# Patient Record
Sex: MALE | Race: WHITE | Employment: FULL TIME | URBAN - METROPOLITAN AREA
[De-identification: names, ages, dates, MRNs, and addresses within clinical notes are randomized per-mention and may not be internally consistent; named-entity substitution may affect disease eponyms.]

---

## 2018-11-26 PROBLEM — D69.6 THROMBOCYTOPENIA (HCC): Status: ACTIVE | Noted: 2018-11-26

## 2018-11-27 PROBLEM — R73.02 IGT (IMPAIRED GLUCOSE TOLERANCE): Status: ACTIVE | Noted: 2018-11-27

## 2018-11-27 PROBLEM — E66.3 OVERWEIGHT (BMI 25.0-29.9): Status: ACTIVE | Noted: 2018-11-27

## 2018-11-29 PROBLEM — J43.9 PULMONARY EMPHYSEMA (HCC): Status: ACTIVE | Noted: 2018-11-29

## 2018-12-02 PROBLEM — D69.6 THROMBOCYTOPENIA (HCC): Status: RESOLVED | Noted: 2018-11-26 | Resolved: 2018-12-02

## 2018-12-02 PROBLEM — Z23 NEED FOR SHINGLES VACCINE: Status: ACTIVE | Noted: 2018-12-02

## 2018-12-03 PROBLEM — J44.1 CHRONIC OBSTRUCTIVE PULMONARY DISEASE WITH ACUTE EXACERBATION (HCC): Status: ACTIVE | Noted: 2018-12-03

## 2019-04-25 PROBLEM — M72.0 DUPUYTREN'S CONTRACTURE OF RIGHT HAND: Status: ACTIVE | Noted: 2019-04-25

## 2019-04-29 PROBLEM — R43.2: Status: ACTIVE | Noted: 2019-04-29

## 2019-06-04 ENCOUNTER — HOSPITAL ENCOUNTER (OUTPATIENT)
Dept: PHYSICAL THERAPY | Age: 59
End: 2019-06-04

## 2019-06-06 RX ORDER — SODIUM CHLORIDE 0.9 % (FLUSH) 0.9 %
5-40 SYRINGE (ML) INJECTION EVERY 8 HOURS
Status: CANCELLED | OUTPATIENT
Start: 2019-06-06

## 2019-06-06 RX ORDER — SODIUM CHLORIDE 0.9 % (FLUSH) 0.9 %
5-40 SYRINGE (ML) INJECTION AS NEEDED
Status: CANCELLED | OUTPATIENT
Start: 2019-06-06

## 2019-06-12 ENCOUNTER — ANESTHESIA EVENT (OUTPATIENT)
Dept: SURGERY | Age: 59
End: 2019-06-12
Payer: COMMERCIAL

## 2019-06-13 ENCOUNTER — HOSPITAL ENCOUNTER (OUTPATIENT)
Age: 59
Setting detail: OUTPATIENT SURGERY
Discharge: HOME | End: 2019-06-13
Attending: ORTHOPAEDIC SURGERY | Admitting: ORTHOPAEDIC SURGERY
Payer: COMMERCIAL

## 2019-06-13 ENCOUNTER — ANESTHESIA (OUTPATIENT)
Dept: SURGERY | Age: 59
End: 2019-06-13
Payer: COMMERCIAL

## 2019-06-13 VITALS
HEART RATE: 65 BPM | WEIGHT: 191 LBS | OXYGEN SATURATION: 94 % | SYSTOLIC BLOOD PRESSURE: 117 MMHG | BODY MASS INDEX: 28.21 KG/M2 | DIASTOLIC BLOOD PRESSURE: 80 MMHG | TEMPERATURE: 98 F | RESPIRATION RATE: 15 BRPM

## 2019-06-13 DIAGNOSIS — M72.0 DUPUYTREN'S CONTRACTURE OF RIGHT HAND: Primary | ICD-10-CM

## 2019-06-13 PROCEDURE — 74011250636 HC RX REV CODE- 250/636: Performed by: ORTHOPAEDIC SURGERY

## 2019-06-13 PROCEDURE — 74011250637 HC RX REV CODE- 250/637: Performed by: ANESTHESIOLOGY

## 2019-06-13 PROCEDURE — 76010000138 HC OR TIME 0.5 TO 1 HR: Performed by: ORTHOPAEDIC SURGERY

## 2019-06-13 PROCEDURE — 74011250636 HC RX REV CODE- 250/636

## 2019-06-13 PROCEDURE — 76210000063 HC OR PH I REC FIRST 0.5 HR: Performed by: ORTHOPAEDIC SURGERY

## 2019-06-13 PROCEDURE — 74011250636 HC RX REV CODE- 250/636: Performed by: ANESTHESIOLOGY

## 2019-06-13 PROCEDURE — 76210000020 HC REC RM PH II FIRST 0.5 HR: Performed by: ORTHOPAEDIC SURGERY

## 2019-06-13 PROCEDURE — 77030002888 HC SUT CHRMC J&J -A: Performed by: ORTHOPAEDIC SURGERY

## 2019-06-13 PROCEDURE — 77030018836 HC SOL IRR NACL ICUM -A: Performed by: ORTHOPAEDIC SURGERY

## 2019-06-13 PROCEDURE — 76060000032 HC ANESTHESIA 0.5 TO 1 HR: Performed by: ORTHOPAEDIC SURGERY

## 2019-06-13 PROCEDURE — 77030008367 HC SPLNT ORTHGLS BSNM -A: Performed by: ORTHOPAEDIC SURGERY

## 2019-06-13 PROCEDURE — 77030039266 HC ADH SKN EXOFIN S2SG -A: Performed by: ORTHOPAEDIC SURGERY

## 2019-06-13 PROCEDURE — 74011000250 HC RX REV CODE- 250: Performed by: ORTHOPAEDIC SURGERY

## 2019-06-13 PROCEDURE — 74011000250 HC RX REV CODE- 250

## 2019-06-13 RX ORDER — SODIUM CHLORIDE 0.9 % (FLUSH) 0.9 %
5-40 SYRINGE (ML) INJECTION AS NEEDED
Status: DISCONTINUED | OUTPATIENT
Start: 2019-06-13 | End: 2019-06-13 | Stop reason: HOSPADM

## 2019-06-13 RX ORDER — HYDROMORPHONE HYDROCHLORIDE 2 MG/ML
0.5 INJECTION, SOLUTION INTRAMUSCULAR; INTRAVENOUS; SUBCUTANEOUS
Status: DISCONTINUED | OUTPATIENT
Start: 2019-06-13 | End: 2019-06-13 | Stop reason: HOSPADM

## 2019-06-13 RX ORDER — CEFAZOLIN SODIUM/WATER 2 G/20 ML
2 SYRINGE (ML) INTRAVENOUS ONCE
Status: COMPLETED | OUTPATIENT
Start: 2019-06-13 | End: 2019-06-13

## 2019-06-13 RX ORDER — MIDAZOLAM HYDROCHLORIDE 1 MG/ML
2 INJECTION, SOLUTION INTRAMUSCULAR; INTRAVENOUS
Status: DISCONTINUED | OUTPATIENT
Start: 2019-06-13 | End: 2019-06-13 | Stop reason: HOSPADM

## 2019-06-13 RX ORDER — LIDOCAINE HYDROCHLORIDE 5 MG/ML
INJECTION, SOLUTION INFILTRATION; INTRAVENOUS
Status: COMPLETED | OUTPATIENT
Start: 2019-06-13 | End: 2019-06-13

## 2019-06-13 RX ORDER — ONDANSETRON 2 MG/ML
4 INJECTION INTRAMUSCULAR; INTRAVENOUS
Status: DISCONTINUED | OUTPATIENT
Start: 2019-06-13 | End: 2019-06-13 | Stop reason: HOSPADM

## 2019-06-13 RX ORDER — ACETAMINOPHEN 500 MG
1000 TABLET ORAL ONCE
Status: COMPLETED | OUTPATIENT
Start: 2019-06-13 | End: 2019-06-13

## 2019-06-13 RX ORDER — SODIUM CHLORIDE, SODIUM LACTATE, POTASSIUM CHLORIDE, CALCIUM CHLORIDE 600; 310; 30; 20 MG/100ML; MG/100ML; MG/100ML; MG/100ML
1000 INJECTION, SOLUTION INTRAVENOUS CONTINUOUS
Status: DISCONTINUED | OUTPATIENT
Start: 2019-06-13 | End: 2019-06-13 | Stop reason: HOSPADM

## 2019-06-13 RX ORDER — HYDROCODONE BITARTRATE AND ACETAMINOPHEN 5; 325 MG/1; MG/1
1 TABLET ORAL
Qty: 15 TAB | Refills: 0 | Status: SHIPPED | OUTPATIENT
Start: 2019-06-13 | End: 2019-06-16

## 2019-06-13 RX ORDER — LIDOCAINE HYDROCHLORIDE 10 MG/ML
0.1 INJECTION INFILTRATION; PERINEURAL AS NEEDED
Status: DISCONTINUED | OUTPATIENT
Start: 2019-06-13 | End: 2019-06-13 | Stop reason: HOSPADM

## 2019-06-13 RX ORDER — LIDOCAINE HYDROCHLORIDE 10 MG/ML
INJECTION INFILTRATION; PERINEURAL AS NEEDED
Status: DISCONTINUED | OUTPATIENT
Start: 2019-06-13 | End: 2019-06-13 | Stop reason: HOSPADM

## 2019-06-13 RX ORDER — PROPOFOL 10 MG/ML
INJECTION, EMULSION INTRAVENOUS AS NEEDED
Status: DISCONTINUED | OUTPATIENT
Start: 2019-06-13 | End: 2019-06-13 | Stop reason: HOSPADM

## 2019-06-13 RX ORDER — ALBUTEROL SULFATE 0.83 MG/ML
2.5 SOLUTION RESPIRATORY (INHALATION) AS NEEDED
Status: DISCONTINUED | OUTPATIENT
Start: 2019-06-13 | End: 2019-06-13 | Stop reason: HOSPADM

## 2019-06-13 RX ORDER — BUPIVACAINE HYDROCHLORIDE 5 MG/ML
INJECTION, SOLUTION EPIDURAL; INTRACAUDAL AS NEEDED
Status: DISCONTINUED | OUTPATIENT
Start: 2019-06-13 | End: 2019-06-13 | Stop reason: HOSPADM

## 2019-06-13 RX ORDER — SODIUM CHLORIDE 0.9 % (FLUSH) 0.9 %
5-40 SYRINGE (ML) INJECTION EVERY 8 HOURS
Status: DISCONTINUED | OUTPATIENT
Start: 2019-06-13 | End: 2019-06-13 | Stop reason: HOSPADM

## 2019-06-13 RX ORDER — PROPOFOL 10 MG/ML
INJECTION, EMULSION INTRAVENOUS
Status: DISCONTINUED | OUTPATIENT
Start: 2019-06-13 | End: 2019-06-13 | Stop reason: HOSPADM

## 2019-06-13 RX ADMIN — ACETAMINOPHEN 1000 MG: 500 TABLET, FILM COATED ORAL at 09:41

## 2019-06-13 RX ADMIN — LIDOCAINE HYDROCHLORIDE 40 ML: 5 INJECTION, SOLUTION INFILTRATION; INTRAVENOUS at 10:15

## 2019-06-13 RX ADMIN — Medication 2 G: at 10:11

## 2019-06-13 RX ADMIN — PROPOFOL 30 MG: 10 INJECTION, EMULSION INTRAVENOUS at 10:17

## 2019-06-13 RX ADMIN — SODIUM CHLORIDE, SODIUM LACTATE, POTASSIUM CHLORIDE, AND CALCIUM CHLORIDE 1000 ML: 600; 310; 30; 20 INJECTION, SOLUTION INTRAVENOUS at 09:30

## 2019-06-13 RX ADMIN — PROPOFOL 100 MCG/KG/MIN: 10 INJECTION, EMULSION INTRAVENOUS at 10:13

## 2019-06-13 NOTE — ANESTHESIA PROCEDURE NOTES
Peripheral Block    Start time: 6/13/2019 10:15 AM  End time: 6/13/2019 10:16 AM  Performed by: Jaciel Goode CRNA  Authorized by: Chris Akers MD       Pre-procedure:    Indications: at surgeon's request and procedure for pain    Preanesthetic Checklist: patient identified, risks and benefits discussed, site marked, timeout performed, anesthesia consent given and patient being monitored    Timeout Time: 10:14          Block Type:   Block Type:  Carmen block  Patient Position:  Supine  Block Limb IV Checked: Yes    Esmarch exsanguination: Yes    Tourniquet Type:  Single  Tourniquet Location:  Above elbow  Tourniquet Inflated:  6/13/2019 10:15 AM  Inflation (mmHg):  250  Infused Agent:  Lidocaine 0.5%  Volume Infused (mL):  40    Assessment:    Injection Assessment:   Patient tolerance:  Patient tolerated the procedure well with no immediate complications

## 2019-06-13 NOTE — H&P
Outpatient Surgery History and Physical:  Leena Regan was seen and examined. CHIEF COMPLAINT:   Finger contractor of right ring finger     PE:   There were no vitals taken for this visit. Heart:   Regular rhythm      Lungs:  Are clear      Past Medical History:    Patient Active Problem List    Diagnosis    Distorted taste    Dupuytren's contracture of right hand    Need for shingles vaccine    Pulmonary emphysema ([address]    IGT (impaired glucose tolerance)    Overweight (BMI 25.0-29. 9)    History of pneumonia    Tobacco abuse    Hemochromatosis       Surgical History:   Past Surgical History:   Procedure Laterality Date    HX HERNIA REPAIR  1967    inguinal       Social History: Patient  reports that he has been smoking. He has a 60.00 pack-year smoking history. He has never used smokeless tobacco. He reports that he drank about 8.4 oz of alcohol per week. He reports that he does not use drugs. Family History:   Family History   Problem Relation Age of Onset   24 [facility] Cancer Mother     No Known Problems Father        Allergies: Reviewed per EMR  Allergies   Allergen Reactions    Shellfish Derived Other (comments)     Dizzy        Medications:    No current facility-administered medications on file prior to encounter. Current Outpatient Medications on File Prior to Encounter   Medication Sig    albuterol (PROVENTIL VENTOLIN) 2.5 mg /3 mL (0.083 %) nebulizer solution 3 mL by Nebulization route every four (4) hours as needed for Wheezing.  albuterol (PROVENTIL HFA, VENTOLIN HFA, PROAIR HFA) 90 mcg/actuation inhaler Take 2 Puffs by inhalation every four (4) hours as needed for Wheezing or Shortness of Breath. The surgery is planned for the MINI OPEN RELEASE OF THE RIGHT RING FINGER        History and physical has been reviewed. The patient has been examined.  There have been no significant clinical changes since the completion of the originally dated History and Physical.      The patient is here today for outpatient surgery. I have examined the patient, no changes are noted in the patient's medical status. Necessity for the procedure/care is still present and the history and physical above is current. See the office notes for the full long term history of the problem. Please see the recent office notes for the musculoskeletal examination.     Signed By: Sterling Henry MD     June 13, 2019 9:10 AM

## 2019-06-13 NOTE — PERIOP NOTES
PACU DISCHARGE NOTE  Vital signs stable, pain well controlled, alert and oriented times three or at baseline, no anesthetic complications. IV removed with catheter tip intact. Written and verbal discharge instructions given, including pain control, dressing care and follow up appointment. Spouse, Gillian Martin, verbalized understanding and signed discharge instructions electronically. All questions answered prior to discharge. Dr Michele Martínez okay to discharge at this time. Pt and all belongings taken via wheelchair and safely put in vehicle.

## 2019-06-13 NOTE — DISCHARGE INSTRUCTIONS
Keep dressing clean, dry and intact until post op day number 3-4. Then may shower, pat dry and keep covered until follow up. Do not scrub incision or submerge under water. Move fingers, elevate, and ice to prevent swelling. No heavy lifting. TYPICAL SIDE EFFECTS OF PAIN MEDICATION:  *    Constipation: Drink lots of fluids. Over the counter stool softener if needed. *    Nausea: Take pain medication with food. Call your doctor with persistent nausea. ACTIVITY  · As tolerated and as directed by your doctor. · Bathe or shower as directed by your doctor. DIET  · Day of surgery: Clear liquids until no nausea or vomiting; small portion, light diet Robson foods (ex: baked chicken, plain rice, grits, scrambled eggs, toast). Nothing greasy, fried or spicy today. · Advance to regular diet on second day, unless your doctor orders otherwise. · If nausea and vomiting continues, call your doctor. PAIN  · Take pain medication as directed by your doctor. · DO NOT take aspirin or blood thinners unless directed by your doctor. CALL YOUR DOCTOR IF    s Call your doctor if pain is NOT relieved by medication.   s Excessive bleeding that does not stop after holding pressure over the area  · Temperature of 101 degrees F or above  · Excessive redness, swelling or bruising, and/ or green or yellow, smelly discharge from incision    AFTER ANESTHESIA   · For the first 24 hours: DO NOT Drive, Drink alcoholic beverages, or Make important decisions. · Be aware of dizziness following anesthesia and while taking pain medication.        DISCHARGE SUMMARY from Nurse    PATIENT INSTRUCTIONS:    After general anesthesia or intravenous sedation, for 24 hours or while taking prescription Narcotics:  · Limit your activities  · Do not drive and operate hazardous machinery  · Do not make important personal or business decisions  · Do  not drink alcoholic beverages  · If you have not urinated within 8 hours after discharge, please contact your surgeon on call. *  Please give a list of your current medications to your Primary Care Provider. *  Please update this list whenever your medications are discontinued, doses are      changed, or new medications (including over-the-counter products) are added. *  Please carry medication information at all times in case of emergency situations. Preventing Infection at Home  We care about preventing infection and avoiding the spread of germs - not only when you are in the hospital but also when you return home. When you return home from the hospital, its important to take the following steps to help prevent infection and avoid spreading germs that could infect you and others. Ask everyone in your home to follow these guidelines, too. Clean Your Hands  · Clean your hands whenever your hands are visibly dirty, before you eat, before or after touching your mouth, nose or eyes, and before preparing food. Clean them after contact with body fluids, using the restroom, touching animals or changing diapers. · When washing hands, wet them with warm water and work up a lather. Rub hands for at least 15 seconds, then rinse them and pat them dry with a clean towel or paper towel. · When using hand sanitizers, it should take about 15 seconds to rub your hands dry. If not, you probably didnt apply enough . Cover Your Sneeze or Cough  Germs are released into the air whenever you sneeze or cough. To prevent the spread of infection:  · Turn away from other people before coughing or sneezing. · Cover your mouth or nose with a tissue when you cough or sneeze. Put the tissue in the trash. · If you dont have a tissue, cough or sneeze into your upper sleeve, not your hands. · Always clean your hands after coughing or sneezing. Care for Wounds  Your skin is your bodys first line of defense against germs, but an open wound leaves an easy way for germs to enter your body. To prevent infection:  · Clean your hands before and after changing wound dressings, and wear gloves to change dressings if recommended by your doctor. · Take special care with IV lines or other devices inserted into the body. If you must touch them, clean your hands first.  · Follow any specific instructions from your doctor to care for your wounds. Contact your doctor if you experience any signs of infection, such as fever or increased redness at the surgical or wound site. Keep a Clean Home  · Clean or wipe commonly touched hard surfaces like door handles, sinks, tabletops, phones and TV remotes. · Use products labeled disinfectant to kill harmful bacteria and viruses. · Use a clean cloth or paper towel to clean and dry surfaces. Wiping surfaces with a dirty dishcloth, sponge or towel will only spread germs. · Never share toothbrushes, gómez, drinking glasses, utensils, razor blades, face cloths or bath towels to avoid spreading germs. · Be sure that the linens that you sleep on are clean. · Keep pets away from wounds and wash your hands after touching pets, their toys or bedding. We care about you and your health. Remember, preventing infections is a team effort between you, your family, friends and health care providers. These are general instructions for a healthy lifestyle:    No smoking/ No tobacco products/ Avoid exposure to second hand smoke    Surgeon General's Warning:  Quitting smoking now greatly reduces serious risk to your health.     Obesity, smoking, and sedentary lifestyle greatly increases your risk for illness    A healthy diet, regular physical exercise & weight monitoring are important for maintaining a healthy lifestyle    You may be retaining fluid if you have a history of heart failure or if you experience any of the following symptoms:  Weight gain of 3 pounds or more overnight or 5 pounds in a week, increased swelling in our hands or feet or shortness of breath while lying flat in bed. Please call your doctor as soon as you notice any of these symptoms; do not wait until your next office visit. Recognize signs and symptoms of STROKE:    F-face looks uneven    A-arms unable to move or move unevenly    S-speech slurred or non-existent    T-time-call 911 as soon as signs and symptoms begin-DO NOT go       Back to bed or wait to see if you get better-TIME IS BRAIN.

## 2019-06-13 NOTE — ANESTHESIA PREPROCEDURE EVALUATION
Relevant Problems   No relevant active problems       Anesthetic History   No history of anesthetic complications            Review of Systems / Medical History  Patient summary reviewed and pertinent labs reviewed    Pulmonary    COPD: moderate      Shortness of breath (with exertion) and smoker         Neuro/Psych              Cardiovascular                Pertinent negatives: No angina  Exercise tolerance: <4 METS     GI/Hepatic/Renal  Within defined limits              Endo/Other        Obesity     Other Findings   Comments: hemochromatosis           Physical Exam    Airway  Mallampati: II  TM Distance: 4 - 6 cm  Neck ROM: normal range of motion   Mouth opening: Normal     Cardiovascular    Rhythm: regular  Rate: normal      Pertinent negatives: No murmur   Dental    Dentition: Poor dentition     Pulmonary    Rhonchi:bilateral             Abdominal         Other Findings            Anesthetic Plan    ASA: 3  Anesthesia type: total IV anesthesia          Induction: Intravenous  Anesthetic plan and risks discussed with: Patient

## 2019-06-13 NOTE — ANESTHESIA POSTPROCEDURE EVALUATION
Procedure(s): MINI OPEN RELEASE OF THE RIGHT RING FINGER.    total IV anesthesia    Anesthesia Post Evaluation      Multimodal analgesia: multimodal analgesia used between 6 hours prior to anesthesia start to PACU discharge  Patient location during evaluation: bedside  Patient participation: complete - patient participated  Level of consciousness: awake and responsive to light touch  Pain management: adequate  Airway patency: patent  Anesthetic complications: no  Cardiovascular status: acceptable, hemodynamically stable, blood pressure returned to baseline and stable  Respiratory status: acceptable, unassisted, spontaneous ventilation and nonlabored ventilation  Hydration status: acceptable  Post anesthesia nausea and vomiting:  controlled      Vitals Value Taken Time   /65 6/13/2019 11:04 AM   Temp 36.7 °C (98.1 °F) 6/13/2019 10:55 AM   Pulse 63 6/13/2019 11:04 AM   Resp 18 6/13/2019 10:55 AM   SpO2 92 % 6/13/2019 11:04 AM   Vitals shown include unvalidated device data.

## 2019-06-13 NOTE — BRIEF OP NOTE
BRIEF OPERATIVE NOTE    Date of Procedure: 6/13/2019   Preoperative Diagnosis: Dupuytren's contracture [M72.0]  Postoperative Diagnosis: Dupuytren's contracture [M72.0]    Procedure(s):   MINI OPEN RELEASE OF THE RIGHT RING FINGER  Surgeon(s) and Role:     * Marek Hernandez MD - Primary         Surgical Assistant: JOE    Surgical Staff:  Circ-1: Steve Ku RN  Scrub Tech-1: Tung Samuels  Event Time In Time Out   Incision Start 1022    Incision Close 1045      Anesthesia: Carmen   Estimated Blood Loss: MINIMAL  Specimens: * No specimens in log *   Findings: SEE DICTATION   Complications: NONE  Implants: * No implants in log *

## 2019-06-19 NOTE — OP NOTES
Operative Report       DOS:  6/13/19  Preoperative diagnosis:  Dupuytren's contracture [M72.0]    Postoperative diagnosis: Dupuytren's contracture [M72.0]    Surgeon(s) and Role:     * Aditya Jacob MD - Primary     Anesthesia: Carmen Local with MAC. Procedures: Procedure(s): MINI OPEN RELEASE OF THE RIGHT RING FINGER DUPUYTREN'S CONTRACTURE.    EBL/IV FLUIDS: Per Anesthesia. COMPLICATIONS: None. DISPOSITION: Stable to recovery room. INDICATIONS FOR PROCEDURE: The patient is a pleasant 50-year-old male with history of right ring dupuytren's contracture that has failed nonoperative measures. After both operative and nonoperative treatment options were discussed, the decision was made to go ahead with a right ring mini open release. Risks and benefits of the procedure were discussed including, but not limited to bleeding, infection, injury to adjacent structures consisting of tendon, artery, and nerve, need for additional procedures, wound dehiscence, scar formation, incomplete resolution of symptoms, recurrence of symptoms, and transient neuropraxia. Informed consent was obtained. PROCEDURE IN DETAIL: The patient was seen and marked in the preoperative suite. The patient was taken back to the OR, placed on the table in supine position with right upper extremities on hand tables. Right upper extremities was prepped and draped in standard sterile fashion. A formal timeout was performed confirming patient identification, preoperative antibiotics, and planned operative procedure. Patient underwent a Paulina block prior to the procedure. A longitudinal incision was made over the pretendinous cord in the palm affecting the right ring finger. We circumferentially dissected around the cord. Protected the neurovascular bundles under direct vision excise a segment of the cord and were able to get the MP joint into full extension. The patient was not tolerating. Block well.   He had a PIP contracture that we discussed Xiaflex injection so we elected to proceed with closure. We irrigated This with normal saline and closed with absorbable sutures and Dermabond glue. We injected some lidocaine in the procedure. Soft sterile dressing was placed tourniquet was placed down fingers pinked up nicely and the patient was taken to the recovery room having tolerated procedure well. POSTOPERATIVE CARE: Early motion. No heavy lifting. Followup in 2 weeks for suture removal.    Plan well begin him fitted for a splint within the next week to 10 days.     Closure: Primary    Complications: None     Signed By: Britney Lord MD

## 2019-09-23 PROBLEM — Z23 NEED FOR SHINGLES VACCINE: Status: RESOLVED | Noted: 2018-12-02 | Resolved: 2019-09-23

## 2020-02-05 PROBLEM — E66.9 OBESITY (BMI 30.0-34.9): Status: ACTIVE | Noted: 2018-11-27

## 2020-02-11 PROBLEM — Z13.1 SCREENING FOR DIABETES MELLITUS: Status: ACTIVE | Noted: 2020-02-11

## 2020-02-11 PROBLEM — Z12.5 SCREENING PSA (PROSTATE SPECIFIC ANTIGEN): Status: ACTIVE | Noted: 2020-02-11

## 2020-02-11 PROBLEM — J98.01 COUGH DUE TO BRONCHOSPASM: Status: ACTIVE | Noted: 2020-02-11

## 2020-02-11 PROBLEM — J42 CHRONIC BRONCHITIS (HCC): Status: ACTIVE | Noted: 2020-02-11

## 2020-02-11 PROBLEM — E78.5 DYSLIPIDEMIA: Status: ACTIVE | Noted: 2020-02-11

## 2020-02-11 PROBLEM — Z82.49 FAMILY HISTORY OF PREMATURE CAD: Status: ACTIVE | Noted: 2020-02-11

## 2020-03-12 PROBLEM — Z13.1 SCREENING FOR DIABETES MELLITUS: Status: RESOLVED | Noted: 2020-02-11 | Resolved: 2020-03-12

## 2020-12-29 PROBLEM — Z28.21 REFUSED INFLUENZA VACCINE: Status: ACTIVE | Noted: 2020-12-29

## 2020-12-29 PROBLEM — Z53.20 COLONOSCOPY REFUSED: Status: ACTIVE | Noted: 2020-12-29

## 2022-02-13 PROBLEM — Z12.5 SCREENING PSA (PROSTATE SPECIFIC ANTIGEN): Chronic | Status: ACTIVE | Noted: 2020-02-11

## 2022-02-13 PROBLEM — F17.210 SMOKING GREATER THAN 30 PACK YEARS: Status: ACTIVE | Noted: 2022-02-13

## 2022-02-13 PROBLEM — Z00.00 WELL ADULT EXAM: Status: ACTIVE | Noted: 2022-02-13

## 2022-02-13 PROBLEM — Z00.00 WELL ADULT EXAM: Chronic | Status: ACTIVE | Noted: 2022-02-13

## 2022-02-13 PROBLEM — Z91.89 CANDIDATE FOR STATIN THERAPY DUE TO RISK OF FUTURE CARDIOVASCULAR EVENT: Status: ACTIVE | Noted: 2022-02-13

## 2022-02-13 PROBLEM — J98.01 COUGH DUE TO BRONCHOSPASM: Status: RESOLVED | Noted: 2020-02-11 | Resolved: 2022-02-13

## 2022-02-13 PROBLEM — Z91.89 10 YEAR RISK OF MI OR STROKE 7.5% OR GREATER: Status: ACTIVE | Noted: 2022-02-13

## 2022-02-13 PROBLEM — Z13.1 SCREENING FOR DIABETES MELLITUS: Chronic | Status: ACTIVE | Noted: 2020-02-11

## 2022-02-13 PROBLEM — Z92.29 COVID-19 VACCINE SERIES COMPLETED: Status: ACTIVE | Noted: 2022-02-13

## 2022-02-13 PROBLEM — R03.0 ELEVATED BLOOD PRESSURE READING: Status: ACTIVE | Noted: 2022-02-13

## 2022-03-09 ENCOUNTER — HOSPITAL ENCOUNTER (OUTPATIENT)
Dept: CT IMAGING | Age: 62
Discharge: HOME | End: 2022-03-09
Attending: FAMILY MEDICINE
Payer: SELF-PAY

## 2022-03-09 DIAGNOSIS — Z72.0 TOBACCO ABUSE: ICD-10-CM

## 2022-03-09 DIAGNOSIS — Z91.89 CANDIDATE FOR STATIN THERAPY DUE TO RISK OF FUTURE CARDIOVASCULAR EVENT: ICD-10-CM

## 2022-03-09 DIAGNOSIS — E78.5 DYSLIPIDEMIA: ICD-10-CM

## 2022-03-09 DIAGNOSIS — Z91.89 10 YEAR RISK OF MI OR STROKE 7.5% OR GREATER: ICD-10-CM

## 2022-03-09 DIAGNOSIS — R03.0 ELEVATED BLOOD PRESSURE READING: ICD-10-CM

## 2022-03-09 DIAGNOSIS — F17.210 SMOKING GREATER THAN 30 PACK YEARS: ICD-10-CM

## 2022-03-09 DIAGNOSIS — R73.02 IGT (IMPAIRED GLUCOSE TOLERANCE): ICD-10-CM

## 2022-03-09 DIAGNOSIS — E66.3 OVERWEIGHT (BMI 25.0-29.9): ICD-10-CM

## 2022-03-09 DIAGNOSIS — Z82.49 FAMILY HISTORY OF PREMATURE CAD: ICD-10-CM

## 2022-03-09 PROCEDURE — 75571 CT HRT W/O DYE W/CA TEST: CPT

## 2022-03-18 PROBLEM — Z12.5 SCREENING PSA (PROSTATE SPECIFIC ANTIGEN): Status: ACTIVE | Noted: 2020-02-11

## 2022-03-18 PROBLEM — F17.210 SMOKING GREATER THAN 30 PACK YEARS: Status: ACTIVE | Noted: 2022-02-13

## 2022-03-18 PROBLEM — J42 CHRONIC BRONCHITIS (HCC): Status: ACTIVE | Noted: 2020-02-11

## 2022-03-18 PROBLEM — E66.3 OVERWEIGHT (BMI 25.0-29.9): Status: ACTIVE | Noted: 2018-11-27

## 2022-03-18 PROBLEM — Z53.20 COLONOSCOPY REFUSED: Status: ACTIVE | Noted: 2020-12-29

## 2022-03-18 PROBLEM — Z00.00 WELL ADULT EXAM: Status: ACTIVE | Noted: 2022-02-13

## 2022-03-19 PROBLEM — M72.0 DUPUYTREN'S CONTRACTURE OF RIGHT HAND: Status: ACTIVE | Noted: 2019-04-25

## 2022-03-19 PROBLEM — R03.0 ELEVATED BLOOD PRESSURE READING: Status: ACTIVE | Noted: 2022-02-13

## 2022-03-19 PROBLEM — R73.02 IGT (IMPAIRED GLUCOSE TOLERANCE): Status: ACTIVE | Noted: 2018-11-27

## 2022-03-19 PROBLEM — Z92.29 COVID-19 VACCINE SERIES COMPLETED: Status: ACTIVE | Noted: 2022-02-13

## 2022-03-19 PROBLEM — Z91.89 CANDIDATE FOR STATIN THERAPY DUE TO RISK OF FUTURE CARDIOVASCULAR EVENT: Status: ACTIVE | Noted: 2022-02-13

## 2022-03-19 PROBLEM — J43.9 PULMONARY EMPHYSEMA (HCC): Status: ACTIVE | Noted: 2018-11-29

## 2022-03-19 PROBLEM — R43.2: Status: ACTIVE | Noted: 2019-04-29

## 2022-03-19 PROBLEM — Z13.1 SCREENING FOR DIABETES MELLITUS: Status: ACTIVE | Noted: 2020-02-11

## 2022-03-19 PROBLEM — Z91.89 10 YEAR RISK OF MI OR STROKE 7.5% OR GREATER: Status: ACTIVE | Noted: 2022-02-13

## 2022-03-19 PROBLEM — Z28.21 REFUSED INFLUENZA VACCINE: Status: ACTIVE | Noted: 2020-12-29

## 2022-03-20 PROBLEM — E78.5 DYSLIPIDEMIA: Status: ACTIVE | Noted: 2020-02-11

## 2022-03-20 PROBLEM — Z82.49 FAMILY HISTORY OF PREMATURE CAD: Status: ACTIVE | Noted: 2020-02-11

## 2022-04-11 PROBLEM — R94.30 ABNORMAL CARDIOVASCULAR FUNCTION STUDY: Status: ACTIVE | Noted: 2022-04-11

## 2022-06-30 ENCOUNTER — OFFICE VISIT (OUTPATIENT)
Dept: PULMONOLOGY | Age: 62
End: 2022-06-30
Payer: COMMERCIAL

## 2022-06-30 VITALS
SYSTOLIC BLOOD PRESSURE: 120 MMHG | TEMPERATURE: 98 F | HEART RATE: 66 BPM | OXYGEN SATURATION: 96 % | RESPIRATION RATE: 14 BRPM | HEIGHT: 69 IN | WEIGHT: 176 LBS | BODY MASS INDEX: 26.07 KG/M2 | DIASTOLIC BLOOD PRESSURE: 76 MMHG

## 2022-06-30 DIAGNOSIS — J44.9 CHRONIC OBSTRUCTIVE PULMONARY DISEASE, UNSPECIFIED COPD TYPE (HCC): Primary | ICD-10-CM

## 2022-06-30 DIAGNOSIS — F17.210 NICOTINE DEPENDENCE, CIGARETTES, UNCOMPLICATED: ICD-10-CM

## 2022-06-30 LAB
EXPIRATORY TIME: NORMAL
FEF 25-75% %PRED-PRE: NORMAL
FEF 25-75% PRED: NORMAL
FEF 25-75%-PRE: NORMAL
FEV1 %PRED-PRE: 47 %
FEV1 PRED: NORMAL
FEV1/FVC %PRED-PRE: NORMAL
FEV1/FVC PRED: NORMAL
FEV1/FVC: 48 %
FEV1: 1.65 L
FVC %PRED-PRE: 75 %
FVC PRED: NORMAL
FVC: 3.46 L
PEF %PRED-PRE: NORMAL
PEF PRED: NORMAL
PEF-PRE: NORMAL

## 2022-06-30 PROCEDURE — 94010 BREATHING CAPACITY TEST: CPT | Performed by: INTERNAL MEDICINE

## 2022-06-30 PROCEDURE — 99406 BEHAV CHNG SMOKING 3-10 MIN: CPT | Performed by: INTERNAL MEDICINE

## 2022-06-30 PROCEDURE — 99204 OFFICE O/P NEW MOD 45 MIN: CPT | Performed by: INTERNAL MEDICINE

## 2022-06-30 RX ORDER — FLUTICASONE FUROATE, UMECLIDINIUM BROMIDE AND VILANTEROL TRIFENATATE 100; 62.5; 25 UG/1; UG/1; UG/1
1 POWDER RESPIRATORY (INHALATION) DAILY
Qty: 1 EACH | Refills: 11 | Status: SHIPPED | OUTPATIENT
Start: 2022-06-30

## 2022-06-30 RX ORDER — ASPIRIN 81 MG/1
81 TABLET ORAL DAILY
COMMUNITY

## 2022-06-30 RX ORDER — ATORVASTATIN CALCIUM 20 MG/1
20 TABLET, FILM COATED ORAL DAILY
COMMUNITY
End: 2022-07-27 | Stop reason: SDUPTHER

## 2022-06-30 ASSESSMENT — PULMONARY FUNCTION TESTS
FEV1_PERCENT_PREDICTED_PRE: 47
FVC_PERCENT_PREDICTED_PRE: 75
FVC: 3.46
FEV1: 1.65
FEV1/FVC: 48

## 2022-06-30 ASSESSMENT — ENCOUNTER SYMPTOMS: SHORTNESS OF BREATH: 1

## 2022-06-30 NOTE — PROGRESS NOTES
[facility]: Patient Office Visit Note  [address] [place]. [address], [address] [facility]  [phone]    Patient Name:  Amanda Cid  YOB: 1960            Date of Service:  6/30/2022    Chief Complaint   Patient presents with    COPD       History of Present Illness: This is a 80-year-old white male with a history of COPD recently evaluated by Dr. Aura Sandifer with a CT calcium score of 465. Patient had a stress test that was negative but the patient could not reach his target heart rate. He works at [employer] and notes that whenever he tries to  something heavy he gets short of breath. He also notes that he gets short of breath walking up steps and can go up maybe 1-1/2 flights before he would have to stop and rest.  He has been smoking a pack and half cigarettes per day and has approximately 60-pack-year history. He has noted problems with shortness of breath about 6 months. He denies cough or wheezing. He does have a Ventolin inhaler for as needed use which he rarely uses. He has been using Breo with some benefit. Past Medical History:   Diagnosis Date    Allergic rhinitis     Chronic obstructive pulmonary disease (HCC)     inhaler prn, smoker    Cough due to bronchospasm 2/11/2020    chest discomfort (ribs) with cough    Pneumonia     twice. Last episode was 2016       Patient Active Problem List   Diagnosis    Overweight (BMI 25.0-29. 9)    Colonoscopy refused    Well adult exam    Screening PSA (prostate specific antigen)    Chronic bronchitis (HCC)    Smoking greater than 30 pack years    Hemochromatosis, hereditary ([facility] [address]    Distorted taste    Dupuytren's contracture of right hand    10 year risk of MI or stroke 7.5% or greater    Candidate for statin therapy due to risk of future cardiovascular event    Elevated blood pressure reading    COVID-19 vaccine series completed    Refused influenza vaccine    Tobacco abuse  Pulmonary emphysema (HCC)    IGT (impaired glucose tolerance)    Screening for diabetes mellitus    History of pneumonia    Family history of premature CAD    Dyslipidemia    Abnormal cardiovascular function study       Past Surgical History:   Procedure Laterality Date    HERNIA REPAIR  1967    inguinal       Social History     Socioeconomic History    Marital status:      Spouse name: Not on file    Number of children: Not on file    Years of education: Not on file    Highest education level: Not on file   Occupational History    Not on file   Tobacco Use    Smoking status: Current Every Day Smoker     Packs/day: 1.50     Years: 40.00     Pack years: 60.00    Smokeless tobacco: Never Used    Tobacco comment: Quit smoking: trying   Substance and Sexual Activity    Alcohol use: Not Currently    Drug use: No    Sexual activity: Not on file   Other Topics Concern    Not on file   Social History Narrative    Not on file     Social Determinants of Health     Financial Resource Strain:     Difficulty of Paying Living Expenses: Not on file   Food Insecurity:     Worried About [address] in the Last Year: Not on file    Hector of Food in the Last Year: Not on file   Transportation Needs:     Lack of Transportation (Medical): Not on file    Lack of Transportation (Non-Medical):  Not on file   Physical Activity:     Days of Exercise per Week: Not on file    Minutes of Exercise per Session: Not on file   Stress:     Feeling of Stress : Not on file   Social Connections:     Frequency of Communication with Friends and Family: Not on file    Frequency of Social Gatherings with Friends and Family: Not on file    Attends [employer] Services: Not on file    Active Member of Clubs or Organizations: Not on file    Attends Club or Organization Meetings: Not on file    Marital Status: Not on file   Intimate Partner Violence:     Fear of Current or Ex-Partner: Not on file   [facility] Emotionally Abused: Not on file    Physically Abused: Not on file    Sexually Abused: Not on file   Housing Stability:     Unable to Pay for Housing in the Last Year: Not on file    Number of Places Lived in the Last Year: Not on file    Unstable Housing in the Last Year: Not on file       Family History   Problem Relation Age of Onset    No Known Problems Father     Cancer Mother        Allergies   Allergen Reactions    Shellfish-Derived Products Other (See Comments)     Dizzy            Review of Systems   Respiratory: Positive for shortness of breath. Skin: Positive for rash. OBJECTIVE:  Physical Exam:  Vitals:    06/30/22 0904   BP: 120/76   Pulse: 66   Resp: 14   Temp: 98 °F (36.7 °C)   SpO2: 96%        GENERAL APPEARANCE:   The patient is normal weight and in no respiratory distress. HEENT:   PERRL. Conjunctivae unremarkable. Nasal mucosa is without epistaxis, exudate, or polyps. Gums and dentition are unremarkable. There is no oropharyngeal narrowing. TMs are clear. NECK/LYMPHATIC:   Symmetrical with no elevation of jugular venous pulsation. Trachea midline. No thyroid enlargement. No cervical adenopathy. LUNGS:   Normal respiratory effort with symmetrical lung expansion. Breath sounds decreased with no wheezing. HEART:   There is a regular rate and rhythm. No murmur, rub, or gallop. There is no edema in the lower extremities. ABDOMEN:   Soft and non-tender. No hepatosplenomegaly. Bowel sounds are normal.       SKIN:   There are no rashes, cyanosis, jaundice, or ecchymosis present. EXTREMITIES:   The extremities are unremarkable without clubbing, cyanosis, joint inflammation, degenerative, or ischemic change. MUSCULOSKELETAL:   There is no abnormal tone, muscle atrophy, or abnormal movement present. NEURO:   The patient is alert and oriented to person, place, and time.   Memory appears intact and mood is normal.  No gross sensorimotor deficits are present. DIAGNOSTIC TESTS:   CT of chest:         CXR:    Spirometry: 6/30/2022 severe obstruction      Office Spirometry Results Latest Ref Rng & Units 6/30/2022   FVC L 3.46   FEV1 L 1.65   FEV1 %PRED-PRE % 47   FVC %PRED-PRE % 75   FEV1/FVC % 48        Exercise oximetry:      PCXR: No valid procedures specified. CXR PA and lateral:  No results found for this or any previous visit. PET/CT: No valid procedures specified. CT of chest w contrast:  No valid procedures specified. Screening chest CT: No results found for this or any previous visit. CT of chest w/out contrast:   No results found for this or any previous visit. ASSESSMENT:   Diagnosis Orders   1. Chronic obstructive pulmonary disease, unspecified COPD type ([address]  Spirometry Without Bronchodilator   Gold stage 3 Spirometry Without Bronchodilator   2. Nicotine dependence, cigarettes, uncomplicated         PLAN:  · Total smoking cessation is advised. Patient's tobacco use confirmed as documented in the medical record. Discussed various smoking cessation products including pills, patches, inhaler, gum, weaning self off, \"cold turkey\", and smoking cessation classes. Discussed also with patient disease risk of ongoing smoking including CVA, lung cancer, and heart disease. At this point, patient's commitment to quitting is fair. 7 minutes were spent counseling patient regarding tobacco cessation. · Complete PFTs  · Screening for alpha-1 antitrypsin deficiency  · Low-dose screening CT 1 year from March 2022  · Follow-up in 4 months    Orders Placed This Encounter   Procedures    Spirometry Without Bronchodilator     Standing Status:   Future     Number of Occurrences:   1     Standing Expiration Date:   6/30/2023       No orders of the defined types were placed in this encounter.         Electronically signed by  Vivi Andrews MD

## 2022-06-30 NOTE — PROGRESS NOTES
[facility]: Patient Office Visit Note  [address] [place]. [address] [facility], [address] [facility]  [phone]    Patient Name:  David Dean  YOB: 1960            Date of Service:  6/30/2022    No chief complaint on file. History of Present Illness:  ***    Past Medical History:   Diagnosis Date    Allergic rhinitis     Chronic obstructive pulmonary disease (HCC)     inhaler prn, smoker    Cough due to bronchospasm 2/11/2020    chest discomfort (ribs) with cough    Pneumonia     twice. Last episode was 2016       Patient Active Problem List   Diagnosis    Overweight (BMI 25.0-29. 9)    Colonoscopy refused    Well adult exam    Screening PSA (prostate specific antigen)    Chronic bronchitis (HCC)    Smoking greater than 30 pack years    Hemochromatosis, hereditary ([address]    Distorted taste    Dupuytren's contracture of right hand    10 year risk of MI or stroke 7.5% or greater    Candidate for statin therapy due to risk of future cardiovascular event    Elevated blood pressure reading    COVID-19 vaccine series completed    Refused influenza vaccine    Tobacco abuse    Pulmonary emphysema ([address]    IGT (impaired glucose tolerance)    Screening for diabetes mellitus    History of pneumonia    Family history of premature CAD    Dyslipidemia    Abnormal cardiovascular function study       Past Surgical History:   Procedure Laterality Date    HERNIA REPAIR  1967    inguinal       Social History     Socioeconomic History    Marital status:      Spouse name: Not on file    Number of children: Not on file    Years of education: Not on file    Highest education level: Not on file   Occupational History    Not on file   Tobacco Use    Smoking status: Current Every Day Smoker     Packs/day: 1.00    Smokeless tobacco: Never Used    Tobacco comment: Quit smoking: trying   Substance and Sexual Activity    Alcohol use: Not Currently    Drug use: No    Sexual activity: Not on file   Other Topics Concern    Not on file   Social History Narrative    Not on file     Social Determinants of Health     Financial Resource Strain:     Difficulty of Paying Living Expenses: Not on file   Food Insecurity:     Worried About Running Out of Food in the Last Year: Not on file    Hector of Food in the Last Year: Not on file   Transportation Needs:     Lack of Transportation (Medical): Not on file    Lack of Transportation (Non-Medical): Not on file   Physical Activity:     Days of Exercise per Week: Not on file    Minutes of Exercise per Session: Not on file   Stress:     Feeling of Stress : Not on file   Social Connections:     Frequency of Communication with Friends and Family: Not on file    Frequency of Social Gatherings with Friends and Family: Not on file    Attends [employer] Services: Not on file    Active Member of [address] [place] [address] [employer] or Organizations: Not on file    Attends Club or Organization Meetings: Not on file    Marital Status: Not on file   Intimate Partner Violence:     Fear of Current or Ex-Partner: Not on file    Emotionally Abused: Not on file    Physically Abused: Not on file    Sexually Abused: Not on file   Housing Stability:     Unable to Pay for Housing in the Last Year: Not on file    Number of [place] in the Last Year: Not on file    Unstable Housing in the Last Year: Not on file       Family History   Problem Relation Age of Onset    No Known Problems Father     Cancer Mother        Not on File        REVIEW OF SYSTEMS:    CONSTITUTIONAL:   There is no history of fever, chills, night sweats, weight loss, weight gain,   persistent fatigue, or lethargy/hypersomnolence. EYES:   Denies problems with eye pain, erythema, blurred vision, or visual field loss. ENTM:   Denies history of tinnitus, epistaxis, sore throat, hoarseness, or dysphonia. LYMPH:   Denies swollen glands.      CARDIAC:   No chest pain, pressure, discomfort, palpitations, orthopnea, murmurs, or edema. GI:   No dysphagia, heartburn reflux, nausea/vomiting, diarrhea, abdominal pain, or bleeding. :   Denies history of dysuria, hematuria, polyuria, or decreased urine output. MS:   No history of myalgias, arthralgias, bone pain, or muscle cramps. SKIN:   No history of rashes, jaundice, cyanosis, nodules, or ulcers. ENDO:   Negative for heat or cold intolerance. No history of DM. PSYCH:   Negative for anxiety, depression, insomnia, hallucinations. NEURO:   There is no history of AMS, persistent headache, decreased level of consciousness, seizures, or motor or sensory deficits. OBJECTIVE:  Physical Exam:  There were no vitals filed for this visit. GENERAL APPEARANCE:   The patient is normal weight and in no respiratory distress. HEENT:   PERRL. Conjunctivae unremarkable. Nasal mucosa is without epistaxis, exudate, or polyps. Gums and dentition are unremarkable. There is no oropharyngeal narrowing. TMs are clear. NECK/LYMPHATIC:   Symmetrical with no elevation of jugular venous pulsation. Trachea midline. No thyroid enlargement. No cervical adenopathy. LUNGS:   Normal respiratory effort with symmetrical lung expansion. Breath sounds ***. HEART:   There is a regular rate and rhythm. No murmur, rub, or gallop. There is no edema in the lower extremities. ABDOMEN:   Soft and non-tender. No hepatosplenomegaly. Bowel sounds are normal.       SKIN:   There are no rashes, cyanosis, jaundice, or ecchymosis present. EXTREMITIES:   The extremities are unremarkable without clubbing, cyanosis, joint inflammation, degenerative, or ischemic change. MUSCULOSKELETAL:   There is no abnormal tone, muscle atrophy, or abnormal movement present. NEURO:   The patient is alert and oriented to person, place, and time.   Memory appears intact and mood is normal.  No gross sensorimotor deficits are present. DIAGNOSTIC TESTS:   CT of chest:  ***   CXR:  ***   Spirometry:   No flowsheet data found. Exercise oximetry:  ***    PCXR: No valid procedures specified. CXR PA and lateral:  No results found for this or any previous visit. PET/CT: No valid procedures specified. CT of chest w contrast:  No valid procedures specified. Screening chest CT: No results found for this or any previous visit. CT of chest w/out contrast:   No results found for this or any previous visit. ASSESSMENT:  {No diagnosis found. (Refresh or delete this SmartLink)}    PLAN:  · ***    No orders of the defined types were placed in this encounter. No orders of the defined types were placed in this encounter.         Electronically signed by  Jonelle Umanzor MA

## 2022-07-27 ENCOUNTER — OFFICE VISIT (OUTPATIENT)
Dept: FAMILY MEDICINE CLINIC | Facility: CLINIC | Age: 62
End: 2022-07-27
Payer: COMMERCIAL

## 2022-07-27 VITALS
BODY MASS INDEX: 26.36 KG/M2 | SYSTOLIC BLOOD PRESSURE: 116 MMHG | DIASTOLIC BLOOD PRESSURE: 66 MMHG | HEIGHT: 69 IN | WEIGHT: 178 LBS | OXYGEN SATURATION: 95 % | HEART RATE: 81 BPM

## 2022-07-27 DIAGNOSIS — Z12.5 SCREENING PSA (PROSTATE SPECIFIC ANTIGEN): ICD-10-CM

## 2022-07-27 DIAGNOSIS — Z79.899 HIGH RISK MEDICATION USE: ICD-10-CM

## 2022-07-27 DIAGNOSIS — Z00.00 WELL ADULT EXAM: ICD-10-CM

## 2022-07-27 DIAGNOSIS — H61.22 IMPACTED CERUMEN, LEFT EAR: ICD-10-CM

## 2022-07-27 DIAGNOSIS — E66.3 OVERWEIGHT (BMI 25.0-29.9): ICD-10-CM

## 2022-07-27 DIAGNOSIS — R73.02 IGT (IMPAIRED GLUCOSE TOLERANCE): ICD-10-CM

## 2022-07-27 DIAGNOSIS — Z13.1 SCREENING FOR DIABETES MELLITUS: ICD-10-CM

## 2022-07-27 DIAGNOSIS — J43.9 PULMONARY EMPHYSEMA, UNSPECIFIED EMPHYSEMA TYPE (HCC): ICD-10-CM

## 2022-07-27 DIAGNOSIS — I25.10 CORONARY ARTERY DISEASE DUE TO CALCIFIED CORONARY LESION: ICD-10-CM

## 2022-07-27 DIAGNOSIS — E78.5 DYSLIPIDEMIA: Primary | ICD-10-CM

## 2022-07-27 DIAGNOSIS — Z72.0 TOBACCO ABUSE: ICD-10-CM

## 2022-07-27 DIAGNOSIS — I25.84 CORONARY ARTERY DISEASE DUE TO CALCIFIED CORONARY LESION: ICD-10-CM

## 2022-07-27 PROBLEM — R03.0 ELEVATED BLOOD PRESSURE READING: Status: RESOLVED | Noted: 2022-02-13 | Resolved: 2022-07-27

## 2022-07-27 LAB
ALBUMIN SERPL-MCNC: 4.1 G/DL (ref 3.2–4.6)
ALBUMIN/GLOB SERPL: 1.4 {RATIO} (ref 1.2–3.5)
ALP SERPL-CCNC: 86 U/L (ref 50–136)
ALT SERPL-CCNC: 31 U/L (ref 12–65)
AST SERPL-CCNC: 12 U/L (ref 15–37)
BILIRUB DIRECT SERPL-MCNC: 0.2 MG/DL
BILIRUB SERPL-MCNC: 0.8 MG/DL (ref 0.2–1.1)
CHOLEST SERPL-MCNC: 98 MG/DL
GLOBULIN SER CALC-MCNC: 2.9 G/DL (ref 2.3–3.5)
HDLC SERPL-MCNC: 37 MG/DL (ref 40–60)
HDLC SERPL: 2.6 {RATIO}
LDLC SERPL CALC-MCNC: 32.6 MG/DL
PROT SERPL-MCNC: 7 G/DL (ref 6.3–8.2)
TRIGL SERPL-MCNC: 142 MG/DL (ref 35–150)
VLDLC SERPL CALC-MCNC: 28.4 MG/DL (ref 6–23)

## 2022-07-27 PROCEDURE — 99214 OFFICE O/P EST MOD 30 MIN: CPT | Performed by: FAMILY MEDICINE

## 2022-07-27 RX ORDER — ATORVASTATIN CALCIUM 20 MG/1
20 TABLET, FILM COATED ORAL DAILY
Qty: 90 TABLET | Refills: 3 | Status: SHIPPED | OUTPATIENT
Start: 2022-07-27

## 2022-07-27 ASSESSMENT — ENCOUNTER SYMPTOMS
BACK PAIN: 0
ABDOMINAL PAIN: 0
ALLERGIC/IMMUNOLOGIC NEGATIVE: 1
DIARRHEA: 0
EYES NEGATIVE: 1
SHORTNESS OF BREATH: 0
COUGH: 0
WHEEZING: 0
CONSTIPATION: 0
NAUSEA: 0
CHEST TIGHTNESS: 0
VOMITING: 0

## 2022-07-27 NOTE — PROGRESS NOTES
Estephanie Morgan DO                Diplomate of the [employer] of [facility] [place]         [phone]    Perez Garcia is a 65 y.o. male who was seen on 7/27/2022 for   Chief Complaint   Patient presents with    Cholesterol Problem       Assessment & Plan     Diagnosis Orders   1. Dyslipidemia  Hepatic Function Panel    Lipid Panel    atorvastatin (LIPITOR) 20 MG tablet    Lipid Panel    Hepatic Function Panel    Lipid Panel    Comprehensive Metabolic Panel    Recheck lipids today. LDL goal less than 70      2. Coronary artery disease due to calcified coronary lesion  Hepatic Function Panel    Lipid Panel    Lipid Panel    Hepatic Function Panel    Lipid Panel    Asymptomatic      3. IGT (impaired glucose tolerance)  Hemoglobin A1C    Hemoglobin A1C    Comprehensive Metabolic Panel    Hemoglobin A1C    Recheck hemoglobin A1c today. Last time it was 6.2.      4. Overweight (BMI 25.0-29. 9)      Encouraged weight loss to about 165 pounds. 5. Pulmonary emphysema, unspecified emphysema type ([address]      Improved with Trelegy. Now following with Dr. Melvin Alba      6. Tobacco abuse      Encouraged smoking cessation      7. Impacted cerumen, left ear      Removed with ear loop      8. High risk medication use  Hepatic Function Panel    Hepatic Function Panel    Comprehensive Metabolic Panel      9. Screening PSA (prostate specific antigen)  PSA Screening      10. Well adult exam  Lipid Panel    Comprehensive Metabolic Panel    PSA Screening    Hemoglobin A1C      11. Screening for diabetes mellitus  Comprehensive Metabolic Panel          No problem-specific Assessment & Plan notes found for this encounter. Follow-up and Dispositions    Return for PREV SCHED APPT, LABS TODAY. RECENT LABS/TESTS TO REVIEW and DISCUSS    No results found for this visit on 07/27/22.     Subjective    HPI: Please see my assessment and plan notes (above) for individual problems addressed today. Other important information: This is a 50-year-old male patient who is seen today for follow-up on dyslipidemia and coronary artery disease. The patient did not have labs in advance of today's visit. Instead we will draw labs today or at a future date. Since I last saw the patient, he had a coronary calcium score of 465. As a result, Dr. Queen Lozano sent him to see a cardiologist.  He saw Dr. Anny Melendez and underwent some cardiac testing including a nuclear stress test which was normal although the patient was unable to exercise adequately due to shortness of breath from his COPD. As a result, the patient had a consult with Dr. Jen Huang at [facility] in June. Dr. Bryn Torres has been taking Trelegy now instead of Breo and the patient says he is doing significantly better with his shortness of breath. Patient denies any side effects on atorvastatin 20 mg which she has been taking now since March. No follow-up lab testing has yet been done to recheck lipids and liver function. I will do those today. Reviewed and updated this visit by provider:           Review of Systems   Constitutional:  Negative for fatigue and fever. HENT: Negative. Eyes: Negative. Respiratory:  Negative for cough, chest tightness, shortness of breath and wheezing. Cardiovascular:  Negative for chest pain and leg swelling. Gastrointestinal:  Negative for abdominal pain, constipation, diarrhea, nausea and vomiting. Endocrine: Negative. Genitourinary:  Negative for difficulty urinating, dysuria and frequency. Musculoskeletal:  Negative for arthralgias, back pain and neck pain. Skin:  Negative for rash. Allergic/Immunologic: Negative. Neurological:  Negative for dizziness and light-headedness. Psychiatric/Behavioral:  Negative for dysphoric mood. The patient is not nervous/anxious.     All other systems reviewed and are negative. Objective    /66 (Site: Left Upper Arm, Position: Sitting, Cuff Size: Medium Adult)   Pulse 81   Ht 5' 9\" (1.753 m)   Wt 178 lb (80.7 kg)   SpO2 95%   BMI 26.29 kg/m²     Physical Exam  Vitals reviewed. Constitutional:       General: He is not in acute distress. Appearance: Normal appearance. He is well-developed and well-groomed. HENT:      Head: Normocephalic and atraumatic. Right Ear: Tympanic membrane, ear canal and external ear normal. There is no impacted cerumen. Left Ear: Tympanic membrane, ear canal and external ear normal. There is impacted cerumen (Removed with ear loop). Ears:      Comments: External ear canal(s) obstructed by cerumen. Successfully debrided with c ear loop. Tolerated well. No incidental bleeding of canal.     Mouth/Throat:      Mouth: Mucous membranes are moist.   Eyes:      General: Lids are normal.      Extraocular Movements: Extraocular movements intact. Conjunctiva/sclera: Conjunctivae normal.      Pupils: Pupils are equal, round, and reactive to light. Neck:      Vascular: No carotid bruit. Cardiovascular:      Rate and Rhythm: Normal rate and regular rhythm. Heart sounds: Normal heart sounds. Pulmonary:      Effort: Pulmonary effort is normal.      Breath sounds: Normal breath sounds. Abdominal:      General: Bowel sounds are normal.      Palpations: Abdomen is soft. There is no hepatomegaly, splenomegaly or mass. Tenderness: no abdominal tenderness      Hernia: No hernia is present. There is no hernia in the left inguinal area or right inguinal area. Genitourinary:     Penis: Normal.       Testes: Normal.      Epididymis:      Right: Normal.      Left: Normal.   Musculoskeletal:      Cervical back: Neck supple. No rigidity or tenderness. Lymphadenopathy:      Cervical: No cervical adenopathy. Skin:     General: Skin is warm and dry. Neurological:      General: No focal deficit present.       Mental Status: He is alert and oriented to person, place, and time. Cranial Nerves: Cranial nerves are intact. Sensory: Sensation is intact. Motor: Motor function is intact. Deep Tendon Reflexes: Reflexes are normal and symmetric. Psychiatric:         Attention and Perception: Attention and perception normal.         Mood and Affect: Mood and affect normal.         Speech: Speech normal.         Behavior: Behavior normal. Behavior is cooperative. Thought Content: Thought content normal.         Cognition and Memory: Cognition and memory normal.         Judgment: Judgment normal.       On this date 07/27/2022 I have spent 32 minutes reviewing previous notes, lab/imaging results and face to face with the patient discussing the diagnoses and importance of compliance with the treatment plan, as well as documenting on the day of the visit.         Jo Ann Duran DO  [facility]

## 2022-07-28 LAB
EST. AVERAGE GLUCOSE BLD GHB EST-MCNC: 117 MG/DL
HBA1C MFR BLD: 5.7 % (ref 4.8–5.6)

## 2022-11-04 ENCOUNTER — NURSE ONLY (OUTPATIENT)
Dept: PULMONOLOGY | Age: 62
End: 2022-11-04
Payer: COMMERCIAL

## 2022-11-04 DIAGNOSIS — R73.02 IGT (IMPAIRED GLUCOSE TOLERANCE): ICD-10-CM

## 2022-11-04 DIAGNOSIS — J44.9 CHRONIC OBSTRUCTIVE PULMONARY DISEASE, UNSPECIFIED COPD TYPE (HCC): Primary | ICD-10-CM

## 2022-11-04 DIAGNOSIS — Z00.00 WELL ADULT EXAM: ICD-10-CM

## 2022-11-04 LAB
FEV 1 , POC: 1.82 L
FEV1 % PRED, POC: 51 %
FEV1/FVC, POC: NORMAL
FVC % PRED, POC: 82 %
FVC, POC: NORMAL
TOTAL HEMOGLOBIN (SPHB), POC: 16.3 G/DL

## 2022-11-04 PROCEDURE — 88738 HGB QUANT TRANSCUTANEOUS: CPT | Performed by: INTERNAL MEDICINE

## 2022-11-04 PROCEDURE — 94726 PLETHYSMOGRAPHY LUNG VOLUMES: CPT | Performed by: INTERNAL MEDICINE

## 2022-11-04 PROCEDURE — 94729 DIFFUSING CAPACITY: CPT | Performed by: INTERNAL MEDICINE

## 2022-11-04 PROCEDURE — 94060 EVALUATION OF WHEEZING: CPT | Performed by: INTERNAL MEDICINE

## 2022-11-04 ASSESSMENT — PULMONARY FUNCTION TESTS
FVC_PERCENT_PREDICTED_POC: 82
FEV1_PERCENT_PREDICTED_POC: 51

## 2022-11-11 ENCOUNTER — OFFICE VISIT (OUTPATIENT)
Dept: CARDIOLOGY CLINIC | Age: 62
End: 2022-11-11
Payer: COMMERCIAL

## 2022-11-11 VITALS
HEART RATE: 76 BPM | DIASTOLIC BLOOD PRESSURE: 70 MMHG | HEIGHT: 69 IN | BODY MASS INDEX: 26.93 KG/M2 | SYSTOLIC BLOOD PRESSURE: 118 MMHG | WEIGHT: 181.8 LBS

## 2022-11-11 DIAGNOSIS — I25.84 CORONARY ARTERY DISEASE DUE TO CALCIFIED CORONARY LESION: Primary | ICD-10-CM

## 2022-11-11 DIAGNOSIS — Z72.0 TOBACCO ABUSE: ICD-10-CM

## 2022-11-11 DIAGNOSIS — I25.10 CORONARY ARTERY DISEASE DUE TO CALCIFIED CORONARY LESION: Primary | ICD-10-CM

## 2022-11-11 PROCEDURE — 99214 OFFICE O/P EST MOD 30 MIN: CPT | Performed by: INTERNAL MEDICINE

## 2022-11-11 ASSESSMENT — ENCOUNTER SYMPTOMS
EYE PAIN: 0
EYES NEGATIVE: 1
PHOTOPHOBIA: 0
SHORTNESS OF BREATH: 0
ABDOMINAL PAIN: 0
RESPIRATORY NEGATIVE: 1
BACK PAIN: 0
ALLERGIC/IMMUNOLOGIC NEGATIVE: 1
CHEST TIGHTNESS: 0
GASTROINTESTINAL NEGATIVE: 1

## 2022-11-11 NOTE — PROGRESS NOTES
[address] [address], [address]  PHONE: [phone]      22    NAME:  Vannessa Vera  : 1960  MRN: [id number]         SUBJECTIVE:   Vannessa Vera is a 65 y.o. male seen for follow up of:      Chief Complaint   Patient presents with    Coronary Artery Disease    6 Month Follow-Up        Cardiac Hx (Reviewed and summarized by me):  1) CT calcium score - 465 (80%ile for age)  2) FHx - youger brother  at 48 of an AMI  3) COPD  4) Smoker - 1 ppd to 1.5 ppd  5) NM stress - normal perfusion, severe SOB could not get target HR  6) HLD              22 - HDL 26, , Trig 132   22 - HDL 37, LDL 32.6, Trig 142      HPI:  Doing well. Denies chest pain palpitations orthopnea PND lower extremity edema. Continues to smoke a little bit more than a pack a day. His last lipid panel was listed above and shows excellent control. Blood pressure is controlled and he does not take blood pressure medications. Past Medical History, Past Surgical History, Family history, Social History, and Medications were all reviewed with the patient today and updated as necessary.        Current Outpatient Medications:     atorvastatin (LIPITOR) 20 MG tablet, Take 1 tablet by mouth in the morning., Disp: 90 tablet, Rfl: 3    aspirin EC 81 MG EC tablet, Take 81 mg by mouth daily, Disp: , Rfl:     fluticasone-umeclidin-vilant (TRELEGY ELLIPTA) 100-62.5-25 MCG/INH AEPB, Inhale 1 puff into the lungs daily, Disp: 1 each, Rfl: 11    Multiple Vitamin (MULTIVITAMIN PO), Take 1 tablet by mouth daily, Disp: , Rfl:     albuterol sulfate  (90 Base) MCG/ACT inhaler, Inhale 2 puffs into the lungs every 4 hours as needed, Disp: , Rfl:     albuterol (PROVENTIL) (2.5 MG/3ML) 0.083% nebulizer solution, Inhale 2.5 mg into the lungs every 4 hours as needed, Disp: , Rfl:     ibuprofen (ADVIL;MOTRIN) 200 MG tablet, Take 400 mg by mouth as needed, Disp: , Rfl:   Allergies Allergen Reactions    Shellfish-Derived Products Other (See Comments)     Dizzy      Past Medical History:   Diagnosis Date    Allergic rhinitis     Chronic obstructive pulmonary disease (HCC)     inhaler prn, smoker    Cough due to bronchospasm 2/11/2020    chest discomfort (ribs) with cough    Elevated blood pressure reading 2/13/2022    Frequent BP checks and report 2 weeks     Pneumonia     twice. Last episode was 2016     Past Surgical History:   Procedure Laterality Date    HAND SURGERY Right 02/2022    HERNIA REPAIR  1967    inguinal     Family History   Problem Relation Age of Onset    No Known Problems Father     Cancer Mother      Social History     Tobacco Use    Smoking status: Every Day     Packs/day: 1.50     Years: 40.00     Pack years: 60.00     Types: Cigarettes    Smokeless tobacco: Never    Tobacco comments:     Quit smoking: trying   Substance Use Topics    Alcohol use: Not Currently       ROS:  Review of Systems   Constitutional: Negative. Negative for fever. HENT:  Negative for hearing loss, nosebleeds and tinnitus. Eyes: Negative. Negative for photophobia and pain. Respiratory: Negative. Negative for chest tightness and shortness of breath. Cardiovascular: Negative. Negative for chest pain, palpitations and leg swelling. Gastrointestinal: Negative. Negative for abdominal pain. Endocrine: Negative. Negative for cold intolerance and heat intolerance. Genitourinary: Negative. Negative for dysuria. Musculoskeletal: Negative. Negative for back pain and joint swelling. Skin: Negative. Negative for rash. Allergic/Immunologic: Negative. Negative for immunocompromised state. Neurological: Negative. Negative for dizziness, syncope and light-headedness. Hematological: Negative. Does not bruise/bleed easily. Psychiatric/Behavioral: Negative. Negative for suicidal ideas.           PHYSICAL EXAM:  Physical Exam  Constitutional:       General: He is not in acute distress. Appearance: He is not ill-appearing. HENT:      Head: Normocephalic and atraumatic. Nose: No congestion. Mouth/Throat:      Mouth: Mucous membranes are moist.   Eyes:      Extraocular Movements: Extraocular movements intact. Pupils: Pupils are equal, round, and reactive to light. Cardiovascular:      Rate and Rhythm: Normal rate and regular rhythm. Heart sounds: No murmur heard. No friction rub. No gallop. Pulmonary:      Effort: No respiratory distress. Breath sounds: No wheezing or rhonchi. Musculoskeletal:         General: No swelling. Cervical back: Normal range of motion. Right lower leg: No edema. Left lower leg: No edema. Skin:     General: Skin is warm and dry. Findings: No rash. Neurological:      General: No focal deficit present. Mental Status: He is oriented to person, place, and time. Psychiatric:         Mood and Affect: Mood normal.         Behavior: Behavior normal.         Judgment: Judgment normal.        /70   Pulse 76   Ht 5' 9\" (1.753 m)   Wt 181 lb 12.8 oz (82.5 kg)   BMI 26.85 kg/m²      Wt Readings from Last 10 Encounters:   11/11/22 181 lb 12.8 oz (82.5 kg)   07/27/22 178 lb (80.7 kg)   06/30/22 176 lb (79.8 kg)   05/06/22 174 lb (78.9 kg)   04/22/22 174 lb (78.9 kg)   04/11/22 174 lb (78.9 kg)   03/17/22 182 lb (82.6 kg)   01/31/22 191 lb (86.6 kg)   04/27/21 187 lb 9.6 oz (85.1 kg)           Medical problems and test results were reviewed with the patient today. Lab Results   Component Value Date/Time    BUN 12 01/31/2022 10:12 AM     No results found for: GAGE, CREAPOC, CREA  Lab Results   Component Value Date/Time    K 4.2 01/31/2022 10:12 AM       Lab Results   Component Value Date/Time    CHOL 98 07/27/2022 11:22 AM    HDL 37 07/27/2022 11:22 AM    VLDL 24 01/31/2022 10:12 AM       ASSESSMENT and PLAN    ICD-10-CM    1.  Coronary artery disease due to calcified coronary lesion  I25.10 I25.84       2. Tobacco abuse  Z72.0           IMPRESSION:  1) cad -continue aspirin 81 mg daily and Lipitor 20 mg daily   2) hyperlipidemia -he is now at goal of LDL below 70 on 20 mg atorvastatin daily. 3) tobacco abuse have encouraged him to quit        ALL ORDERS THIS ENCOUNTER  No orders of the defined types were placed in this encounter. Follow up in 6 months. Thank you for allowing me to participate in this patient's care. Please call or contact me if there are any questions or concerns regarding the above.       Sakshi Callejas MD  11/11/22  8:17 AM

## 2022-11-14 ENCOUNTER — OFFICE VISIT (OUTPATIENT)
Dept: PULMONOLOGY | Age: 62
End: 2022-11-14
Payer: COMMERCIAL

## 2022-11-14 VITALS
HEART RATE: 83 BPM | HEIGHT: 69 IN | OXYGEN SATURATION: 85 % | BODY MASS INDEX: 27.4 KG/M2 | SYSTOLIC BLOOD PRESSURE: 114 MMHG | TEMPERATURE: 96.6 F | WEIGHT: 185 LBS | DIASTOLIC BLOOD PRESSURE: 78 MMHG | RESPIRATION RATE: 14 BRPM

## 2022-11-14 DIAGNOSIS — J44.9 CHRONIC OBSTRUCTIVE PULMONARY DISEASE, UNSPECIFIED COPD TYPE (HCC): Primary | ICD-10-CM

## 2022-11-14 DIAGNOSIS — F17.210 NICOTINE DEPENDENCE, CIGARETTES, UNCOMPLICATED: ICD-10-CM

## 2022-11-14 DIAGNOSIS — Z87.891 PERSONAL HISTORY OF TOBACCO USE: ICD-10-CM

## 2022-11-14 PROCEDURE — 99214 OFFICE O/P EST MOD 30 MIN: CPT | Performed by: INTERNAL MEDICINE

## 2022-11-14 PROCEDURE — G0296 VISIT TO DETERM LDCT ELIG: HCPCS | Performed by: INTERNAL MEDICINE

## 2022-11-14 NOTE — PROGRESS NOTES
[facility]: FOLLOW-UP Patient Office Visit Note  Ruthie Son , Yohan Coelho. [address], [address] [facility]  [phone]    Patient Name:  Yanely Zimmer  YOB: 1960            Date of Service:  11/14/2022    Chief Complaint   Patient presents with    Follow-up    COPD       History of Present Illness:  Christa Bruner is a 81-year-old white male with history of COPD last seen June 2022. On his evaluation in June PFTs revealed severe airway obstruction. The patient was placed on Trelegy and has noted improvement on that. Since his last visit complete PFTs were done and his FEV1 went from 1.65 L to 1.82 L. Lung volumes were increased and diffusion was severely decreased. Unfortunately patient has not been able to decrease his smoking. Is still a pack and a half a day. He is accumulated about 60 pack years. Past Medical History:   Diagnosis Date    Allergic rhinitis     Chronic obstructive pulmonary disease (HCC)     inhaler prn, smoker    Cough due to bronchospasm 2/11/2020    chest discomfort (ribs) with cough    Elevated blood pressure reading 2/13/2022    Frequent BP checks and report 2 weeks     Pneumonia     twice. Last episode was 2016       Patient Active Problem List   Diagnosis    Overweight (BMI 25.0-29. 9)    Colonoscopy refused    Well adult exam    Screening PSA (prostate specific antigen)    Chronic bronchitis (HCC)    Smoking greater than 30 pack years    Hemochromatosis, hereditary ([address]    Distorted taste    Dupuytren's contracture of right hand    COVID-19 vaccine series completed    Refused influenza vaccine    Tobacco abuse    Pulmonary emphysema ([address]    IGT (impaired glucose tolerance)    Screening for diabetes mellitus    History of pneumonia    Family history of premature CAD    Dyslipidemia    Abnormal cardiovascular function study    Coronary artery disease due to calcified coronary lesion    High risk medication use         Past Surgical History:   Procedure Laterality Date    HAND SURGERY Right 02/2022    HERNIA REPAIR  1967    inguinal       Social History     Socioeconomic History    Marital status:      Spouse name: Not on file    Number of children: Not on file    Years of education: Not on file    Highest education level: Not on file   Occupational History    Not on file   Tobacco Use    Smoking status: Every Day     Packs/day: 1.50     Years: 40.00     Pack years: 60.00     Types: Cigarettes    Smokeless tobacco: Never    Tobacco comments:     Quit smoking: trying   Substance and Sexual Activity    Alcohol use: Not Currently    Drug use: No    Sexual activity: Not on file   Other Topics Concern    Not on file   Social History Narrative    Not on file     Social Determinants of Health     Financial Resource Strain: Not on file   Food Insecurity: Not on file   Transportation Needs: Not on file   Physical Activity: Not on file   Stress: Not on file   Social Connections: Not on file   Intimate Partner Violence: Not on file   Housing Stability: Not on file       Family History   Problem Relation Age of Onset    No Known Problems Father     Cancer Mother        Allergies   Allergen Reactions    Shellfish-Derived Products Other (See Comments)     Dizzy        ? Review of Systems    OBJECTIVE:  Physical Exam:  Vitals:    11/14/22 1508   BP: 114/78   Pulse: 83   Resp: 14   Temp: (!) 96.6 °F (35.9 °C)   SpO2: (!) 85%        GENERAL APPEARANCE:   The patient is normal weight and in no respiratory distress. HEENT:   PERRL. Conjunctivae unremarkable. Nasal mucosa is without epistaxis, exudate, or polyps. Gums and dentition are unremarkable. There is no oropharyngeal narrowing. TMs are clear. NECK/LYMPHATIC:   Symmetrical with no elevation of jugular venous pulsation. Trachea midline. No thyroid enlargement. No cervical adenopathy.      LUNGS:   Normal respiratory effort with symmetrical lung expansion. Breath sounds clear. HEART:   There is a regular rate and rhythm. No murmur, rub, or gallop. There is no edema in the lower extremities. ABDOMEN:   Soft and non-tender. No hepatosplenomegaly. Bowel sounds are normal.       NEURO:   The patient is alert and oriented to person, place, and time. Memory appears intact and mood is normal.  No gross sensorimotor deficits are present. Current Outpatient Medications   Medication Instructions    albuterol (PROVENTIL) 2.5 mg, Inhalation, EVERY 4 HOURS PRN    albuterol sulfate  (90 Base) MCG/ACT inhaler 2 puffs, Inhalation, EVERY 4 HOURS PRN    aspirin EC 81 mg, Oral, DAILY    atorvastatin (LIPITOR) 20 mg, Oral, DAILY    fluticasone-umeclidin-vilant (TRELEGY ELLIPTA) 100-62.5-25 MCG/INH AEPB 1 puff, Inhalation, DAILY    ibuprofen (ADVIL;MOTRIN) 400 mg, Oral, PRN    Multiple Vitamin (MULTIVITAMIN PO) 1 tablet, Oral, DAILY         DIAGNOSTIC TESTS:    CXR:    No results found for this or any previous visit from the past 365 days. CT WITHOUT CONTRAST:    No results found for this or any previous visit from the past 365 days. CT WITH CONTRAST:    No results found for this or any previous visit from the past 365 days. CT HIGH RES:    No results found for this or any previous visit from the past 365 days. CT PE PROTOCOL:    No results found for this or any previous visit from the past 365 days. LDCT SCREENING:    No results found for this or any previous visit from the past 365 days. PET SCAN:    No results found for this or any previous visit from the past 365 days. Spirometry:      Office Spirometry Results Latest Ref Rng & Units 6/30/2022   FVC L 3.46   FEV1 L 1.65   FEV1 %PRED-PRE % 47   FVC %PRED-PRE % 75   FEV1/FVC % 48       Exercise oximetry:          ASSESSMENT:   Diagnosis Orders   1. Chronic obstructive pulmonary disease, unspecified COPD type (HCC)  Stage II gold      2.  Nicotine dependence, cigarettes, uncomplicated            PLAN:   Trelegy 1 puff a day will be continued  Follow-up in 6 months  Repeat chest CT scan in March  Smoking cessation again emphasized  He was supposed to have an alpha-1 antitrypsin test done last visit but we are unable to find results, when he returns we will need to get this test done    Orders Placed This Encounter   Procedures    CT Lung Screen (Annual)     Age: Patient is 65 y.o. Smoking History: Social History    Tobacco Use      Smoking status: Every Day        Packs/day: 1.50        Years: 40.00        Pack years: 61        Types: Cigarettes      Smokeless tobacco: Never      Tobacco comments: Quit smoking: trying    Alcohol use: Not Currently    Drug use: No   Pack years: 61    Date of last lung cancer screening: No previous lung cancer screening exam     Standing Status:   Future     Standing Expiration Date:   5/14/2024     Order Specific Question:   Is there documentation of shared decision making? Answer:   Yes     Order Specific Question:   Is this a low dose CT or a routine CT? Answer:   Low Dose CT [1]     Order Specific Question:   Is this the first (baseline) CT or an annual exam?     Answer: Annual [2]     Order Specific Question:   Does the patient show any signs or symptoms of lung cancer? Answer:   No     Order Specific Question:   Smoking Status? Answer:   Every Day [1]     Order Specific Question:   Smoking packs per day? Answer:   1.5     Order Specific Question:   Years smoking? Answer:   40    DC VISIT TO DISCUSS LUNG CA SCREEN W LDCT         No orders of the defined types were placed in this encounter. Electronically signed by  Anant Hung MD    Discussed with the patient the current USPSTF guidelines released March 9, 2021 for screening for lung cancer.     For adults aged 48 to [de-identified] years who have a 20 pack-year smoking history and currently smoke or have quit within the past 15 years the grade B recommendation is to:  Screen for lung cancer with low-dose computed tomography (LDCT) every year. Stop screening once a person has not smoked for 15 years or has a health problem that limits life expectancy or the ability to have lung surgery. The patient  reports that he has been smoking cigarettes. He has a 60.00 pack-year smoking history. He has never used smokeless tobacco.. Discussed with patient the risks and benefits of screening, including over-diagnosis, false positive rate, and total radiation exposure. The patient currently exhibits no signs or symptoms suggestive of lung cancer. Discussed with patient the importance of compliance with yearly annual lung cancer screenings and willingness to undergo diagnosis and treatment if screening scan is positive. In addition, the patient was counseled regarding the importance of remaining smoke free and/or total smoking cessation.     Also reviewed the following if the patient has Medicare that as of February 10, 2022, Medicare only covers LDCT screening in patients aged 51-72 with at least a 20 pack-year smoking history who currently smoke or have quit in the last 15 years

## 2022-11-14 NOTE — PATIENT INSTRUCTIONS

## 2022-11-28 ENCOUNTER — OFFICE VISIT (OUTPATIENT)
Dept: FAMILY MEDICINE CLINIC | Facility: CLINIC | Age: 62
End: 2022-11-28
Payer: COMMERCIAL

## 2022-11-28 VITALS
HEART RATE: 67 BPM | SYSTOLIC BLOOD PRESSURE: 110 MMHG | BODY MASS INDEX: 26.36 KG/M2 | HEIGHT: 69 IN | OXYGEN SATURATION: 94 % | DIASTOLIC BLOOD PRESSURE: 66 MMHG | WEIGHT: 178 LBS

## 2022-11-28 DIAGNOSIS — Z28.21 REFUSED INFLUENZA VACCINE: ICD-10-CM

## 2022-11-28 DIAGNOSIS — G44.029 CHRONIC CLUSTER HEADACHE, NOT INTRACTABLE: Primary | ICD-10-CM

## 2022-11-28 DIAGNOSIS — Z72.0 TOBACCO ABUSE: Chronic | ICD-10-CM

## 2022-11-28 DIAGNOSIS — Z12.11 SCREEN FOR COLON CANCER: ICD-10-CM

## 2022-11-28 DIAGNOSIS — E66.3 OVERWEIGHT (BMI 25.0-29.9): Chronic | ICD-10-CM

## 2022-11-28 DIAGNOSIS — Z11.4 SCREENING FOR HIV WITHOUT PRESENCE OF RISK FACTORS: ICD-10-CM

## 2022-11-28 DIAGNOSIS — R73.02 IGT (IMPAIRED GLUCOSE TOLERANCE): Chronic | ICD-10-CM

## 2022-11-28 DIAGNOSIS — Z53.20 COLONOSCOPY REFUSED: ICD-10-CM

## 2022-11-28 PROBLEM — Z79.899 HIGH RISK MEDICATION USE: Chronic | Status: ACTIVE | Noted: 2022-07-27

## 2022-11-28 PROBLEM — E78.5 DYSLIPIDEMIA: Chronic | Status: ACTIVE | Noted: 2020-02-11

## 2022-11-28 PROBLEM — Z13.1 SCREENING FOR DIABETES MELLITUS: Chronic | Status: ACTIVE | Noted: 2020-02-11

## 2022-11-28 PROBLEM — I25.10 CORONARY ARTERY DISEASE DUE TO CALCIFIED CORONARY LESION: Chronic | Status: ACTIVE | Noted: 2022-07-27

## 2022-11-28 PROBLEM — J42 CHRONIC BRONCHITIS (HCC): Chronic | Status: ACTIVE | Noted: 2020-02-11

## 2022-11-28 PROBLEM — J43.9 PULMONARY EMPHYSEMA (HCC): Chronic | Status: ACTIVE | Noted: 2018-11-29

## 2022-11-28 PROBLEM — Z12.5 SCREENING PSA (PROSTATE SPECIFIC ANTIGEN): Chronic | Status: ACTIVE | Noted: 2020-02-11

## 2022-11-28 PROBLEM — Z00.00 WELL ADULT EXAM: Chronic | Status: ACTIVE | Noted: 2022-02-13

## 2022-11-28 PROBLEM — I25.84 CORONARY ARTERY DISEASE DUE TO CALCIFIED CORONARY LESION: Chronic | Status: ACTIVE | Noted: 2022-07-27

## 2022-11-28 LAB
BASOPHILS # BLD: 0 K/UL (ref 0–0.2)
BASOPHILS NFR BLD: 0 % (ref 0–2)
DIFFERENTIAL METHOD BLD: ABNORMAL
EOSINOPHIL # BLD: 0.2 K/UL (ref 0–0.8)
EOSINOPHIL NFR BLD: 2 % (ref 0.5–7.8)
ERYTHROCYTE [DISTWIDTH] IN BLOOD BY AUTOMATED COUNT: 12.3 % (ref 11.9–14.6)
HCT VFR BLD AUTO: 53.3 % (ref 41.1–50.3)
HGB BLD-MCNC: 18.2 G/DL (ref 13.6–17.2)
IMM GRANULOCYTES # BLD AUTO: 0.1 K/UL (ref 0–0.5)
IMM GRANULOCYTES NFR BLD AUTO: 1 % (ref 0–5)
LYMPHOCYTES # BLD: 2.1 K/UL (ref 0.5–4.6)
LYMPHOCYTES NFR BLD: 21 % (ref 13–44)
MCH RBC QN AUTO: 33.4 PG (ref 26.1–32.9)
MCHC RBC AUTO-ENTMCNC: 34.1 G/DL (ref 31.4–35)
MCV RBC AUTO: 97.8 FL (ref 82–102)
MONOCYTES # BLD: 0.7 K/UL (ref 0.1–1.3)
MONOCYTES NFR BLD: 7 % (ref 4–12)
NEUTS SEG # BLD: 7 K/UL (ref 1.7–8.2)
NEUTS SEG NFR BLD: 69 % (ref 43–78)
NRBC # BLD: 0 K/UL (ref 0–0.2)
PLATELET # BLD AUTO: 220 K/UL (ref 150–450)
PMV BLD AUTO: 9 FL (ref 9.4–12.3)
RBC # BLD AUTO: 5.45 M/UL (ref 4.23–5.6)
WBC # BLD AUTO: 10.1 K/UL (ref 4.3–11.1)

## 2022-11-28 PROCEDURE — 99214 OFFICE O/P EST MOD 30 MIN: CPT | Performed by: FAMILY MEDICINE

## 2022-11-28 RX ORDER — SUMATRIPTAN 50 MG/1
50 TABLET, FILM COATED ORAL
Qty: 12 TABLET | Refills: 1 | Status: SHIPPED | OUTPATIENT
Start: 2022-11-28 | End: 2022-11-28

## 2022-11-28 RX ORDER — BUTALBITAL, ACETAMINOPHEN AND CAFFEINE 50; 325; 40 MG/1; MG/1; MG/1
1 TABLET ORAL EVERY 4 HOURS PRN
Qty: 30 TABLET | Refills: 1 | Status: SHIPPED | OUTPATIENT
Start: 2022-11-28

## 2022-11-28 RX ORDER — SUMATRIPTAN 25 MG/1
25 TABLET, FILM COATED ORAL
COMMUNITY
End: 2022-11-28 | Stop reason: SDUPTHER

## 2022-11-28 ASSESSMENT — ENCOUNTER SYMPTOMS
VOMITING: 0
ABDOMINAL PAIN: 0
ALLERGIC/IMMUNOLOGIC NEGATIVE: 1
COUGH: 0
CHEST TIGHTNESS: 0
WHEEZING: 0
BACK PAIN: 0
DIARRHEA: 0
SHORTNESS OF BREATH: 0
CONSTIPATION: 0
NAUSEA: 0
EYES NEGATIVE: 1

## 2022-11-28 ASSESSMENT — PATIENT HEALTH QUESTIONNAIRE - PHQ9
SUM OF ALL RESPONSES TO PHQ9 QUESTIONS 1 & 2: 0
SUM OF ALL RESPONSES TO PHQ QUESTIONS 1-9: 0
2. FEELING DOWN, DEPRESSED OR HOPELESS: 0
SUM OF ALL RESPONSES TO PHQ QUESTIONS 1-9: 0
SUM OF ALL RESPONSES TO PHQ QUESTIONS 1-9: 0
1. LITTLE INTEREST OR PLEASURE IN DOING THINGS: 0
SUM OF ALL RESPONSES TO PHQ QUESTIONS 1-9: 0

## 2022-11-28 NOTE — PROGRESS NOTES
Hugo Riddle DO                Diplomate of the [employer] of [facility] [place]         [phone]    Erica Caban is a 65 y.o. male who was seen on 11/28/2022 for   Chief Complaint   Patient presents with    Headache    Other     Sx x 1-2 weeks getting worse, tried ibuprofen    Pain behind left eye, seen at [address] yesterday 11/27/2022         Assessment & Plan     Diagnosis Orders   1. Chronic cluster headache, not intractable  butalbital-acetaminophen-caffeine (FIORICET, ESGIC) -40 MG per tablet    SUMAtriptan (IMITREX) 50 MG tablet    CBC with Auto Differential    CBC with Auto Differential    Trial with Fioricet and higher dose of Imitrex if needed. Report if no improvement within 2 weeks. May use ibuprofen 600 mg 3 times daily      2. IGT (impaired glucose tolerance)      Reviewed recent A1c. Improved. Weight loss has helped. 3. Overweight (BMI 25.0-29. 9)      Improved. Low carb diet advised/reviewed. Information given. 4. Tobacco abuse      Quitting smoking strongly encouraged with his pulmonary disease. This may also contribute to migraine/headaches. 5. Colonoscopy refused  Cologuard (Fecal DNA Colorectal Cancer Screening)    Agrees to Cologuard testing      6. Screen for colon cancer  Cologuard (Fecal DNA Colorectal Cancer Screening)    Ordered Cologuard      7. Screening for HIV without presence of risk factors  HIV 1/2 Ag/Ab, 4TH Generation,W Rflx Confirm      8. Refused influenza vaccine      Strongly encouraged with his pulmonary disease          Follow-up and Dispositions    Return if symptoms worsen or fail to improve, for LABS TODAY, also needs ANA MARIA of CPE scheduled 2/3/23 please! .         RECENT LABS/TESTS TO REVIEW and DISCUSS    No results found for this visit on 11/28/22.      Latest Reference Range & Units 1/29/20 08:50 1/31/22 10:12 7/27/22 11:22   Chol/HDL Ratio - 2. 6   CHOLESTEROL, TOTAL, 708465 <200 MG/ 154 98   HDL Cholesterol 40 - 60 MG/DL 29 (L) 26 (L) 37 (L)   LDL Calculated <100 MG/ (H) 104 (H) 32.6   Triglycerides 35 - 150 MG/ (H) 132 142   VLDL 5 - 40 mg/dL  24    VLDL Cholesterol Calculated 6.0 - 23.0 MG/DL 36  28.4 (H)      Latest Reference Range & Units 5/8/20 10:24 1/31/22 10:12 7/27/22 11:22   Hemoglobin A1C 4.8 - 5.6 % 5.8 (H) 6.2 (H) 5.7 (H)       Subjective    HPI:     This is a 70-year-old male patient here today with complaint of headaches. The headaches have been bothersome for about 2 weeks. Generally, they center behind the left eye and are stabbing in nature. He describes them like an ice cream headache. They can be intermittent or constant. Sometimes they last only a few minutes but sometimes they have lasted up to 4 hours. Ibuprofen seems to help but he has only taken a very small dose (200 mg). He was seen in an urgent care recently and given a very small dose of Imitrex 25 mg which could be repeated once if necessary. He has only taken one of them so far and says it does seem to help but did not last very long. We discussed trying a slightly higher dose at 50 mg and he is advised that he may take a second 50 mg tablet after 2 hours if necessary. I have not totally certain this is a migraine headache. It may be some sort of cluster headache. I have given a prescription for Fioricet to take first and he is instructed to reserve the Imitrex for a more severe headache. Associated nausea or vomiting or visual disturbances with his headaches. His blood pressure has been well controlled. He denies any changes in his diet or medications, etc. he drinks a small to moderate amount of caffeine. 1 cup of coffee and about 20 ounces of Coke per day. He does continue to smoke about a pack and a half of cigarettes per day which I have advised against.    GAVE FIT TEST AT [facility] [place]. Not returned!   I have sent an order for Mark test today. I reviewed the results of his most recent lab testing from when he was here in July. At that time his hemoglobin A1c was down to 5.8 (6.2). He has continued to lose weight which I believe has been helpful for his blood sugars. Reviewed and updated this visit by provider:           Review of Systems   Constitutional:  Negative for fatigue and fever. HENT: Negative. Eyes: Negative. Respiratory:  Negative for cough, chest tightness, shortness of breath and wheezing. Cardiovascular:  Negative for chest pain and leg swelling. Gastrointestinal:  Negative for abdominal pain, constipation, diarrhea, nausea and vomiting. Endocrine: Negative. Genitourinary:  Negative for difficulty urinating, dysuria and frequency. Musculoskeletal:  Negative for arthralgias, back pain and neck pain. Skin:  Negative for rash. Allergic/Immunologic: Negative. Neurological:  Negative for dizziness and light-headedness. Psychiatric/Behavioral:  Negative for dysphoric mood. The patient is not nervous/anxious. All other systems reviewed and are negative. Objective    /66 (Site: Left Upper Arm, Position: Sitting, Cuff Size: Large Adult)   Pulse 67   Ht 5' 9\" (1.753 m)   Wt 178 lb (80.7 kg)   SpO2 94%   BMI 26.29 kg/m²     Physical Exam  Vitals reviewed. Constitutional:       General: He is not in acute distress. Appearance: Normal appearance. HENT:      Head: Normocephalic and atraumatic. Right Ear: Tympanic membrane, ear canal and external ear normal.      Left Ear: Tympanic membrane, ear canal and external ear normal.      Mouth/Throat:      Mouth: Mucous membranes are moist.   Eyes:      Extraocular Movements: Extraocular movements intact. Conjunctiva/sclera: Conjunctivae normal.      Pupils: Pupils are equal, round, and reactive to light. Neck:      Vascular: No carotid bruit. Cardiovascular:      Rate and Rhythm: Normal rate and regular rhythm. Heart sounds: Normal heart sounds. Pulmonary:      Effort: Pulmonary effort is normal.      Breath sounds: Normal breath sounds. Abdominal:      General: Bowel sounds are normal.      Palpations: Abdomen is soft. Musculoskeletal:      Cervical back: Neck supple. No rigidity or tenderness. Lymphadenopathy:      Cervical: No cervical adenopathy. Skin:     General: Skin is warm and dry. Neurological:      General: No focal deficit present. Mental Status: He is alert and oriented to person, place, and time. Psychiatric:         Mood and Affect: Mood normal.         Behavior: Behavior normal.         Thought Content: Thought content normal.         Judgment: Judgment normal.       On this date 11/28/2022 I have spent 30 minutes reviewing previous notes, lab/imaging results and face to face with the patient discussing the diagnoses and importance of compliance with the treatment plan, as well as documenting on the day of the visit.         Melo Andersen DO  [facility] [place]

## 2022-11-28 NOTE — PATIENT INSTRUCTIONS
Flu season is here. We are already having cases of influenza A in [address] [facility]. We expect flu cases to rise this year as people relax about masking and are venturing out more even though they are sick :(    Get your flu shot here or at your local pharmacy. Now is the ideal time!    ^^^^^    Ask about the [facility] vaccine at your pharmacy    Remember to ask your pharmacist about the relatively new [facility] vaccine for shingles prevention. One in three adults gets shingles in their lifetime. It is a very painful, blistering skin condition which is related to having had chickenpox earlier in life. But the shingles CAN be prevented with this highly effective vaccine! Check with your pharmacist to see if it is covered by your insurance (many insurance companies do). We do not carry the vaccine in our office due to its expense and because of recent supply issues. If your insurance does not cover the vaccine, the cost with a discount coupon like Gracie Grmarco antonio is about $150 per shot. TWO shots are required to achieve lifetime immunity and are given at least 2 months apart. Those who have had the live vaccine in the past (Zostavax) may still get re-immunized as the new vaccine is significantly better, about 90% effective. The older vaccine was only 60% effective. If you have had the shingles in the past, you are actually MORE likely to get them again, so it is recommended for you as well. If you think that you cannot get shingles because you have never had the chickenpox, I will bet you that you have and just do not know it. I have never tested ANYONE for exposure to the chickenpox virus that it did not come out positive! Besides which, the vaccine for shingles is the same as the vaccine for chickenpox, so you will end up protected against both! And if you were to get the chickenpox as an adult, it is generally a much more severe illness.   So that's even a better reason to get this vaccine ;)    Please have your pharmacist send us a notification if you get a Shingrix vaccine so we can document it in your medical record. <<< SPECIAL NOTE>>>    There had been a national shortage on this vaccine until recently, so if you have asked about it previously, ask again. Some pharmacies had temporarily stopped administering the vaccine during the COVID-19 crisis. Either way your pharmacy might add your name to a waiting list to contact you when it becomes available and they are administering it once again.

## 2022-12-07 ENCOUNTER — TELEPHONE (OUTPATIENT)
Dept: FAMILY MEDICINE CLINIC | Facility: CLINIC | Age: 62
End: 2022-12-07

## 2022-12-08 DIAGNOSIS — G44.029 CHRONIC CLUSTER HEADACHE, NOT INTRACTABLE: ICD-10-CM

## 2022-12-08 NOTE — TELEPHONE ENCOUNTER
Patient called, spoke with patient, requesting refill for Sumatriptan to [facility], [place]   Per patient has only one tablet remaining

## 2022-12-09 RX ORDER — SUMATRIPTAN 50 MG/1
50 TABLET, FILM COATED ORAL
Qty: 12 TABLET | Refills: 1 | OUTPATIENT
Start: 2022-12-09 | End: 2022-12-09

## 2022-12-19 ENCOUNTER — OFFICE VISIT (OUTPATIENT)
Dept: FAMILY MEDICINE CLINIC | Facility: CLINIC | Age: 62
End: 2022-12-19
Payer: COMMERCIAL

## 2022-12-19 VITALS
DIASTOLIC BLOOD PRESSURE: 64 MMHG | OXYGEN SATURATION: 94 % | BODY MASS INDEX: 26.51 KG/M2 | SYSTOLIC BLOOD PRESSURE: 130 MMHG | HEART RATE: 75 BPM | WEIGHT: 179 LBS | HEIGHT: 69 IN

## 2022-12-19 DIAGNOSIS — G44.029 CHRONIC CLUSTER HEADACHE, NOT INTRACTABLE: Primary | ICD-10-CM

## 2022-12-19 DIAGNOSIS — R71.8 ELEVATED HEMATOCRIT: ICD-10-CM

## 2022-12-19 DIAGNOSIS — Z72.0 TOBACCO ABUSE: Chronic | ICD-10-CM

## 2022-12-19 DIAGNOSIS — Z28.21 REFUSED INFLUENZA VACCINE: ICD-10-CM

## 2022-12-19 DIAGNOSIS — G44.029 CHRONIC CLUSTER HEADACHE, NOT INTRACTABLE: ICD-10-CM

## 2022-12-19 DIAGNOSIS — D58.2 ELEVATED HEMOGLOBIN (HCC): ICD-10-CM

## 2022-12-19 PROCEDURE — 99213 OFFICE O/P EST LOW 20 MIN: CPT | Performed by: FAMILY MEDICINE

## 2022-12-19 RX ORDER — BUTALBITAL, ACETAMINOPHEN AND CAFFEINE 50; 325; 40 MG/1; MG/1; MG/1
1 TABLET ORAL EVERY 4 HOURS PRN
Qty: 30 TABLET | Refills: 1 | Status: SHIPPED | OUTPATIENT
Start: 2022-12-19

## 2022-12-19 RX ORDER — SUMATRIPTAN 100 MG/1
100 TABLET, FILM COATED ORAL
Qty: 9 TABLET | Refills: 2 | Status: SHIPPED | OUTPATIENT
Start: 2022-12-19 | End: 2022-12-19

## 2022-12-19 ASSESSMENT — ENCOUNTER SYMPTOMS
SHORTNESS OF BREATH: 0
ALLERGIC/IMMUNOLOGIC NEGATIVE: 1
VOMITING: 0
EYES NEGATIVE: 1
ABDOMINAL PAIN: 0
BACK PAIN: 0
CONSTIPATION: 0
CHEST TIGHTNESS: 0
WHEEZING: 0
DIARRHEA: 0
NAUSEA: 0
COUGH: 0

## 2022-12-19 NOTE — PATIENT INSTRUCTIONS
Flu season is here. We are having a heavy amount of influenza A in [place] resulting in a \"trifecta\" of influenza, RSV and COVID 19. Please be prepared by doing the single most important thing to prevent the flu. .. getting a flu shot! Get your flu shot here or at your local pharmacy. Don't wait any longer.

## 2022-12-19 NOTE — PROGRESS NOTES
Rama Alonso DO                Diplomate of the [employer] of [facility] [place]         [phone]    Shanelle Salamanca is a 65 y.o. male who was seen on 12/19/2022 for   Chief Complaint   Patient presents with    Headache         Assessment & Plan     Diagnosis Orders   1. Chronic cluster headache, not intractable  MRI BRAIN WO CONTRAST    SUMAtriptan (IMITREX) 100 MG tablet    butalbital-acetaminophen-caffeine (FIORICET, ESGIC) -40 MG per tablet      2. Refused influenza vaccine        3. Chronic cluster headache, not intractable  MRI BRAIN WO CONTRAST    SUMAtriptan (IMITREX) 100 MG tablet    butalbital-acetaminophen-caffeine (FIORICET, ESGIC) -40 MG per tablet    Trial with Fioricet and higher dose of Imitrex if needed. Report if no improvement within 2 weeks. May use ibuprofen 600 mg 3 times daily      4. Elevated hemoglobin (HCC)        5. Elevated hematocrit        6. Tobacco abuse                RECENT LABS/TESTS TO REVIEW and DISCUSS    No results found for this visit on 12/19/22. Subjective    HPI:     This is a 58-year-old male patient here today for follow-up on chronic daily headaches. After his last routine visit, he began taking Imitrex which I had prescribed as needed for migrainous headache. He was told that he could second pill if necessary for migraine. Prescribed 12 pills with 1 refill and the patient went through 24 pills in just about 2 weeks time! When he called for refills I had my nurse advised him that he was not to be taking this medication on a regular basis. It was meant only for severe migraine headaches. We will have him increase his dose of Imitrex to 100 mg (he may occasionally take a second dose after 2 hours if his headache persists). Refilled butalbital (Fioricet) as this has been helpful.   He should take this medication preferentially and save the Imitrex for severe migraine. NOTE:  Obtained CBC this day indicating high H/H. Advised donating blood (has not been doing regularly). This COULD explain headaches. ALSO QUIT SMOKING! Because of the length of time and the persistent nature of this headache complex we will get MRI of the brain. Reviewed and updated this visit by provider:           Review of Systems   Constitutional:  Negative for fatigue and fever. HENT: Negative. Eyes: Negative. Respiratory:  Negative for cough, chest tightness, shortness of breath and wheezing. Cardiovascular:  Negative for chest pain and leg swelling. Gastrointestinal:  Negative for abdominal pain, constipation, diarrhea, nausea and vomiting. Endocrine: Negative. Genitourinary:  Negative for difficulty urinating, dysuria and frequency. Musculoskeletal:  Negative for arthralgias, back pain and neck pain. Skin:  Negative for rash. Allergic/Immunologic: Negative. Neurological:  Positive for headaches (left post occular). Negative for dizziness and light-headedness. Psychiatric/Behavioral:  Negative for dysphoric mood. The patient is not nervous/anxious. All other systems reviewed and are negative. Objective    /64 (Site: Left Upper Arm, Position: Sitting, Cuff Size: Large Adult)   Pulse 75   Ht 5' 9\" (1.753 m)   Wt 179 lb (81.2 kg)   SpO2 94%   BMI 26.43 kg/m²     Physical Exam  Vitals reviewed. Constitutional:       General: He is not in acute distress. Appearance: Normal appearance. HENT:      Head: Normocephalic and atraumatic. Right Ear: Tympanic membrane, ear canal and external ear normal.      Left Ear: Tympanic membrane, ear canal and external ear normal.      Mouth/Throat:      Mouth: Mucous membranes are moist.   Eyes:      Extraocular Movements: Extraocular movements intact. Conjunctiva/sclera: Conjunctivae normal.      Pupils: Pupils are equal, round, and reactive to light.    Neck:      Vascular: No carotid bruit. Cardiovascular:      Rate and Rhythm: Normal rate and regular rhythm. Heart sounds: Normal heart sounds. Pulmonary:      Effort: Pulmonary effort is normal.      Breath sounds: Normal breath sounds. Abdominal:      General: Bowel sounds are normal.      Palpations: Abdomen is soft. Musculoskeletal:      Cervical back: Neck supple. No rigidity or tenderness. Lymphadenopathy:      Cervical: No cervical adenopathy. Skin:     General: Skin is warm and dry. Neurological:      General: No focal deficit present. Mental Status: He is alert and oriented to person, place, and time. Psychiatric:         Mood and Affect: Mood normal.         Behavior: Behavior normal.         Thought Content: Thought content normal.         Judgment: Judgment normal.       On this date 12/19/2022 I have spent 31 minutes reviewing previous notes, lab/imaging results and face to face with the patient discussing the diagnoses and importance of compliance with the treatment plan, as well as documenting on the day of the visit.         Betty Leal DO  [facility] [place]

## 2022-12-28 PROBLEM — Z00.00 WELL ADULT EXAM: Chronic | Status: RESOLVED | Noted: 2022-02-13 | Resolved: 2022-12-28

## 2022-12-28 PROBLEM — Z13.1 SCREENING FOR DIABETES MELLITUS: Chronic | Status: RESOLVED | Noted: 2020-02-11 | Resolved: 2022-12-28

## 2022-12-28 PROBLEM — Z12.11 SCREEN FOR COLON CANCER: Status: RESOLVED | Noted: 2022-11-28 | Resolved: 2022-12-28

## 2022-12-28 PROBLEM — Z12.5 SCREENING PSA (PROSTATE SPECIFIC ANTIGEN): Chronic | Status: RESOLVED | Noted: 2020-02-11 | Resolved: 2022-12-28

## 2022-12-29 ENCOUNTER — HOSPITAL ENCOUNTER (OUTPATIENT)
Dept: MRI IMAGING | Age: 62
Discharge: HOME | End: 2022-12-29
Payer: COMMERCIAL

## 2022-12-29 DIAGNOSIS — G44.029 CHRONIC CLUSTER HEADACHE, NOT INTRACTABLE: ICD-10-CM

## 2022-12-29 PROCEDURE — 70551 MRI BRAIN STEM W/O DYE: CPT

## 2023-01-30 ENCOUNTER — NURSE ONLY (OUTPATIENT)
Dept: FAMILY MEDICINE CLINIC | Facility: CLINIC | Age: 63
End: 2023-01-30

## 2023-01-30 DIAGNOSIS — E78.5 DYSLIPIDEMIA: ICD-10-CM

## 2023-01-30 DIAGNOSIS — Z12.5 SCREENING PSA (PROSTATE SPECIFIC ANTIGEN): ICD-10-CM

## 2023-01-30 DIAGNOSIS — Z79.899 HIGH RISK MEDICATION USE: ICD-10-CM

## 2023-01-30 DIAGNOSIS — Z11.4 SCREENING FOR HIV WITHOUT PRESENCE OF RISK FACTORS: ICD-10-CM

## 2023-01-30 DIAGNOSIS — I25.84 CORONARY ARTERY DISEASE DUE TO CALCIFIED CORONARY LESION: ICD-10-CM

## 2023-01-30 DIAGNOSIS — Z00.00 WELL ADULT EXAM: ICD-10-CM

## 2023-01-30 DIAGNOSIS — Z13.1 SCREENING FOR DIABETES MELLITUS: ICD-10-CM

## 2023-01-30 DIAGNOSIS — R73.02 IGT (IMPAIRED GLUCOSE TOLERANCE): ICD-10-CM

## 2023-01-30 DIAGNOSIS — I25.10 CORONARY ARTERY DISEASE DUE TO CALCIFIED CORONARY LESION: ICD-10-CM

## 2023-01-30 LAB
ALBUMIN SERPL-MCNC: 4.5 G/DL (ref 3.2–4.6)
ALBUMIN/GLOB SERPL: 1.6 (ref 0.4–1.6)
ALP SERPL-CCNC: 78 U/L (ref 50–136)
ALT SERPL-CCNC: 34 U/L (ref 12–65)
ANION GAP SERPL CALC-SCNC: 5 MMOL/L (ref 2–11)
AST SERPL-CCNC: 12 U/L (ref 15–37)
BILIRUB SERPL-MCNC: 1.2 MG/DL (ref 0.2–1.1)
BUN SERPL-MCNC: 16 MG/DL (ref 8–23)
CALCIUM SERPL-MCNC: 10 MG/DL (ref 8.3–10.4)
CHLORIDE SERPL-SCNC: 108 MMOL/L (ref 101–110)
CHOLEST SERPL-MCNC: 104 MG/DL
CO2 SERPL-SCNC: 30 MMOL/L (ref 21–32)
CREAT SERPL-MCNC: 1.1 MG/DL (ref 0.8–1.5)
GLOBULIN SER CALC-MCNC: 2.9 G/DL (ref 2.8–4.5)
GLUCOSE SERPL-MCNC: 99 MG/DL (ref 65–100)
HDLC SERPL-MCNC: 37 MG/DL (ref 40–60)
HDLC SERPL: 2.8
LDLC SERPL CALC-MCNC: 44 MG/DL
POTASSIUM SERPL-SCNC: 4.7 MMOL/L (ref 3.5–5.1)
PROT SERPL-MCNC: 7.4 G/DL (ref 6.3–8.2)
SODIUM SERPL-SCNC: 143 MMOL/L (ref 133–143)
TRIGL SERPL-MCNC: 115 MG/DL (ref 35–150)
VLDLC SERPL CALC-MCNC: 23 MG/DL (ref 6–23)

## 2023-01-31 LAB
HIV 1+2 AB+HIV1 P24 AG SERPL QL IA: NONREACTIVE
HIV 1/2 RESULT COMMENT: NORMAL

## 2023-02-01 LAB
Lab: NORMAL
Lab: NORMAL
REFERENCE LAB: NORMAL

## 2023-02-03 ENCOUNTER — OFFICE VISIT (OUTPATIENT)
Dept: FAMILY MEDICINE CLINIC | Facility: CLINIC | Age: 63
End: 2023-02-03
Payer: COMMERCIAL

## 2023-02-03 VITALS
OXYGEN SATURATION: 94 % | HEART RATE: 81 BPM | BODY MASS INDEX: 27.4 KG/M2 | HEIGHT: 69 IN | SYSTOLIC BLOOD PRESSURE: 116 MMHG | DIASTOLIC BLOOD PRESSURE: 62 MMHG | WEIGHT: 185 LBS

## 2023-02-03 DIAGNOSIS — Z00.00 WELL ADULT EXAM: Primary | ICD-10-CM

## 2023-02-03 DIAGNOSIS — J42 CHRONIC BRONCHITIS, UNSPECIFIED CHRONIC BRONCHITIS TYPE (HCC): Chronic | ICD-10-CM

## 2023-02-03 DIAGNOSIS — E66.3 OVERWEIGHT (BMI 25.0-29.9): Chronic | ICD-10-CM

## 2023-02-03 DIAGNOSIS — J43.9 PULMONARY EMPHYSEMA, UNSPECIFIED EMPHYSEMA TYPE (HCC): ICD-10-CM

## 2023-02-03 DIAGNOSIS — R71.8 ELEVATED HEMATOCRIT: ICD-10-CM

## 2023-02-03 DIAGNOSIS — Z12.5 SCREENING FOR PROSTATE CANCER: ICD-10-CM

## 2023-02-03 DIAGNOSIS — E78.5 DYSLIPIDEMIA: Chronic | ICD-10-CM

## 2023-02-03 DIAGNOSIS — I25.10 CORONARY ARTERY DISEASE DUE TO CALCIFIED CORONARY LESION: Chronic | ICD-10-CM

## 2023-02-03 DIAGNOSIS — Z72.0 TOBACCO ABUSE: Chronic | ICD-10-CM

## 2023-02-03 DIAGNOSIS — Z79.899 HIGH RISK MEDICATION USE: Chronic | ICD-10-CM

## 2023-02-03 DIAGNOSIS — I25.84 CORONARY ARTERY DISEASE DUE TO CALCIFIED CORONARY LESION: Chronic | ICD-10-CM

## 2023-02-03 DIAGNOSIS — E83.110 HEREDITARY HEMOCHROMATOSIS (HCC): ICD-10-CM

## 2023-02-03 DIAGNOSIS — D58.2 ELEVATED HEMOGLOBIN (HCC): ICD-10-CM

## 2023-02-03 DIAGNOSIS — Z13.1 SCREENING FOR DIABETES MELLITUS: ICD-10-CM

## 2023-02-03 LAB
BASOPHILS # BLD: 0 K/UL (ref 0–0.2)
BASOPHILS NFR BLD: 0 % (ref 0–2)
DIFFERENTIAL METHOD BLD: ABNORMAL
EOSINOPHIL # BLD: 0.1 K/UL (ref 0–0.8)
EOSINOPHIL NFR BLD: 2 % (ref 0.5–7.8)
ERYTHROCYTE [DISTWIDTH] IN BLOOD BY AUTOMATED COUNT: 12.3 % (ref 11.9–14.6)
HCT VFR BLD AUTO: 51 % (ref 41.1–50.3)
HGB BLD-MCNC: 17.4 G/DL (ref 13.6–17.2)
IMM GRANULOCYTES # BLD AUTO: 0 K/UL (ref 0–0.5)
IMM GRANULOCYTES NFR BLD AUTO: 0 % (ref 0–5)
LYMPHOCYTES # BLD: 1.7 K/UL (ref 0.5–4.6)
LYMPHOCYTES NFR BLD: 21 % (ref 13–44)
MCH RBC QN AUTO: 33.8 PG (ref 26.1–32.9)
MCHC RBC AUTO-ENTMCNC: 34.1 G/DL (ref 31.4–35)
MCV RBC AUTO: 99 FL (ref 82–102)
MONOCYTES # BLD: 0.7 K/UL (ref 0.1–1.3)
MONOCYTES NFR BLD: 8 % (ref 4–12)
NEUTS SEG # BLD: 5.6 K/UL (ref 1.7–8.2)
NEUTS SEG NFR BLD: 69 % (ref 43–78)
NRBC # BLD: 0 K/UL (ref 0–0.2)
PLATELET # BLD AUTO: 223 K/UL (ref 150–450)
PMV BLD AUTO: 9.4 FL (ref 9.4–12.3)
RBC # BLD AUTO: 5.15 M/UL (ref 4.23–5.6)
WBC # BLD AUTO: 8.2 K/UL (ref 4.3–11.1)

## 2023-02-03 PROCEDURE — 99396 PREV VISIT EST AGE 40-64: CPT | Performed by: FAMILY MEDICINE

## 2023-02-03 RX ORDER — ALBUTEROL SULFATE 90 UG/1
2 AEROSOL, METERED RESPIRATORY (INHALATION) EVERY 4 HOURS PRN
Qty: 18 G | Refills: 5 | Status: SHIPPED | OUTPATIENT
Start: 2023-02-03

## 2023-02-03 SDOH — ECONOMIC STABILITY: INCOME INSECURITY: HOW HARD IS IT FOR YOU TO PAY FOR THE VERY BASICS LIKE FOOD, HOUSING, MEDICAL CARE, AND HEATING?: NOT VERY HARD

## 2023-02-03 SDOH — ECONOMIC STABILITY: FOOD INSECURITY: WITHIN THE PAST 12 MONTHS, THE FOOD YOU BOUGHT JUST DIDN'T LAST AND YOU DIDN'T HAVE MONEY TO GET MORE.: NEVER TRUE

## 2023-02-03 SDOH — ECONOMIC STABILITY: HOUSING INSECURITY
IN THE LAST 12 MONTHS, WAS THERE A TIME WHEN YOU DID NOT HAVE A STEADY PLACE TO SLEEP OR SLEPT IN A SHELTER (INCLUDING NOW)?: NO

## 2023-02-03 SDOH — ECONOMIC STABILITY: FOOD INSECURITY: WITHIN THE PAST 12 MONTHS, YOU WORRIED THAT YOUR FOOD WOULD RUN OUT BEFORE YOU GOT MONEY TO BUY MORE.: NEVER TRUE

## 2023-02-03 ASSESSMENT — PATIENT HEALTH QUESTIONNAIRE - PHQ9
SUM OF ALL RESPONSES TO PHQ9 QUESTIONS 1 & 2: 0
2. FEELING DOWN, DEPRESSED OR HOPELESS: 0
SUM OF ALL RESPONSES TO PHQ QUESTIONS 1-9: 0
SUM OF ALL RESPONSES TO PHQ QUESTIONS 1-9: 0
1. LITTLE INTEREST OR PLEASURE IN DOING THINGS: 0
SUM OF ALL RESPONSES TO PHQ QUESTIONS 1-9: 0
SUM OF ALL RESPONSES TO PHQ QUESTIONS 1-9: 0

## 2023-02-03 ASSESSMENT — ENCOUNTER SYMPTOMS
EYES NEGATIVE: 1
ALLERGIC/IMMUNOLOGIC NEGATIVE: 1
NAUSEA: 0
CONSTIPATION: 0
CHEST TIGHTNESS: 0
COUGH: 0
DIARRHEA: 0
BACK PAIN: 0
SHORTNESS OF BREATH: 0
VOMITING: 0
WHEEZING: 0
ABDOMINAL PAIN: 0

## 2023-02-03 NOTE — PATIENT INSTRUCTIONS
Call [phone] [facility] Scheduling) and tell them you are calling to schedule your:  Low dose lung cancer screening    ^^^^^    Ask about the [facility] vaccine at your pharmacy    Remember to ask your pharmacist about the relatively new [facility] vaccine for shingles prevention. One in three adults gets shingles in their lifetime. It is a very painful, blistering skin condition which is related to having had chickenpox earlier in life. But the shingles CAN be prevented with this highly effective vaccine! Check with your pharmacist to see if it is covered by your insurance (many insurance companies do). We do not carry the vaccine in our office due to its expense. If your insurance does not cover the vaccine, the cost with a discount coupon like [employer] is about $185 per shot. This includes the vaccine only, and does not cover the administration fee, if any. TWO shots are required to achieve lifetime immunity and are given at least 2 months apart. Those who have had the live vaccine in the past (Zostavax) may still get re-immunized as the new vaccine is significantly better, about 90% effective. The older vaccine was only about 60% effective. If you have had the shingles in the past, you are actually MORE likely to get them again, so it is recommended for you as well. If you think that you cannot get shingles because you have never had the chickenpox, this is highly unlikely. Regardless, the vaccine for shingles is the same as the vaccine for chickenpox, so you will end up protected against both! And if you were to get the chickenpox as an adult, it is generally a much more severe illness. So that's even a better reason to get this vaccine ;)    Please have your pharmacist send us a notification if you get a Shingrix vaccine so we can document it in your medical record.

## 2023-02-03 NOTE — PROGRESS NOTES
Victorina Varela DO                Diplomate of the [employer] of [facility] [place]         [phone]    Ron Nunn is a 65 y.o. male who was seen on 2/3/2023 for   Chief Complaint   Patient presents with    Annual Exam         Assessment & Plan     Diagnosis Orders   1. Well adult exam  Lipid Panel    Comprehensive Metabolic Panel    CBC with Auto Differential    PSA Screening    CBC with Auto Differential    CBC with Auto Differential    2/3/2023      2. Dyslipidemia  Lipid Panel    LDL cholesterol is well controlled HDL remains very low. Smoking cessation encouraged      3. Coronary artery disease due to calcified coronary lesion  Comprehensive Metabolic Panel    Asymptomatic      4. Elevated hemoglobin (HCC)  CBC with Auto Differential    CBC with Auto Differential    CBC with Auto Differential    Patient is encouraged to donate blood at the blood connection as soon as possible. He is also encouraged to quit smoking which probably contributes      5. Elevated hematocrit      Patient is encouraged to donate blood at the blood connection as soon as possible. He is also encouraged to quit smoking which probably contributes      6. Hereditary hemochromatosis ([facility] [address]  CBC with Auto Differential    CBC with Auto Differential    CBC with Auto Differential    Likely the cause for his elevated hemoglobin hematocrit. Donate blood as soon as possible. 7. Pulmonary emphysema, unspecified emphysema type (HCC)  albuterol sulfate HFA (PROVENTIL;VENTOLIN;PROAIR) 108 (90 Base) MCG/ACT inhaler    Stable. Follows with pulmonary medicine      8. Chronic bronchitis, unspecified chronic bronchitis type ([facility] [address]      Stable. Follows with pulmonary medicine      9. Overweight (BMI 25.0-29. 9)      Worsening. Low carb diet advised/reviewed. Information given. 10. Tobacco abuse      Strongly encourage smoking cessation.       11. Screening for diabetes mellitus  Comprehensive Metabolic Panel      12. Screening for prostate cancer  PSA Screening      13. High risk medication use  Comprehensive Metabolic Panel          Follow-up and Dispositions    Return in about 1 year (around 2/4/2024) for CPE ANA MARIA, LABS TODAY. RECENT LABS/TESTS TO REVIEW and DISCUSS    No results found for this visit on 02/03/23.     PHQ-9  2/3/2023 11/28/2022 5/6/2022   Little interest or pleasure in doing things 0 0 0   Little interest or pleasure in doing things - - 0   Feeling down, depressed, or hopeless 0 0 0   Trouble falling or staying asleep, or sleeping too much - - -   Feeling tired or having little energy - - -   Poor appetite, weight loss, or overeating - - -   Feeling bad about yourself - or that you are a failure or have let yourself or your family down - - -   Trouble concentrating on things such as school, work, reading, or watching TV - - -   Moving or speaking so slowly that other people could have noticed; or the opposite being so fidgety that others notice - - -   Thoughts of being better off dead, or hurting yourself in some way - - -   PHQ-2 Score 0 0 0   Total Score PHQ 2 - - 0   PHQ-9 Total Score 0 0 0   PHQ 9 Score - - -   How difficult have these problems made it for you to do your work, take care of your home and get along with others - - -       Component      Latest Ref Rng & Units 1/30/2023 7/27/2022 1/31/2022           8:05 AM 11:22 AM 10:12 AM   CHOLESTEROL, TOTAL, [id number]      <200 MG/ 98 154   Triglycerides      35 - 150 MG/ 142 132   HDL Cholesterol      40 - 60 MG/DL 37 (L) 37 (L) 26 (L)   LDL Calculated      <100 MG/DL 44 32.6 104 (H)   VLDL Cholesterol Calculated      6.0 - 23.0 MG/DL 23 28.4 (H)    Chol/HDL Ratio       2.8 2.6    VLDL      5 - 40 mg/dL   24     Component      Latest Ref Rng & Units 11/28/2022 1/31/2022 5/8/2020           2:06 PM 10:12 AM 10:24 AM   WBC      4.3 - 11.1 K/uL 10.1 7.9 9.9   RBC 4.23 - 5.6 M/uL 5.45 5.32 5.43   Hemoglobin Quant      13.6 - 17.2 g/dL 18.2 (H) 16.5 17.3   Hematocrit      41.1 - 50.3 % 53.3 (H) 49.8 48.6   MCV      82 - 102 FL 97.8 94 90   MCH      26.1 - 32.9 PG 33.4 (H) 31.0 31.9   MCHC      31.4 - 35.0 g/dL 34.1 33.1 35.6   RDW      11.9 - 14.6 % 12.3 12.7 12.6   Platelet Count      722 - 450 K/uL 220 278 272     Subjective    HPI:     This is a 78-year-old male patient here today for annual follow-up on several medical conditions and complete physical examination. The patient had labs in advance of today's visit, the results of which were discussed with him/her at length. After his last visit here in November, because of elevated hemoglobin and hematocrit, advised the patient to donate blood as soon as possible and then recheck CBC. He says that the blood connection would not allow him to donate blood and it may have something to do the fact that he had a standing order in the past to donate blood every 2 weeks. I am not certain why he needs to have special permission to donate blood now. I will contact the blood connection. At the time I thought it had something to do with his headaches. But his headaches have since disappeared and the patient now thinks they were related to eating bananas. The patient continues to smoke heavily. I noted that his pulmonologist, Dr. Natali Avendaño had ordered a low-dose CT lung cancer screening at his visit in November. The patient knows nothing of this and it does not appear that he was ever contacted to schedule it. I gave him the number for central scheduling today. Reviewed and updated this visit by provider:           Review of Systems   Constitutional:  Negative for fatigue and fever. HENT: Negative. Eyes: Negative. Respiratory:  Negative for cough, chest tightness, shortness of breath and wheezing. Cardiovascular:  Negative for chest pain and leg swelling.    Gastrointestinal:  Negative for abdominal pain, constipation, diarrhea, nausea and vomiting. Endocrine: Negative. Genitourinary:  Negative for difficulty urinating, dysuria and frequency. Musculoskeletal:  Negative for arthralgias, back pain and neck pain. Skin:  Negative for rash. Allergic/Immunologic: Negative. Neurological:  Negative for dizziness and light-headedness. Psychiatric/Behavioral:  Negative for dysphoric mood. The patient is not nervous/anxious. All other systems reviewed and are negative. Objective    /62 (Site: Left Upper Arm, Position: Sitting, Cuff Size: Large Adult)   Pulse 81   Ht 5' 9\" (1.753 m)   Wt 185 lb (83.9 kg)   SpO2 94%   BMI 27.32 kg/m²     Physical Exam  Vitals reviewed. Constitutional:       General: He is not in acute distress. Appearance: Normal appearance. He is well-developed and well-groomed. HENT:      Head: Normocephalic and atraumatic. Right Ear: Tympanic membrane, ear canal and external ear normal.      Left Ear: Tympanic membrane, ear canal and external ear normal.      Mouth/Throat:      Mouth: Mucous membranes are moist.   Eyes:      General: Lids are normal.      Extraocular Movements: Extraocular movements intact. Conjunctiva/sclera: Conjunctivae normal.      Pupils: Pupils are equal, round, and reactive to light. Neck:      Vascular: No carotid bruit. Cardiovascular:      Rate and Rhythm: Normal rate and regular rhythm. Heart sounds: Normal heart sounds. Pulmonary:      Effort: Pulmonary effort is normal.      Breath sounds: Decreased air movement present. Abdominal:      General: Bowel sounds are normal.      Palpations: Abdomen is soft. There is no hepatomegaly, splenomegaly or mass. Tenderness: There is no abdominal tenderness. Hernia: No hernia is present. There is no hernia in the left inguinal area or right inguinal area.    Genitourinary:     Penis: Normal.       Testes: Normal.      Epididymis:      Right: Normal.      Left: Normal. Musculoskeletal:      Cervical back: Neck supple. No rigidity or tenderness. Lymphadenopathy:      Cervical: No cervical adenopathy. Skin:     General: Skin is warm and dry. Neurological:      General: No focal deficit present. Mental Status: He is alert and oriented to person, place, and time. Cranial Nerves: Cranial nerves 2-12 are intact. Sensory: Sensation is intact. Motor: Motor function is intact. Deep Tendon Reflexes: Reflexes are normal and symmetric. Reflex Scores:       Brachioradialis reflexes are 2+ on the right side and 2+ on the left side. Patellar reflexes are 2+ on the right side and 2+ on the left side. Psychiatric:         Attention and Perception: Attention and perception normal.         Mood and Affect: Mood and affect normal.         Speech: Speech normal.         Behavior: Behavior normal. Behavior is cooperative. Thought Content: Thought content normal.         Cognition and Memory: Cognition and memory normal.         Judgment: Judgment normal.       On this date 02/03/2023 I have spent 30 minutes reviewing previous notes, lab/imaging results and face to face with the patient discussing the diagnoses and importance of compliance with the treatment plan, as well as documenting on the day of the visit.         DO Sammi K[facility] [place]

## 2023-03-05 PROBLEM — Z00.00 WELL ADULT EXAM: Chronic | Status: RESOLVED | Noted: 2022-02-13 | Resolved: 2023-03-05

## 2023-03-05 PROBLEM — Z13.1 SCREENING FOR DIABETES MELLITUS: Chronic | Status: RESOLVED | Noted: 2020-02-11 | Resolved: 2023-03-05

## 2023-03-05 PROBLEM — Z12.5 SCREENING FOR PROSTATE CANCER: Chronic | Status: RESOLVED | Noted: 2023-02-03 | Resolved: 2023-03-05

## 2023-05-24 ENCOUNTER — OFFICE VISIT (OUTPATIENT)
Dept: PULMONOLOGY | Age: 63
End: 2023-05-24
Payer: COMMERCIAL

## 2023-05-24 VITALS
BODY MASS INDEX: 23.11 KG/M2 | HEART RATE: 72 BPM | OXYGEN SATURATION: 93 % | DIASTOLIC BLOOD PRESSURE: 60 MMHG | SYSTOLIC BLOOD PRESSURE: 110 MMHG | TEMPERATURE: 97.1 F | HEIGHT: 69 IN | WEIGHT: 156 LBS

## 2023-05-24 DIAGNOSIS — F17.210 CIGARETTE NICOTINE DEPENDENCE, UNCOMPLICATED: ICD-10-CM

## 2023-05-24 DIAGNOSIS — Z87.891 PERSONAL HISTORY OF TOBACCO USE: Primary | ICD-10-CM

## 2023-05-24 DIAGNOSIS — J44.9 CHRONIC OBSTRUCTIVE PULMONARY DISEASE, UNSPECIFIED COPD TYPE (HCC): ICD-10-CM

## 2023-05-24 PROCEDURE — G0296 VISIT TO DETERM LDCT ELIG: HCPCS | Performed by: INTERNAL MEDICINE

## 2023-05-24 PROCEDURE — 99214 OFFICE O/P EST MOD 30 MIN: CPT | Performed by: INTERNAL MEDICINE

## 2023-05-24 RX ORDER — VARENICLINE TARTRATE 1 MG/1
1 TABLET, FILM COATED ORAL 2 TIMES DAILY
Qty: 60 TABLET | Refills: 1 | Status: SHIPPED | OUTPATIENT
Start: 2023-05-24 | End: 2023-08-22

## 2023-05-24 RX ORDER — VARENICLINE TARTRATE 0.5 MG/1
TABLET, FILM COATED ORAL
Qty: 1 BOX | Refills: 0 | Status: SHIPPED | OUTPATIENT
Start: 2023-05-24

## 2023-05-24 RX ORDER — FLUTICASONE FUROATE, UMECLIDINIUM BROMIDE AND VILANTEROL TRIFENATATE 100; 62.5; 25 UG/1; UG/1; UG/1
1 POWDER RESPIRATORY (INHALATION) DAILY
Qty: 1 EACH | Refills: 11 | Status: SHIPPED | OUTPATIENT
Start: 2023-05-24

## 2023-05-24 NOTE — PROGRESS NOTES
[facility]: FOLLOW-UP Patient Office Visit Note  [address] [place] [facility], [address] [facility]  [phone]    Patient Name:  Nathaly Duarte  YOB: 1960            Date of Service:  5/24/2023    Chief Complaint   Patient presents with    COPD       History of Present Illness:  Elena Preseamon is a 61-year-old white male with history of COPD last seen nov 2022. On his evaluation in June PFTs revealed severe airway obstruction. The patient was placed on Trelegy and has noted improvement on that. He currently is doing well with no increase in sob, cough, or chest pain. No wheezing. He continues to smoke 1.5 ppd of cigarettes. The pt last had chest ct march 2022- This was for ca scoring and it did show severe emphysema but no nodules or masses. Past Medical History:   Diagnosis Date    Allergic rhinitis     Chronic obstructive pulmonary disease (HCC)     inhaler prn, smoker    Cough due to bronchospasm 2/11/2020    chest discomfort (ribs) with cough    Elevated blood pressure reading 2/13/2022    Frequent BP checks and report 2 weeks     Pneumonia     twice. Last episode was 2016       Patient Active Problem List   Diagnosis    Overweight (BMI 25.0-29. 9)    Colonoscopy refused    Chronic bronchitis (HCC)    Smoking greater than 30 pack years    Hereditary hemochromatosis ([address]    Distorted taste    Dupuytren's contracture of right hand    COVID-19 vaccine series completed    Refused influenza vaccine    Tobacco abuse    Pulmonary emphysema ([address]    History of pneumonia    Family history of premature CAD    Dyslipidemia    Abnormal cardiovascular function study    Coronary artery disease due to calcified coronary lesion    High risk medication use    Chronic cluster headache, not intractable    Elevated hemoglobin (HCC)    Elevated hematocrit         Past Surgical History:   Procedure Laterality Date    HAND SURGERY Right 02/2022    [address] [facility]

## 2023-06-02 PROBLEM — E66.3 OVERWEIGHT (BMI 25.0-29.9): Chronic | Status: RESOLVED | Noted: 2018-11-27 | Resolved: 2023-06-02

## 2023-07-06 ENCOUNTER — HOSPITAL ENCOUNTER (OUTPATIENT)
Dept: CT IMAGING | Age: 63
Discharge: HOME | End: 2023-07-06
Attending: INTERNAL MEDICINE
Payer: COMMERCIAL

## 2023-07-06 DIAGNOSIS — Z87.891 PERSONAL HISTORY OF TOBACCO USE: ICD-10-CM

## 2023-07-06 PROCEDURE — 71271 CT THORAX LUNG CANCER SCR C-: CPT

## 2023-07-07 ENCOUNTER — TELEPHONE (OUTPATIENT)
Dept: PULMONOLOGY | Age: 63
End: 2023-07-07

## 2023-07-07 DIAGNOSIS — R91.8 PULMONARY NODULES: Primary | ICD-10-CM

## 2023-07-07 NOTE — TELEPHONE ENCOUNTER
----- Message from Wilmer Clemente MD sent at 7/6/2023  5:08 PM EDT -----  Suspicious nodules-please get pet and let pt know    I have pushed recent CT chest to [facility] protocol and have left patient a message to return call.

## 2023-07-07 NOTE — TELEPHONE ENCOUNTER
I have spoken with patient. He is aware of results of CT chest and is agreeable to PET scan. I have explained this image and how it differs from CT and he agrees. PET scan is scheduled on 7/14 at 0800. He will call in afternoon of 7/14 for results.

## 2023-07-14 ENCOUNTER — HOSPITAL ENCOUNTER (OUTPATIENT)
Dept: PET IMAGING | Age: 63
Discharge: HOME | End: 2023-07-14
Payer: COMMERCIAL

## 2023-07-14 ENCOUNTER — TELEPHONE (OUTPATIENT)
Dept: PULMONOLOGY | Age: 63
End: 2023-07-14

## 2023-07-14 DIAGNOSIS — R91.8 PULMONARY NODULES: Primary | ICD-10-CM

## 2023-07-14 DIAGNOSIS — R91.8 PULMONARY NODULES: ICD-10-CM

## 2023-07-14 LAB
GLUCOSE BLD STRIP.AUTO-MCNC: 115 MG/DL (ref 65–100)
SERVICE CMNT-IMP: ABNORMAL

## 2023-07-14 PROCEDURE — 6360000004 HC RX CONTRAST MEDICATION: Performed by: INTERNAL MEDICINE

## 2023-07-14 PROCEDURE — A9552 F18 FDG: HCPCS | Performed by: INTERNAL MEDICINE

## 2023-07-14 PROCEDURE — 82962 GLUCOSE BLOOD TEST: CPT

## 2023-07-14 PROCEDURE — 2580000003 HC RX 258: Performed by: INTERNAL MEDICINE

## 2023-07-14 PROCEDURE — 3430000000 HC RX DIAGNOSTIC RADIOPHARMACEUTICAL: Performed by: INTERNAL MEDICINE

## 2023-07-14 PROCEDURE — 78815 PET IMAGE W/CT SKULL-THIGH: CPT

## 2023-07-14 RX ORDER — SODIUM CHLORIDE 0.9 % (FLUSH) 0.9 %
20 SYRINGE (ML) INJECTION AS NEEDED
Status: DISCONTINUED | OUTPATIENT
Start: 2023-07-14 | End: 2023-07-18 | Stop reason: HOSPADM

## 2023-07-14 RX ORDER — FLUDEOXYGLUCOSE F 18 200 MCI/ML
12.35 INJECTION, SOLUTION INTRAVENOUS
Status: COMPLETED | OUTPATIENT
Start: 2023-07-14 | End: 2023-07-14

## 2023-07-14 RX ADMIN — SODIUM CHLORIDE, PRESERVATIVE FREE 20 ML: 5 INJECTION INTRAVENOUS at 07:29

## 2023-07-14 RX ADMIN — FLUDEOXYGLUCOSE F 18 12.35 MILLICURIE: 200 INJECTION, SOLUTION INTRAVENOUS at 08:29

## 2023-07-14 RX ADMIN — DIATRIZOATE MEGLUMINE AND DIATRIZOATE SODIUM 10 ML: 660; 100 LIQUID ORAL; RECTAL at 08:29

## 2023-07-17 NOTE — TELEPHONE ENCOUNTER
MD Hoang Dahl Dr. wants follow up ct in 3 months- bx too high of a risk- please let him know -also get alpha one antitrypsin level

## 2023-07-17 NOTE — TELEPHONE ENCOUNTER
I have spoken with patient. He is aware of communization per Dr Gloria Gale regarding possible BX of pulmonary nodules. He allow me to schedule him for 3 month repeat CT chest on 10/18 and appointment with Dr Aria Childress on 10/25. He will call for any additional questions prior to this appointment. Order for alpha 1 antitrypsin ordered.

## 2023-07-26 DIAGNOSIS — E78.5 DYSLIPIDEMIA: ICD-10-CM

## 2023-07-26 RX ORDER — ATORVASTATIN CALCIUM 20 MG/1
20 TABLET, FILM COATED ORAL DAILY
Qty: 90 TABLET | Refills: 3 | OUTPATIENT
Start: 2023-07-26

## 2023-08-01 ENCOUNTER — OFFICE VISIT (OUTPATIENT)
Age: 63
End: 2023-08-01
Payer: COMMERCIAL

## 2023-08-01 VITALS
BODY MASS INDEX: 25.92 KG/M2 | DIASTOLIC BLOOD PRESSURE: 80 MMHG | SYSTOLIC BLOOD PRESSURE: 128 MMHG | WEIGHT: 175 LBS | HEART RATE: 68 BPM | HEIGHT: 69 IN

## 2023-08-01 DIAGNOSIS — I25.84 CORONARY ARTERY DISEASE DUE TO CALCIFIED CORONARY LESION: Primary | ICD-10-CM

## 2023-08-01 DIAGNOSIS — I25.10 CORONARY ARTERY DISEASE DUE TO CALCIFIED CORONARY LESION: Primary | ICD-10-CM

## 2023-08-01 DIAGNOSIS — Z72.0 TOBACCO ABUSE: Chronic | ICD-10-CM

## 2023-08-01 DIAGNOSIS — E78.5 DYSLIPIDEMIA: ICD-10-CM

## 2023-08-01 PROCEDURE — 93000 ELECTROCARDIOGRAM COMPLETE: CPT | Performed by: INTERNAL MEDICINE

## 2023-08-01 PROCEDURE — 99214 OFFICE O/P EST MOD 30 MIN: CPT | Performed by: INTERNAL MEDICINE

## 2023-08-01 RX ORDER — ATORVASTATIN CALCIUM 20 MG/1
20 TABLET, FILM COATED ORAL DAILY
Qty: 90 TABLET | Refills: 3 | Status: SHIPPED | OUTPATIENT
Start: 2023-08-01

## 2023-08-01 ASSESSMENT — ENCOUNTER SYMPTOMS
EYES NEGATIVE: 1
CHEST TIGHTNESS: 0
EYE PAIN: 0
ABDOMINAL PAIN: 0
ALLERGIC/IMMUNOLOGIC NEGATIVE: 1
GASTROINTESTINAL NEGATIVE: 1
RESPIRATORY NEGATIVE: 1
BACK PAIN: 0
PHOTOPHOBIA: 0
SHORTNESS OF BREATH: 0

## 2023-08-01 NOTE — PROGRESS NOTES
[address], [facility]  PHONE: [phone]      23    NAME:  Nirali Huerta  : 1960  MRN: [id number]         SUBJECTIVE:   Nirali Huerta is a 59 y.o. male seen for follow up of:      Chief Complaint   Patient presents with    6 Month Follow-Up    Coronary Artery Disease        Cardiac Hx (Reviewed and summarized by me):  1) CAD  CT calcium score - 465 (80%ile for age)  NM stress - normal perfusion, severe SOB could not get target HR  2) FHx - youger brother  at 48 of an AMI  3) COPD  4) Smoker - 1 ppd to 1.5 ppd  5) HLD              22 - HDL 26, , Trig 132              22 - HDL 37, LDL 32.6, Trig 142   23 - HDL 37, LDL 44, Trig 115    ECG: NSR with normal axis nonspecific t wave changes most prominently in lateral leads. HPI:  Doing well. Denies chest pain palpitations orthopnea PND lower extremity edema. Last lipid panel was reviewed. Blood pressures controlled. He stopped using cigarettes and is started vaping in an effort to wean himself off nicotine. Past Medical History, Past Surgical History, Family history, Social History, and Medications were all reviewed with the patient today and updated as necessary.        Current Outpatient Medications:     atorvastatin (LIPITOR) 20 MG tablet, Take 1 tablet by mouth daily, Disp: 90 tablet, Rfl: 3    fluticasone-umeclidin-vilant (TRELEGY ELLIPTA) 100-62.5-25 MCG/ACT AEPB inhaler, Inhale 1 puff into the lungs daily, Disp: 1 each, Rfl: 11    albuterol sulfate HFA (PROVENTIL;VENTOLIN;PROAIR) 108 (90 Base) MCG/ACT inhaler, Inhale 2 puffs into the lungs every 4 hours as needed for Wheezing or Shortness of Breath, Disp: 18 g, Rfl: 5    aspirin EC 81 MG EC tablet, Take 1 tablet by mouth daily, Disp: , Rfl:     fluticasone-umeclidin-vilant (TRELEGY ELLIPTA) 100-62.5-25 MCG/INH AEPB, Inhale 1 puff into the lungs daily, Disp: 1 each, Rfl: 11    Multiple Vitamin

## 2023-10-18 ENCOUNTER — HOSPITAL ENCOUNTER (OUTPATIENT)
Dept: CT IMAGING | Age: 63
Discharge: HOME | End: 2023-10-21
Attending: INTERNAL MEDICINE
Payer: COMMERCIAL

## 2023-10-18 DIAGNOSIS — R91.8 PULMONARY NODULES: ICD-10-CM

## 2023-10-18 PROCEDURE — 71250 CT THORAX DX C-: CPT

## 2023-10-26 ENCOUNTER — OFFICE VISIT (OUTPATIENT)
Dept: PULMONOLOGY | Age: 63
End: 2023-10-26
Payer: COMMERCIAL

## 2023-10-26 VITALS
DIASTOLIC BLOOD PRESSURE: 84 MMHG | RESPIRATION RATE: 14 BRPM | WEIGHT: 183 LBS | HEIGHT: 72 IN | BODY MASS INDEX: 24.79 KG/M2 | TEMPERATURE: 97.4 F | SYSTOLIC BLOOD PRESSURE: 124 MMHG | HEART RATE: 56 BPM | OXYGEN SATURATION: 94 %

## 2023-10-26 DIAGNOSIS — R91.8 PULMONARY NODULES: Primary | ICD-10-CM

## 2023-10-26 DIAGNOSIS — F17.210 NICOTINE DEPENDENCE, CIGARETTES, UNCOMPLICATED: ICD-10-CM

## 2023-10-26 DIAGNOSIS — Z72.89 CURRENT EVERY DAY VAPING: ICD-10-CM

## 2023-10-26 DIAGNOSIS — J44.9 CHRONIC OBSTRUCTIVE PULMONARY DISEASE, UNSPECIFIED COPD TYPE (HCC): ICD-10-CM

## 2023-10-26 PROCEDURE — 99214 OFFICE O/P EST MOD 30 MIN: CPT | Performed by: INTERNAL MEDICINE

## 2023-10-26 NOTE — PROGRESS NOTES
[facility]: FOLLOW-UP Patient Office Visit Note  [place] [address]. [facility], [address]  [facility]  [phone]    Patient Name:  Stu Duvall  YOB: 1960            Date of Service:  10/26/2023    Chief Complaint   Patient presents with    Follow-up    COPD       History of Present Illness: This is a 78-year-old male with history of COPD with severe obstruction noted on pulmonary function testing. Patient has been placed on Trelegy and has noted improvement in his chronic shortness of breath. He denies wheezing and cough occurs minimally. No chest pain. Patient has stopped smoking cigarettes but now is vaping. After his last visit a chest CT scan was ordered and showed suspicious pulmonary nodules as well as emphysema. A PET scan was done and this demonstrated mild metabolic activity in the left upper lobe and left lower lobe nodules but no evidence of denise or distant metastatic disease. Biopsy was considered however clear airways leading to these lesions was not seen and with the patient's emphysema it was felt that any type of biopsy would be high risk. Recent repeat chest CT scan reveals that the lesions appear slightly smaller. Currently the patient notes improvement in his breathing since being on Trelegy. Also since stopping smoking cigarettes. He has had no chest pain. He does have a cough but it is fairly minimal.    Past Medical History:   Diagnosis Date    Allergic rhinitis     Chronic obstructive pulmonary disease (HCC)     inhaler prn, smoker    Cough due to bronchospasm 2/11/2020    chest discomfort (ribs) with cough    Elevated blood pressure reading 2/13/2022    Frequent BP checks and report 2 weeks     Overweight (BMI 25.0-29.9) 11/27/2018    Low carb diet advised/reviewed. Information given. Pneumonia     twice.   Last episode was 2016       Patient Active Problem List   Diagnosis    Colonoscopy refused    Chronic bronchitis

## 2023-10-31 DIAGNOSIS — E78.5 DYSLIPIDEMIA: ICD-10-CM

## 2023-11-03 RX ORDER — ATORVASTATIN CALCIUM 20 MG/1
20 TABLET, FILM COATED ORAL DAILY
Qty: 90 TABLET | Refills: 3 | Status: SHIPPED | OUTPATIENT
Start: 2023-11-03

## 2024-02-14 ENCOUNTER — OFFICE VISIT (OUTPATIENT)
Age: 64
End: 2024-02-14
Payer: COMMERCIAL

## 2024-02-14 VITALS
HEART RATE: 68 BPM | WEIGHT: 196.8 LBS | SYSTOLIC BLOOD PRESSURE: 132 MMHG | DIASTOLIC BLOOD PRESSURE: 72 MMHG | HEIGHT: 69 IN | BODY MASS INDEX: 29.15 KG/M2

## 2024-02-14 DIAGNOSIS — I25.10 CORONARY ARTERY DISEASE DUE TO CALCIFIED CORONARY LESION: Primary | Chronic | ICD-10-CM

## 2024-02-14 DIAGNOSIS — E78.2 MIXED HYPERLIPIDEMIA: ICD-10-CM

## 2024-02-14 DIAGNOSIS — Z72.0 TOBACCO ABUSE: Chronic | ICD-10-CM

## 2024-02-14 DIAGNOSIS — I25.84 CORONARY ARTERY DISEASE DUE TO CALCIFIED CORONARY LESION: Primary | Chronic | ICD-10-CM

## 2024-02-14 PROCEDURE — 99214 OFFICE O/P EST MOD 30 MIN: CPT | Performed by: INTERNAL MEDICINE

## 2024-02-14 NOTE — PROGRESS NOTES
[facility] CARDIOLOGY  [address] [place] [address], [address]  [place] [address]  PHONE: [phone]      24    NAME:  Robson Cox  : 1960  MRN: [id number]         SUBJECTIVE:   Robson Cox is a 63 y.o. male seen for follow up of:      Chief Complaint   Patient presents with    Coronary Artery Disease        Cardiac Hx (Reviewed and summarized by me):  1) CAD  CT calcium score - 465 (80%ile for age)  NM stress - normal perfusion, severe SOB could not get target HR  2) FHx - youger brother  at 50 of an AMI  3) COPD  4) Smoker - 1 ppd to 1.5 ppd  5) HLD              22 - HDL 26, , Trig 132              22 - HDL 37, LDL 32.6, Trig 142              23 - HDL 37, LDL 44, Trig 115      HPI:  Continues to smoke.  Denies chest pain shortness of breath.  Has had some intermittent lower extremity edema however it resolved and currently has none on exam.  He has some chronic shortness of breath.    Past Medical History, Past Surgical History, Family history, Social History, and Medications were all reviewed with the patient today and updated as necessary.       Current Outpatient Medications:     atorvastatin (LIPITOR) 20 MG tablet, Take 1 tablet by mouth daily, Disp: 90 tablet, Rfl: 3    fluticasone-umeclidin-vilant (TRELEGY ELLIPTA) 100-62.5-25 MCG/ACT AEPB inhaler, Inhale 1 puff into the lungs daily, Disp: 1 each, Rfl: 11    albuterol sulfate HFA (PROVENTIL;VENTOLIN;PROAIR) 108 (90 Base) MCG/ACT inhaler, Inhale 2 puffs into the lungs every 4 hours as needed for Wheezing or Shortness of Breath, Disp: 18 g, Rfl: 5    aspirin EC 81 MG EC tablet, Take 1 tablet by mouth daily, Disp: , Rfl:     fluticasone-umeclidin-vilant (TRELEGY ELLIPTA) 100-62.5-25 MCG/INH AEPB, Inhale 1 puff into the lungs daily, Disp: 1 each, Rfl: 11    Multiple Vitamin (MULTIVITAMIN PO), Take 1 tablet by mouth daily, Disp: , Rfl:     ibuprofen (ADVIL;MOTRIN) 200 MG tablet, Take 2 tablets by mouth as 
49.5

## 2024-02-21 DIAGNOSIS — E78.2 MIXED HYPERLIPIDEMIA: ICD-10-CM

## 2024-02-21 LAB
CHOLEST SERPL-MCNC: 126 MG/DL
HDLC SERPL-MCNC: 41 MG/DL (ref 40–60)
HDLC SERPL: 3.1
LDLC SERPL CALC-MCNC: 65 MG/DL
TRIGL SERPL-MCNC: 100 MG/DL (ref 35–150)
VLDLC SERPL CALC-MCNC: 20 MG/DL (ref 6–23)

## 2024-04-17 ENCOUNTER — OFFICE VISIT (OUTPATIENT)
Dept: FAMILY MEDICINE CLINIC | Facility: CLINIC | Age: 64
End: 2024-04-17
Payer: COMMERCIAL

## 2024-04-17 VITALS
BODY MASS INDEX: 26.06 KG/M2 | DIASTOLIC BLOOD PRESSURE: 86 MMHG | HEIGHT: 72 IN | HEART RATE: 57 BPM | WEIGHT: 192.4 LBS | SYSTOLIC BLOOD PRESSURE: 134 MMHG | OXYGEN SATURATION: 93 %

## 2024-04-17 DIAGNOSIS — J43.9 PULMONARY EMPHYSEMA, UNSPECIFIED EMPHYSEMA TYPE (HCC): Primary | ICD-10-CM

## 2024-04-17 DIAGNOSIS — R73.03 PRE-DIABETES: ICD-10-CM

## 2024-04-17 DIAGNOSIS — D75.1 SECONDARY ERYTHROCYTOSIS: ICD-10-CM

## 2024-04-17 DIAGNOSIS — E78.5 DYSLIPIDEMIA: ICD-10-CM

## 2024-04-17 DIAGNOSIS — Z12.5 SCREENING FOR PROSTATE CANCER: ICD-10-CM

## 2024-04-17 DIAGNOSIS — D58.2 ELEVATED HEMOGLOBIN (HCC): ICD-10-CM

## 2024-04-17 LAB
ALBUMIN SERPL-MCNC: 4.6 G/DL (ref 3.2–4.6)
ALBUMIN/GLOB SERPL: 1.6 (ref 0.4–1.6)
ALP SERPL-CCNC: 73 U/L (ref 50–136)
ALT SERPL-CCNC: 38 U/L (ref 12–65)
ANION GAP SERPL CALC-SCNC: 4 MMOL/L (ref 2–11)
AST SERPL-CCNC: 15 U/L (ref 15–37)
BASOPHILS # BLD: 0 K/UL (ref 0–0.2)
BASOPHILS NFR BLD: 0 % (ref 0–2)
BILIRUB SERPL-MCNC: 0.7 MG/DL (ref 0.2–1.1)
BUN SERPL-MCNC: 14 MG/DL (ref 8–23)
CALCIUM SERPL-MCNC: 9.8 MG/DL (ref 8.3–10.4)
CHLORIDE SERPL-SCNC: 106 MMOL/L (ref 103–113)
CHOLEST SERPL-MCNC: 118 MG/DL
CO2 SERPL-SCNC: 31 MMOL/L (ref 21–32)
CREAT SERPL-MCNC: 1.2 MG/DL (ref 0.8–1.5)
DIFFERENTIAL METHOD BLD: ABNORMAL
EOSINOPHIL # BLD: 0.2 K/UL (ref 0–0.8)
EOSINOPHIL NFR BLD: 2 % (ref 0.5–7.8)
ERYTHROCYTE [DISTWIDTH] IN BLOOD BY AUTOMATED COUNT: 12.7 % (ref 11.9–14.6)
GLOBULIN SER CALC-MCNC: 2.8 G/DL (ref 2.8–4.5)
GLUCOSE SERPL-MCNC: 85 MG/DL (ref 65–100)
HCT VFR BLD AUTO: 51.9 % (ref 41.1–50.3)
HDLC SERPL-MCNC: 35 MG/DL (ref 40–60)
HDLC SERPL: 3.4
HGB BLD-MCNC: 16.7 G/DL (ref 13.6–17.2)
IMM GRANULOCYTES # BLD AUTO: 0.1 K/UL (ref 0–0.5)
IMM GRANULOCYTES NFR BLD AUTO: 1 % (ref 0–5)
IRON SERPL-MCNC: 77 UG/DL (ref 35–150)
LDLC SERPL CALC-MCNC: 49.6 MG/DL
LYMPHOCYTES # BLD: 1.7 K/UL (ref 0.5–4.6)
LYMPHOCYTES NFR BLD: 21 % (ref 13–44)
MCH RBC QN AUTO: 31.9 PG (ref 26.1–32.9)
MCHC RBC AUTO-ENTMCNC: 32.2 G/DL (ref 31.4–35)
MCV RBC AUTO: 99.2 FL (ref 82–102)
MONOCYTES # BLD: 0.5 K/UL (ref 0.1–1.3)
MONOCYTES NFR BLD: 7 % (ref 4–12)
NEUTS SEG # BLD: 5.4 K/UL (ref 1.7–8.2)
NEUTS SEG NFR BLD: 69 % (ref 43–78)
NRBC # BLD: 0 K/UL (ref 0–0.2)
PLATELET # BLD AUTO: 221 K/UL (ref 150–450)
PMV BLD AUTO: 9.4 FL (ref 9.4–12.3)
POTASSIUM SERPL-SCNC: 4.5 MMOL/L (ref 3.5–5.1)
PROT SERPL-MCNC: 7.4 G/DL (ref 6.3–8.2)
PSA SERPL-MCNC: 0.4 NG/ML
RBC # BLD AUTO: 5.23 M/UL (ref 4.23–5.6)
SODIUM SERPL-SCNC: 141 MMOL/L (ref 136–146)
TRIGL SERPL-MCNC: 167 MG/DL (ref 35–150)
VLDLC SERPL CALC-MCNC: 33.4 MG/DL (ref 6–23)
WBC # BLD AUTO: 7.8 K/UL (ref 4.3–11.1)

## 2024-04-17 PROCEDURE — 99214 OFFICE O/P EST MOD 30 MIN: CPT | Performed by: FAMILY MEDICINE

## 2024-04-17 RX ORDER — ALBUTEROL SULFATE 90 UG/1
2 AEROSOL, METERED RESPIRATORY (INHALATION) EVERY 4 HOURS PRN
Qty: 18 G | Refills: 5 | Status: SHIPPED | OUTPATIENT
Start: 2024-04-17

## 2024-04-17 SDOH — ECONOMIC STABILITY: INCOME INSECURITY: HOW HARD IS IT FOR YOU TO PAY FOR THE VERY BASICS LIKE FOOD, HOUSING, MEDICAL CARE, AND HEATING?: NOT HARD AT ALL

## 2024-04-17 SDOH — ECONOMIC STABILITY: FOOD INSECURITY: WITHIN THE PAST 12 MONTHS, YOU WORRIED THAT YOUR FOOD WOULD RUN OUT BEFORE YOU GOT MONEY TO BUY MORE.: NEVER TRUE

## 2024-04-17 SDOH — ECONOMIC STABILITY: FOOD INSECURITY: WITHIN THE PAST 12 MONTHS, THE FOOD YOU BOUGHT JUST DIDN'T LAST AND YOU DIDN'T HAVE MONEY TO GET MORE.: NEVER TRUE

## 2024-04-17 ASSESSMENT — PATIENT HEALTH QUESTIONNAIRE - PHQ9
SUM OF ALL RESPONSES TO PHQ9 QUESTIONS 1 & 2: 0
2. FEELING DOWN, DEPRESSED OR HOPELESS: NOT AT ALL
SUM OF ALL RESPONSES TO PHQ QUESTIONS 1-9: 0
1. LITTLE INTEREST OR PLEASURE IN DOING THINGS: NOT AT ALL
SUM OF ALL RESPONSES TO PHQ QUESTIONS 1-9: 0

## 2024-04-17 ASSESSMENT — ENCOUNTER SYMPTOMS: COUGH: 1

## 2024-04-17 NOTE — PROGRESS NOTES
PROGRESS NOTE    SUBJECTIVE:   Robson Cox is a 63 y.o. male seen for a follow up visit regarding   Chief Complaint   Patient presents with    Establish Care     Here to establish care with provider; former patient of Dr. Merida:  Previously cared for by Dr Dutta, very pleasant 63-year-old male with a history of smoking and advanced emphysema.  He has been engaged in the screening CT program.  He sees pulmonary medicine.  Other issues include hypertension, prediabetes, chart has labeled him as hereditary hemochromatosis however I could not find any documentation to support this from an objective standpoint.  It seems more likely that he has secondary erythrocytosis from chronic hypoxia associated with his lung disease.         Reviewed and updated this visit by provider:  Tobacco  Allergies  Meds  Problems  Med Hx  Surg Hx  Fam Hx           Review of Systems   Respiratory:  Positive for cough.         Chronic SERNA   Cardiovascular: Negative.           OBJECTIVE:  Vitals:    04/17/24 1053   BP: 134/86   Site: Left Upper Arm   Cuff Size: Medium Adult   Pulse: 57   SpO2: 93%   Weight: 87.3 kg (192 lb 6.4 oz)   Height: 1.829 m (6' 0.01\")        Physical Exam   General: Alert and oriented x3, well-appearing  Neck: No adenopathy, thyromegaly or thyroid nodules  Pulmonary: Normal effort, fair airflow, no rales or rhonchi  CVS: Regular rate and rhythm, normal S1, S2, no S3 or S4, no murmurs; no carotid bruits, 2+ pedal pulses      Medical problems and test results were reviewed with the patient today.     No results found for this or any previous visit (from the past 672 hour(s)).    No results found for any visits on 04/17/24.     ASSESSMENT and PLAN    1. Pulmonary emphysema, unspecified emphysema type (HCC)  Comments:  Stable.  Follows with pulmonary medicine  Orders:  -     albuterol sulfate HFA (PROVENTIL;VENTOLIN;PROAIR) 108 (90 Base) MCG/ACT inhaler; Inhale 2 puffs into the lungs every 4

## 2024-04-18 LAB
EST. AVERAGE GLUCOSE BLD GHB EST-MCNC: 117 MG/DL
HBA1C MFR BLD: 5.7 % (ref 4.8–5.6)

## 2024-04-19 LAB — EPO SERPL-ACNC: 9.1 MIU/ML (ref 2.6–18.5)

## 2024-04-26 ENCOUNTER — HOSPITAL ENCOUNTER (OUTPATIENT)
Dept: CT IMAGING | Age: 64
Discharge: HOME | End: 2024-04-26
Attending: INTERNAL MEDICINE
Payer: COMMERCIAL

## 2024-04-26 DIAGNOSIS — R91.8 PULMONARY NODULES: ICD-10-CM

## 2024-04-26 PROCEDURE — 71250 CT THORAX DX C-: CPT

## 2024-05-15 ENCOUNTER — OFFICE VISIT (OUTPATIENT)
Dept: PULMONOLOGY | Age: 64
End: 2024-05-15
Payer: COMMERCIAL

## 2024-05-15 VITALS
OXYGEN SATURATION: 99 % | DIASTOLIC BLOOD PRESSURE: 80 MMHG | SYSTOLIC BLOOD PRESSURE: 118 MMHG | WEIGHT: 193 LBS | TEMPERATURE: 97.9 F | BODY MASS INDEX: 27.63 KG/M2 | HEART RATE: 74 BPM | RESPIRATION RATE: 18 BRPM | HEIGHT: 70 IN

## 2024-05-15 DIAGNOSIS — R91.8 PULMONARY NODULES: Primary | ICD-10-CM

## 2024-05-15 DIAGNOSIS — J44.9 CHRONIC OBSTRUCTIVE PULMONARY DISEASE, UNSPECIFIED COPD TYPE (HCC): ICD-10-CM

## 2024-05-15 PROCEDURE — 99214 OFFICE O/P EST MOD 30 MIN: CPT | Performed by: INTERNAL MEDICINE

## 2024-05-15 RX ORDER — FLUTICASONE FUROATE, UMECLIDINIUM BROMIDE AND VILANTEROL TRIFENATATE 100; 62.5; 25 UG/1; UG/1; UG/1
1 POWDER RESPIRATORY (INHALATION) DAILY
Qty: 1 EACH | Refills: 11 | Status: SHIPPED | OUTPATIENT
Start: 2024-05-15

## 2024-05-15 NOTE — PROGRESS NOTES
[facility]: FOLLOW-UP Patient Office Visit Note  [address]  [place] [address] [phone]    Patient Name:  Robson Cox  YOB: 1960            Date of Service:  5/15/2024    Chief Complaint   Patient presents with    Follow-up    COPD       History of Present Illness:  This is a 63-year-old male with history of COPD with severe obstruction noted on pulmonary function testing. Patient has been placed on Trelegy and has noted improvement in his chronic shortness of breath. He denies wheezing and cough occurs minimally. No chest pain. Patient has stopped smoking cigarettes but now is vaping. After his last visit a chest CT scan was ordered and showed suspicious pulmonary nodules as well as emphysema. A PET scan was done and this demonstrated mild metabolic activity in the left upper lobe and left lower lobe nodules but no evidence of denise or distant metastatic disease. Biopsy was considered however clear airways leading to these lesions was not seen and with the patient's emphysema it was felt that any type of biopsy would be high risk   Follow-up CT 3 months later revealed stable findings so repeat CT was then done a on April 26 with no significant change.    Currently patient states he is doing fairly well but does have dyspnea on exertion.  Trelegy does help and he is taking that regularly.  He quit smoking cigarettes and now has cut back on vaping although he admits he still does not some.  No cough or chest pain.  Past Medical History:   Diagnosis Date    Allergic rhinitis     Chronic obstructive pulmonary disease (HCC)     inhaler prn, smoker    Cough due to bronchospasm 2/11/2020    chest discomfort (ribs) with cough    Elevated blood pressure reading 2/13/2022    Frequent BP checks and report 2 weeks     Overweight (BMI 25.0-29.9) 11/27/2018    Low carb diet advised/reviewed. Information given.     Pneumonia     twice.  Last episode was

## 2024-11-15 ENCOUNTER — HOSPITAL ENCOUNTER (OUTPATIENT)
Dept: CT IMAGING | Age: 64
Discharge: HOME | End: 2024-11-18
Attending: INTERNAL MEDICINE
Payer: COMMERCIAL

## 2024-11-15 DIAGNOSIS — R91.8 PULMONARY NODULES: ICD-10-CM

## 2024-11-15 PROCEDURE — 71250 CT THORAX DX C-: CPT

## 2024-12-16 ENCOUNTER — OFFICE VISIT (OUTPATIENT)
Dept: PULMONOLOGY | Age: 64
End: 2024-12-16
Payer: COMMERCIAL

## 2024-12-16 VITALS
HEART RATE: 86 BPM | BODY MASS INDEX: 28.73 KG/M2 | SYSTOLIC BLOOD PRESSURE: 133 MMHG | OXYGEN SATURATION: 87 % | DIASTOLIC BLOOD PRESSURE: 78 MMHG | WEIGHT: 194 LBS | HEIGHT: 69 IN | RESPIRATION RATE: 14 BRPM

## 2024-12-16 DIAGNOSIS — J44.9 CHRONIC OBSTRUCTIVE PULMONARY DISEASE, UNSPECIFIED COPD TYPE (HCC): ICD-10-CM

## 2024-12-16 DIAGNOSIS — R91.8 PULMONARY NODULES: Primary | ICD-10-CM

## 2024-12-16 DIAGNOSIS — Z87.891 PERSONAL HISTORY OF TOBACCO USE: ICD-10-CM

## 2024-12-16 PROCEDURE — 99214 OFFICE O/P EST MOD 30 MIN: CPT | Performed by: INTERNAL MEDICINE

## 2024-12-16 NOTE — PROGRESS NOTES
working 55 hours a week.  No significant increase in dyspnea or cough.  Using Trelegy and does not have to use albuterol.    Since he was here last he had complete PFTs which now show moderately severe obstruction and diffusion minutes 30% of predicted.    Repeat CT scan showed stable nodules.  In comparing current CT to July 2023 the nodule area in the left upper lobe appears to be a little smaller and nodule in left lower lobe is no longer seen.    CAT Score:  @catscore@  ACT Score:       No data to display              REVIEW OF SYSTEMS: 10 point review of systems is negative except as reported in HPI.    PHYSICAL EXAM: Body mass index is 28.65 kg/m².  Vitals:    12/16/24 1325   BP: 133/78   Pulse: 86   Resp: 14   SpO2: (!) 87%   Weight: 88 kg (194 lb)   Height: 1.753 m (5' 9\")         General:   Alert, cooperative, no distress, appears stated age.        Eyes:   Conjunctivae/corneas clear. PERRL        Mouth/Throat:  Lips, mucosa, and tongue normal. Teeth and gums normal.        Lungs:     clear     Heart:   Regular rate and rhythm, S1, S2 normal, no murmur, click, rub or gallop.     Abdomen:    Soft, non-tender.     Extremities:  Extremities normal, atraumatic, no cyanosis or edema.     Skin:  Skin color normal. No rashes or lesions     Neurologic:  A&Ox3     DIAGNOSTIC TESTS:                                                                                    LABS:   Lab Results   Component Value Date/Time    WBC 7.8 04/17/2024 11:34 AM    HGB 16.7 04/17/2024 11:34 AM    HCT 51.9 04/17/2024 11:34 AM     04/17/2024 11:34 AM     Imaging: I performed an independent interpretation of the patient's images.  CXR:   XR CHEST STANDARD TWO VW     CT Chest:         CT CHEST WO CONTRAST 11/15/2024    Narrative  CT CHEST WO CONTRAST    HISTORY: COPD.    COMPARISON: 4/26/2024, 10/18/2023, 7/6/2023.    TECHNIQUE: Axial  images of the chest were obtained without infusion of  intravenous contrast.  Dose optimization

## 2024-12-31 DIAGNOSIS — E78.5 DYSLIPIDEMIA: ICD-10-CM

## 2024-12-31 RX ORDER — ATORVASTATIN CALCIUM 20 MG/1
20 TABLET, FILM COATED ORAL DAILY
Qty: 90 TABLET | Refills: 3 | Status: SHIPPED | OUTPATIENT
Start: 2024-12-31

## 2024-12-31 NOTE — TELEPHONE ENCOUNTER
Requested Prescriptions     Pending Prescriptions Disp Refills    atorvastatin (LIPITOR) 20 MG tablet [Pharmacy Med Name: Atorvastatin Calcium 20 MG Oral Tablet] 90 tablet 3     Sig: Take 1 tablet by mouth once daily     Verified rx in last OV date 02/14/24. Pharmacy confirmed. Erx as requested.

## 2025-04-15 ENCOUNTER — TELEPHONE (OUTPATIENT)
Dept: PULMONOLOGY | Age: 65
End: 2025-04-15

## 2025-04-15 DIAGNOSIS — J44.9 CHRONIC OBSTRUCTIVE PULMONARY DISEASE, UNSPECIFIED COPD TYPE (HCC): Primary | ICD-10-CM

## 2025-04-15 DIAGNOSIS — R06.02 SOB (SHORTNESS OF BREATH): ICD-10-CM

## 2025-04-15 NOTE — TELEPHONE ENCOUNTER
Called patient to schedule next appointment with Dr. Santoro. Patient stated that since Friday (4/11/25) he has been very sick and stuck in bed. Patient stated that he has chest pains and increase SOB. Patient stated it started out like allergies that has moved into his chest. He has tried sinus meds with no relief and has started taking mucinex. Patient would like an appointment

## 2025-04-16 ENCOUNTER — OFFICE VISIT (OUTPATIENT)
Dept: PULMONOLOGY | Age: 65
End: 2025-04-16

## 2025-04-16 ENCOUNTER — HOSPITAL ENCOUNTER (OUTPATIENT)
Dept: GENERAL RADIOLOGY | Age: 65
Discharge: HOME | End: 2025-04-18
Payer: COMMERCIAL

## 2025-04-16 ENCOUNTER — HOSPITAL ENCOUNTER (INPATIENT)
Age: 65
LOS: 10 days | Discharge: HOME | End: 2025-04-26
Attending: INTERNAL MEDICINE | Admitting: INTERNAL MEDICINE
Payer: COMMERCIAL

## 2025-04-16 VITALS
RESPIRATION RATE: 20 BRPM | DIASTOLIC BLOOD PRESSURE: 80 MMHG | HEIGHT: 69 IN | TEMPERATURE: 98 F | HEART RATE: 93 BPM | BODY MASS INDEX: 27.4 KG/M2 | WEIGHT: 185 LBS | SYSTOLIC BLOOD PRESSURE: 118 MMHG | OXYGEN SATURATION: 67 %

## 2025-04-16 DIAGNOSIS — Q24.8 INTERATRIAL CARDIAC SHUNT: ICD-10-CM

## 2025-04-16 DIAGNOSIS — J44.9 CHRONIC OBSTRUCTIVE PULMONARY DISEASE, UNSPECIFIED COPD TYPE (HCC): ICD-10-CM

## 2025-04-16 DIAGNOSIS — R06.02 SOB (SHORTNESS OF BREATH): ICD-10-CM

## 2025-04-16 DIAGNOSIS — J96.01 ACUTE HYPOXEMIC RESPIRATORY FAILURE (HCC): ICD-10-CM

## 2025-04-16 DIAGNOSIS — R91.8 PULMONARY NODULES: ICD-10-CM

## 2025-04-16 DIAGNOSIS — R06.02 SHORTNESS OF BREATH: Primary | ICD-10-CM

## 2025-04-16 PROBLEM — J18.9 PNEUMONIA OF BOTH LOWER LOBES DUE TO INFECTIOUS ORGANISM: Status: ACTIVE | Noted: 2025-04-16

## 2025-04-16 PROBLEM — J44.0 CHRONIC OBSTRUCTIVE PULMONARY DISEASE WITH ACUTE LOWER RESPIRATORY INFECTION (HCC): Status: ACTIVE | Noted: 2025-04-16

## 2025-04-16 LAB
ALBUMIN SERPL-MCNC: 3.3 G/DL (ref 3.2–4.6)
ALBUMIN/GLOB SERPL: 1 (ref 1–1.9)
ALP SERPL-CCNC: 65 U/L (ref 40–129)
ALT SERPL-CCNC: 25 U/L (ref 8–55)
ANION GAP SERPL CALC-SCNC: 17 MMOL/L (ref 7–16)
AST SERPL-CCNC: 23 U/L (ref 15–37)
B PERT DNA SPEC QL NAA+PROBE: NOT DETECTED
BASOPHILS # BLD: 0.04 K/UL (ref 0–0.2)
BASOPHILS NFR BLD: 0.3 % (ref 0–2)
BILIRUB SERPL-MCNC: 1.6 MG/DL (ref 0–1.2)
BORDETELLA PARAPERTUSSIS BY PCR: NOT DETECTED
BUN SERPL-MCNC: 31 MG/DL (ref 8–23)
C PNEUM DNA SPEC QL NAA+PROBE: NOT DETECTED
CALCIUM SERPL-MCNC: 9.1 MG/DL (ref 8.8–10.2)
CHLORIDE SERPL-SCNC: 98 MMOL/L (ref 98–107)
CO2 SERPL-SCNC: 27 MMOL/L (ref 20–29)
CREAT SERPL-MCNC: 1.12 MG/DL (ref 0.8–1.3)
DIFFERENTIAL METHOD BLD: ABNORMAL
EOSINOPHIL # BLD: 0.09 K/UL (ref 0–0.8)
EOSINOPHIL NFR BLD: 0.7 % (ref 0.5–7.8)
ERYTHROCYTE [DISTWIDTH] IN BLOOD BY AUTOMATED COUNT: 13 % (ref 11.9–14.6)
FLUAV SUBTYP SPEC NAA+PROBE: NOT DETECTED
FLUBV RNA SPEC QL NAA+PROBE: NOT DETECTED
GLOBULIN SER CALC-MCNC: 3.4 G/DL (ref 2.3–3.5)
GLUCOSE SERPL-MCNC: 115 MG/DL (ref 70–99)
HADV DNA SPEC QL NAA+PROBE: NOT DETECTED
HCOV 229E RNA SPEC QL NAA+PROBE: NOT DETECTED
HCOV HKU1 RNA SPEC QL NAA+PROBE: NOT DETECTED
HCOV NL63 RNA SPEC QL NAA+PROBE: NOT DETECTED
HCOV OC43 RNA SPEC QL NAA+PROBE: NOT DETECTED
HCT VFR BLD AUTO: 54.5 % (ref 41.1–50.3)
HGB BLD-MCNC: 18.4 G/DL (ref 13.6–17.2)
HMPV RNA SPEC QL NAA+PROBE: NOT DETECTED
HPIV1 RNA SPEC QL NAA+PROBE: NOT DETECTED
HPIV2 RNA SPEC QL NAA+PROBE: NOT DETECTED
HPIV3 RNA SPEC QL NAA+PROBE: NOT DETECTED
HPIV4 RNA SPEC QL NAA+PROBE: NOT DETECTED
IMM GRANULOCYTES # BLD AUTO: 0.09 K/UL (ref 0–0.5)
IMM GRANULOCYTES NFR BLD AUTO: 0.7 % (ref 0–5)
LACTATE SERPL-SCNC: 1.4 MMOL/L (ref 0.5–2)
LACTATE SERPL-SCNC: 3.6 MMOL/L (ref 0.5–2)
LYMPHOCYTES # BLD: 1.71 K/UL (ref 0.5–4.6)
LYMPHOCYTES NFR BLD: 13.6 % (ref 13–44)
M PNEUMO DNA SPEC QL NAA+PROBE: NOT DETECTED
MCH RBC QN AUTO: 32.5 PG (ref 26.1–32.9)
MCHC RBC AUTO-ENTMCNC: 33.8 G/DL (ref 31.4–35)
MCV RBC AUTO: 96.3 FL (ref 82–102)
MONOCYTES # BLD: 1.51 K/UL (ref 0.1–1.3)
MONOCYTES NFR BLD: 12 % (ref 4–12)
NEUTS SEG # BLD: 9.14 K/UL (ref 1.7–8.2)
NEUTS SEG NFR BLD: 72.7 % (ref 43–78)
NRBC # BLD: 0 K/UL (ref 0–0.2)
PLATELET # BLD AUTO: 192 K/UL (ref 150–450)
PMV BLD AUTO: 9.3 FL (ref 9.4–12.3)
POTASSIUM SERPL-SCNC: 3.6 MMOL/L (ref 3.5–5.1)
PROCALCITONIN SERPL-MCNC: 0.12 NG/ML (ref 0–0.1)
PROT SERPL-MCNC: 6.7 G/DL (ref 6.3–8.2)
RBC # BLD AUTO: 5.66 M/UL (ref 4.23–5.6)
RSV RNA SPEC QL NAA+PROBE: NOT DETECTED
RV+EV RNA SPEC QL NAA+PROBE: NOT DETECTED
SARS-COV-2 RNA RESP QL NAA+PROBE: NOT DETECTED
SODIUM SERPL-SCNC: 142 MMOL/L (ref 136–145)
WBC # BLD AUTO: 12.6 K/UL (ref 4.3–11.1)

## 2025-04-16 PROCEDURE — 2500000003 HC RX 250 WO HCPCS: Performed by: INTERNAL MEDICINE

## 2025-04-16 PROCEDURE — 0202U NFCT DS 22 TRGT SARS-COV-2: CPT

## 2025-04-16 PROCEDURE — 84145 PROCALCITONIN (PCT): CPT

## 2025-04-16 PROCEDURE — 94640 AIRWAY INHALATION TREATMENT: CPT

## 2025-04-16 PROCEDURE — 80053 COMPREHEN METABOLIC PANEL: CPT

## 2025-04-16 PROCEDURE — 99223 1ST HOSP IP/OBS HIGH 75: CPT | Performed by: INTERNAL MEDICINE

## 2025-04-16 PROCEDURE — 2700000000 HC OXYGEN THERAPY PER DAY

## 2025-04-16 PROCEDURE — 2580000003 HC RX 258: Performed by: INTERNAL MEDICINE

## 2025-04-16 PROCEDURE — 83605 ASSAY OF LACTIC ACID: CPT

## 2025-04-16 PROCEDURE — 6360000002 HC RX W HCPCS: Performed by: INTERNAL MEDICINE

## 2025-04-16 PROCEDURE — 1100000000 HC RM PRIVATE

## 2025-04-16 PROCEDURE — 71046 X-RAY EXAM CHEST 2 VIEWS: CPT

## 2025-04-16 PROCEDURE — 85025 COMPLETE CBC W/AUTO DIFF WBC: CPT

## 2025-04-16 PROCEDURE — 94760 N-INVAS EAR/PLS OXIMETRY 1: CPT

## 2025-04-16 PROCEDURE — 94761 N-INVAS EAR/PLS OXIMETRY MLT: CPT

## 2025-04-16 PROCEDURE — 36415 COLL VENOUS BLD VENIPUNCTURE: CPT

## 2025-04-16 PROCEDURE — 6370000000 HC RX 637 (ALT 250 FOR IP): Performed by: INTERNAL MEDICINE

## 2025-04-16 RX ORDER — SODIUM CHLORIDE, SODIUM LACTATE, POTASSIUM CHLORIDE, AND CALCIUM CHLORIDE .6; .31; .03; .02 G/100ML; G/100ML; G/100ML; G/100ML
500 INJECTION, SOLUTION INTRAVENOUS ONCE
Status: DISCONTINUED | OUTPATIENT
Start: 2025-04-16 | End: 2025-04-16

## 2025-04-16 RX ORDER — ALBUTEROL SULFATE 0.83 MG/ML
2.5 SOLUTION RESPIRATORY (INHALATION)
Status: DISCONTINUED | OUTPATIENT
Start: 2025-04-16 | End: 2025-04-22

## 2025-04-16 RX ORDER — SODIUM CHLORIDE, SODIUM LACTATE, POTASSIUM CHLORIDE, AND CALCIUM CHLORIDE .6; .31; .03; .02 G/100ML; G/100ML; G/100ML; G/100ML
1000 INJECTION, SOLUTION INTRAVENOUS ONCE
Status: COMPLETED | OUTPATIENT
Start: 2025-04-16 | End: 2025-04-16

## 2025-04-16 RX ORDER — ONDANSETRON 2 MG/ML
4 INJECTION INTRAMUSCULAR; INTRAVENOUS EVERY 6 HOURS PRN
Status: DISCONTINUED | OUTPATIENT
Start: 2025-04-16 | End: 2025-04-26 | Stop reason: HOSPADM

## 2025-04-16 RX ORDER — ASPIRIN 81 MG/1
81 TABLET ORAL DAILY
Status: DISCONTINUED | OUTPATIENT
Start: 2025-04-17 | End: 2025-04-26 | Stop reason: HOSPADM

## 2025-04-16 RX ORDER — ACETAMINOPHEN 325 MG/1
650 TABLET ORAL EVERY 6 HOURS PRN
Status: DISCONTINUED | OUTPATIENT
Start: 2025-04-16 | End: 2025-04-26 | Stop reason: HOSPADM

## 2025-04-16 RX ORDER — SODIUM CHLORIDE 0.9 % (FLUSH) 0.9 %
5-40 SYRINGE (ML) INJECTION PRN
Status: DISCONTINUED | OUTPATIENT
Start: 2025-04-16 | End: 2025-04-26 | Stop reason: HOSPADM

## 2025-04-16 RX ORDER — ACETAMINOPHEN 650 MG/1
650 SUPPOSITORY RECTAL EVERY 6 HOURS PRN
Status: DISCONTINUED | OUTPATIENT
Start: 2025-04-16 | End: 2025-04-26 | Stop reason: HOSPADM

## 2025-04-16 RX ORDER — FLUTICASONE FUROATE, UMECLIDINIUM BROMIDE AND VILANTEROL TRIFENATATE 100; 62.5; 25 UG/1; UG/1; UG/1
1 POWDER RESPIRATORY (INHALATION) DAILY
Qty: 1 EACH | Refills: 11 | Status: CANCELLED | OUTPATIENT
Start: 2025-04-16

## 2025-04-16 RX ORDER — AZITHROMYCIN 250 MG/1
500 TABLET, FILM COATED ORAL EVERY 24 HOURS
Status: COMPLETED | OUTPATIENT
Start: 2025-04-16 | End: 2025-04-18

## 2025-04-16 RX ORDER — ALBUTEROL SULFATE 0.83 MG/ML
2.5 SOLUTION RESPIRATORY (INHALATION) EVERY 6 HOURS PRN
Qty: 120 EACH | Refills: 11 | Status: CANCELLED | OUTPATIENT
Start: 2025-04-16

## 2025-04-16 RX ORDER — SODIUM CHLORIDE 9 MG/ML
INJECTION, SOLUTION INTRAVENOUS PRN
Status: DISCONTINUED | OUTPATIENT
Start: 2025-04-16 | End: 2025-04-26 | Stop reason: HOSPADM

## 2025-04-16 RX ORDER — ENOXAPARIN SODIUM 100 MG/ML
40 INJECTION SUBCUTANEOUS EVERY EVENING
Status: DISCONTINUED | OUTPATIENT
Start: 2025-04-16 | End: 2025-04-26 | Stop reason: HOSPADM

## 2025-04-16 RX ORDER — ARFORMOTEROL TARTRATE 15 UG/2ML
15 SOLUTION RESPIRATORY (INHALATION)
Status: DISCONTINUED | OUTPATIENT
Start: 2025-04-16 | End: 2025-04-26 | Stop reason: HOSPADM

## 2025-04-16 RX ORDER — SODIUM CHLORIDE, SODIUM LACTATE, POTASSIUM CHLORIDE, AND CALCIUM CHLORIDE .6; .31; .03; .02 G/100ML; G/100ML; G/100ML; G/100ML
1000 INJECTION, SOLUTION INTRAVENOUS ONCE
Status: DISCONTINUED | OUTPATIENT
Start: 2025-04-16 | End: 2025-04-16

## 2025-04-16 RX ORDER — SODIUM CHLORIDE 0.9 % (FLUSH) 0.9 %
5-40 SYRINGE (ML) INJECTION EVERY 12 HOURS SCHEDULED
Status: DISCONTINUED | OUTPATIENT
Start: 2025-04-16 | End: 2025-04-26 | Stop reason: HOSPADM

## 2025-04-16 RX ORDER — PREDNISONE 20 MG/1
40 TABLET ORAL DAILY
Status: DISCONTINUED | OUTPATIENT
Start: 2025-04-16 | End: 2025-04-18

## 2025-04-16 RX ORDER — POLYETHYLENE GLYCOL 3350 17 G/17G
17 POWDER, FOR SOLUTION ORAL DAILY PRN
Status: DISCONTINUED | OUTPATIENT
Start: 2025-04-16 | End: 2025-04-26 | Stop reason: HOSPADM

## 2025-04-16 RX ORDER — BUTALBITAL, ACETAMINOPHEN AND CAFFEINE 50; 325; 40 MG/1; MG/1; MG/1
1 TABLET ORAL EVERY 4 HOURS PRN
Status: DISCONTINUED | OUTPATIENT
Start: 2025-04-16 | End: 2025-04-26 | Stop reason: HOSPADM

## 2025-04-16 RX ORDER — ATORVASTATIN CALCIUM 20 MG/1
20 TABLET, FILM COATED ORAL DAILY
Status: DISCONTINUED | OUTPATIENT
Start: 2025-04-17 | End: 2025-04-26 | Stop reason: HOSPADM

## 2025-04-16 RX ORDER — ONDANSETRON 4 MG/1
4 TABLET, ORALLY DISINTEGRATING ORAL EVERY 8 HOURS PRN
Status: DISCONTINUED | OUTPATIENT
Start: 2025-04-16 | End: 2025-04-26 | Stop reason: HOSPADM

## 2025-04-16 RX ORDER — BUDESONIDE 0.5 MG/2ML
0.5 INHALANT ORAL
Status: DISCONTINUED | OUTPATIENT
Start: 2025-04-16 | End: 2025-04-26 | Stop reason: HOSPADM

## 2025-04-16 RX ORDER — IBUPROFEN 400 MG/1
400 TABLET, FILM COATED ORAL PRN
Status: DISCONTINUED | OUTPATIENT
Start: 2025-04-16 | End: 2025-04-16 | Stop reason: SDUPTHER

## 2025-04-16 RX ADMIN — ALBUTEROL SULFATE 2.5 MG: 2.5 SOLUTION RESPIRATORY (INHALATION) at 19:19

## 2025-04-16 RX ADMIN — WATER 1000 MG: 1 INJECTION INTRAMUSCULAR; INTRAVENOUS; SUBCUTANEOUS at 18:17

## 2025-04-16 RX ADMIN — ARFORMOTEROL TARTRATE 15 MCG: 15 SOLUTION RESPIRATORY (INHALATION) at 19:19

## 2025-04-16 RX ADMIN — SODIUM CHLORIDE, SODIUM LACTATE, POTASSIUM CHLORIDE, AND CALCIUM CHLORIDE 1000 ML: .6; .31; .03; .02 INJECTION, SOLUTION INTRAVENOUS at 18:22

## 2025-04-16 RX ADMIN — SODIUM CHLORIDE, PRESERVATIVE FREE 10 ML: 5 INJECTION INTRAVENOUS at 19:45

## 2025-04-16 RX ADMIN — PREDNISONE 40 MG: 20 TABLET ORAL at 18:27

## 2025-04-16 RX ADMIN — ALBUTEROL SULFATE 2.5 MG: 2.5 SOLUTION RESPIRATORY (INHALATION) at 15:53

## 2025-04-16 RX ADMIN — BUDESONIDE INHALATION 500 MCG: 0.5 SUSPENSION RESPIRATORY (INHALATION) at 19:19

## 2025-04-16 RX ADMIN — AZITHROMYCIN DIHYDRATE 500 MG: 250 TABLET ORAL at 17:15

## 2025-04-16 RX ADMIN — ENOXAPARIN SODIUM 40 MG: 100 INJECTION SUBCUTANEOUS at 18:18

## 2025-04-16 NOTE — PROGRESS NOTES
TRANSFER - IN REPORT:    Verbal report received from Amada on Robson Cox  being received from Dr. Wesley's office for change in patient condition (Low Oxygen Saturation)      Report consisted of patient's Situation, Background, Assessment and   Recommendations(SBAR).     Information from the following report(s) Nurse Handoff Report and Adult Overview was reviewed with the receiving nurse.    Opportunity for questions and clarification was provided.      Report given to HUGH Moreno who will assume care of patient upon arrival to unit

## 2025-04-16 NOTE — PROGRESS NOTES
Name:  Robson Cox  YOB: 1960   MRN: [id number]      Office Visit: 4/16/2025       Assessment & Plan (Medical Decision Making)    Impression: 64 y.o. male here for work in for shortness of breath, h/o of severe COPD/Emphysema and pulmonary nodules      ICD-10-CM    1. Pulmonary nodules  R91.8       2. Chronic obstructive pulmonary disease, unspecified COPD type (HCC)  J44.9          Assessment & Plan  1. Acute hypoxic respiratory failure: Acute.  - Symptoms and radiographic findings suggest pneumonia, likely viral, superimposed on COPD.  - Significant drop in oxygen saturation requiring 6 L.  - Admit for antibiotics, steroids, breathing treatments, and additional lab tests.    2. Chronic obstructive pulmonary disease (COPD): Chronic.  - History of moderately severe COPD, FEV1 of 1.65 L, slightly improved on follow-up.  - Quit smoking in July 2023.  - Uses Trelegy and albuterol as needed.  - Current exacerbation requires hospitalization for intensive treatment including steroids and breathing treatments.    Follow-up  - Direct admission to hospital for intensive treatment.     No orders of the defined types were placed in this encounter.  Leslie Wesley MD    No specialty comments available.  No problem-specific Assessment & Plan notes found for this encounter.    The patient (or guardian, if applicable) and other individuals in attendance with the patient were advised that Artificial Intelligence will be utilized during this visit to record, process the conversation to generate a clinical note, and support improvement of the AI technology. The patient (or guardian, if applicable) and other individuals in attendance at the appointment consented to the use of AI, including the recording.    _________________________________________________________________________    HISTORY OF PRESENT ILLNESS:    Mr. Robson Cox is a 64 y.o. male who is seen at [facility] for  Chest

## 2025-04-16 NOTE — H&P
HISTORY AND PHYSICAL  Robson Cox  4/16/2025   Date of Admission:  (Not on file)    The patient's chart is reviewed and the patient is discussed with the staff.    Subjective:     64-year-old male presenting for evaluation, reporting illness and confinement to bed since April 11, 2025. He describes thoracic pain and worsening dyspnea, initially manifesting with symptoms resembling allergic reactions. The patient has a history of pulmonary nodules monitored radiographically and moderately severe chronic obstructive pulmonary disease (COPD) managed with Trelegy and albuterol, with a forced expiratory volume in one second (FEV1) of 1.65 liters, showing slight improvement on follow-up. He ceased smoking in 2023. Imaging revealed severe emphysema predominantly affecting the upper lobes and acute bilateral pulmonary infiltrates on  film.    The onset of symptoms occurred last week following a rib contusion. The patient experienced significant hypertension subsequent to an episode of anger directed at a coworker. He reported sensations of hot and cold without pyrexia. He does not use supplemental oxygen at home and has no edema or wheezing beyond his baseline. He has had no contact with sick individuals and resides with his healthy wife. There have been no recent procedures, surgeries, hospital admissions, or travel. He is not on anticoagulant therapy. The patient reports persistent pain associated with coughing or sneezing, which has remained unchanged over the past 4-5 days. He experiences thoracic pain during coughing and expectorates a small amount of sputum. He adheres to his Trelegy regimen and uses albuterol occasionally. Initially, he self-medicated with acetaminophen and began using guaifenesin (Mucinex) upon the involvement of his chest.     He was hypoxemic to 67% and needed 6L to get to 90%. He otherwise appears clinically  Activity    Alcohol use: Not Currently    Drug use: No    Sexual activity: Defer   Other Topics Concern    Not on file   Social History Narrative    Not on file     Social Drivers of Health     Financial Resource Strain: Low Risk  (4/17/2024)    Overall Financial Resource Strain (CARDIA)     Difficulty of Paying Living Expenses: Not hard at all   Food Insecurity: No Food Insecurity (4/17/2024)    Hunger Vital Sign     Worried About Running Out of Food in the Last Year: Never true     Ran Out of Food in the Last Year: Never true   Transportation Needs: Unknown (4/17/2024)    PRAPARE - Transportation     Lack of Transportation (Medical): Not on file     Lack of Transportation (Non-Medical): No   Physical Activity: Not on file   Stress: Not on file   Social Connections: Unknown (12/15/2021)    Received from [employer]    Social Connections     Frequency of Communication with Friends and Family: Not asked     Frequency of Social Gatherings with Friends and Family: Not asked   Intimate Partner Violence: Unknown (12/15/2021)    Received from [employer]    Intimate Partner Violence     Fear of Current or Ex-Partner: Not asked     Emotionally Abused: Not asked     Physically Abused: Not asked     Sexually Abused: Not asked   Housing Stability: Unknown (4/17/2024)    Housing Stability Vital Sign     Unable to Pay for Housing in the Last Year: Not on file     Number of Places Lived in the Last Year: Not on file     Unstable Housing in the Last Year: No     Family History   Problem Relation Age of Onset    No Known Problems Father     Cancer Mother      Allergies   Allergen Reactions    Shellfish-Derived Products Other (See Comments)     Dizzy      Objective:   There were no vitals filed for this visit.  PHYSICAL EXAM   Constitutional:  the patient is well developed and in no acute distress  EENMT:  Sclera clear, pupils equal, oral mucosa moist  Respiratory: symmetric chest rise. diminished,

## 2025-04-16 NOTE — PROGRESS NOTES
Patient taken to room 803 on 6 lpm O2 in wheelchair by this RRT.  Patient left in care of accepting RN.

## 2025-04-16 NOTE — PROGRESS NOTES
This RN notified provider via [facility] of critical lab result of lactic acid 3.6. Orders entered for fluid bolus and repeat lactic lab.

## 2025-04-16 NOTE — PROGRESS NOTES
Discharge Summary/Instructions after an Endoscopic Procedure  Patient Name: Aracelis Martinez  Patient MRN: [id number]  Patient YOB: 1953 Tuesday, November 19, 2019  Jonathan Oneill MD  RESTRICTIONS:  During your procedure today, you received medications for sedation.  These   medications may affect your judgment, balance and coordination.  Therefore,   for 24 hours, you have the following restrictions:   - DO NOT drive a car, operate machinery, make legal/financial decisions,   sign important papers or drink alcohol.    ACTIVITY:  Today: no heavy lifting, straining or running due to procedural   sedation/anesthesia.  The following day: return to full activity including work.  DIET:  Eat and drink normally unless instructed otherwise.     TREATMENT FOR COMMON SIDE EFFECTS:  - Mild abdominal pain, nausea, belching, bloating or excessive gas:  rest,   eat lightly and use a heating pad.  - Sore Throat: treat with throat lozenges and/or gargle with warm salt   water.  - Because air was used during the procedure, expelling large amounts of air   from your rectum or belching is normal.  - If a bowel prep was taken, you may not have a bowel movement for 1-3 days.    This is normal.  SYMPTOMS TO WATCH FOR AND REPORT TO YOUR PHYSICIAN:  1. Abdominal pain or bloating, other than gas cramps.  2. Chest pain.  3. Back pain.  4. Signs of infection such as: chills or fever occurring within 24 hours   after the procedure.  5. Rectal bleeding, which would show as bright red, maroon, or black stools.   (A tablespoon of blood from the rectum is not serious, especially if   hemorrhoids are present.)  6. Vomiting.  7. Weakness or dizziness.  GO DIRECTLY TO THE NEAREST EMERGENCY ROOM IF YOU HAVE ANY OF THE FOLLOWING:      Difficulty breathing              Chills and/or fever over 101 F   Persistent vomiting and/or vomiting blood   Severe abdominal pain   Severe chest pain   Black, tarry stools   Bleeding- more than one tablespoon   Any  I have spoken with [facility] access center.   Patient is approved for room 803.    TRANSFER - OUT REPORT:    Verbal report given to Amy REESE on Robson Cox  being transferred to 803 for urgent transfer       Report consisted of patient's Situation, Background, Assessment and   Recommendations(SBAR).     Information from the following report(s) Recent Results was reviewed with the receiving nurse.           Opportunity for questions and clarification was provided.      Patient transported with:  O2 @ 6lpm by wheelchair        other symptom or condition that you feel may need urgent attention  Your doctor recommends these additional instructions:  If any biopsies were taken, your doctors clinic will contact you in 1 to 2   weeks with any results.  - Patient has a contact number available for emergencies.  The signs and   symptoms of potential delayed complications were discussed with the   patient.  Return to normal activities tomorrow.  Written discharge   instructions were provided to the patient.   - Discharge patient to home (ambulatory).   - Resume previous diet.   - Continue present medications.   - Await pathology results.   - Return to referring physician after studies are complete.  For questions, problems or results please call your physician - Jonathan Oneill MD at Work:  [phone].  [facility], EMERGENCY ROOM PHONE NUMBER: [phone]  IF A COMPLICATION OR EMERGENCY SITUATION ARISES AND YOU ARE UNABLE TO REACH   YOUR PHYSICIAN - GO DIRECTLY TO THE EMERGENCY ROOM.  Jonathan Oneill MD  11/19/2019 8:42:23 AM  This report has been verified and signed electronically.  [employer]

## 2025-04-16 NOTE — PROGRESS NOTES
Spoke with Raquel RN access center with request to direct admission for acute respiratory failure.  Bed request in place.  Has call back # for this RRT and office once bed available.

## 2025-04-16 NOTE — TELEPHONE ENCOUNTER
LOV 12/16/2024 Dr Santoro    Call in to office I have been asked to field.  He reports BP increased Friday to 145/95.  COPD, he reports no fever.  He is coughing up minimal secretions.  He reports chest hurts when he coughs. He is wheezing. He is taking Trelegy. He is using Albuterol as needed. Taking Tylenol cold and Mucinex with minimal improvement.  He reports has pneumonia prior.  He will arrive today at 110 for CXR followed by appointment to see  Maryjane. He reports that started as sinus symptoms and is now settled in his chest.

## 2025-04-17 ENCOUNTER — APPOINTMENT (OUTPATIENT)
Dept: GENERAL RADIOLOGY | Age: 65
End: 2025-04-17
Attending: INTERNAL MEDICINE
Payer: COMMERCIAL

## 2025-04-17 ENCOUNTER — APPOINTMENT (OUTPATIENT)
Dept: CT IMAGING | Age: 65
End: 2025-04-17
Attending: INTERNAL MEDICINE
Payer: COMMERCIAL

## 2025-04-17 LAB
ANION GAP SERPL CALC-SCNC: 11 MMOL/L (ref 7–16)
BASOPHILS # BLD: 0.01 K/UL (ref 0–0.2)
BASOPHILS NFR BLD: 0.1 % (ref 0–2)
BUN SERPL-MCNC: 24 MG/DL (ref 8–23)
CALCIUM SERPL-MCNC: 8.9 MG/DL (ref 8.8–10.2)
CHLORIDE SERPL-SCNC: 100 MMOL/L (ref 98–107)
CO2 SERPL-SCNC: 24 MMOL/L (ref 20–29)
CREAT SERPL-MCNC: 0.92 MG/DL (ref 0.8–1.3)
DIFFERENTIAL METHOD BLD: ABNORMAL
EOSINOPHIL # BLD: 0 K/UL (ref 0–0.8)
EOSINOPHIL NFR BLD: 0 % (ref 0.5–7.8)
ERYTHROCYTE [DISTWIDTH] IN BLOOD BY AUTOMATED COUNT: 13.2 % (ref 11.9–14.6)
GLUCOSE SERPL-MCNC: 140 MG/DL (ref 70–99)
HCT VFR BLD AUTO: 48.9 % (ref 41.1–50.3)
HGB BLD-MCNC: 16.7 G/DL (ref 13.6–17.2)
IMM GRANULOCYTES # BLD AUTO: 0.09 K/UL (ref 0–0.5)
IMM GRANULOCYTES NFR BLD AUTO: 0.9 % (ref 0–5)
LYMPHOCYTES # BLD: 0.59 K/UL (ref 0.5–4.6)
LYMPHOCYTES NFR BLD: 5.9 % (ref 13–44)
MCH RBC QN AUTO: 31.7 PG (ref 26.1–32.9)
MCHC RBC AUTO-ENTMCNC: 34.2 G/DL (ref 31.4–35)
MCV RBC AUTO: 93 FL (ref 82–102)
MONOCYTES # BLD: 0.2 K/UL (ref 0.1–1.3)
MONOCYTES NFR BLD: 2 % (ref 4–12)
NEUTS SEG # BLD: 9.04 K/UL (ref 1.7–8.2)
NEUTS SEG NFR BLD: 91.1 % (ref 43–78)
NRBC # BLD: 0 K/UL (ref 0–0.2)
PLATELET # BLD AUTO: 201 K/UL (ref 150–450)
PMV BLD AUTO: 9.3 FL (ref 9.4–12.3)
POTASSIUM SERPL-SCNC: 3.8 MMOL/L (ref 3.5–5.1)
RBC # BLD AUTO: 5.26 M/UL (ref 4.23–5.6)
SODIUM SERPL-SCNC: 135 MMOL/L (ref 136–145)
WBC # BLD AUTO: 9.9 K/UL (ref 4.3–11.1)

## 2025-04-17 PROCEDURE — 94660 CPAP INITIATION&MGMT: CPT

## 2025-04-17 PROCEDURE — 71045 X-RAY EXAM CHEST 1 VIEW: CPT

## 2025-04-17 PROCEDURE — 36415 COLL VENOUS BLD VENIPUNCTURE: CPT

## 2025-04-17 PROCEDURE — 85025 COMPLETE CBC W/AUTO DIFF WBC: CPT

## 2025-04-17 PROCEDURE — 2580000003 HC RX 258: Performed by: STUDENT IN AN ORGANIZED HEALTH CARE EDUCATION/TRAINING PROGRAM

## 2025-04-17 PROCEDURE — 2500000003 HC RX 250 WO HCPCS: Performed by: INTERNAL MEDICINE

## 2025-04-17 PROCEDURE — 94640 AIRWAY INHALATION TREATMENT: CPT

## 2025-04-17 PROCEDURE — 2700000000 HC OXYGEN THERAPY PER DAY

## 2025-04-17 PROCEDURE — 1100000000 HC RM PRIVATE

## 2025-04-17 PROCEDURE — 99232 SBSQ HOSP IP/OBS MODERATE 35: CPT | Performed by: INTERNAL MEDICINE

## 2025-04-17 PROCEDURE — 6360000002 HC RX W HCPCS: Performed by: INTERNAL MEDICINE

## 2025-04-17 PROCEDURE — 6360000004 HC RX CONTRAST MEDICATION: Performed by: NURSE PRACTITIONER

## 2025-04-17 PROCEDURE — 94760 N-INVAS EAR/PLS OXIMETRY 1: CPT

## 2025-04-17 PROCEDURE — 71260 CT THORAX DX C+: CPT

## 2025-04-17 PROCEDURE — 6370000000 HC RX 637 (ALT 250 FOR IP): Performed by: INTERNAL MEDICINE

## 2025-04-17 PROCEDURE — 94761 N-INVAS EAR/PLS OXIMETRY MLT: CPT

## 2025-04-17 PROCEDURE — 80048 BASIC METABOLIC PNL TOTAL CA: CPT

## 2025-04-17 PROCEDURE — 5A0955A ASSISTANCE WITH RESPIRATORY VENTILATION, GREATER THAN 96 CONSECUTIVE HOURS, HIGH NASAL FLOW/VELOCITY: ICD-10-PCS | Performed by: STUDENT IN AN ORGANIZED HEALTH CARE EDUCATION/TRAINING PROGRAM

## 2025-04-17 RX ORDER — SODIUM CHLORIDE FOR INHALATION 3 %
4 VIAL, NEBULIZER (ML) INHALATION 2 TIMES DAILY
Status: DISCONTINUED | OUTPATIENT
Start: 2025-04-17 | End: 2025-04-26 | Stop reason: HOSPADM

## 2025-04-17 RX ORDER — SODIUM CHLORIDE FOR INHALATION 3 %
4 VIAL, NEBULIZER (ML) INHALATION 2 TIMES DAILY
Status: DISCONTINUED | OUTPATIENT
Start: 2025-04-17 | End: 2025-04-17

## 2025-04-17 RX ORDER — FUROSEMIDE 10 MG/ML
40 INJECTION INTRAMUSCULAR; INTRAVENOUS DAILY
Status: DISCONTINUED | OUTPATIENT
Start: 2025-04-17 | End: 2025-04-26 | Stop reason: HOSPADM

## 2025-04-17 RX ADMIN — IOHEXOL 75 ML: 350 INJECTION, SOLUTION INTRAVENOUS at 09:51

## 2025-04-17 RX ADMIN — SODIUM CHLORIDE, PRESERVATIVE FREE 5 ML: 5 INJECTION INTRAVENOUS at 08:33

## 2025-04-17 RX ADMIN — ALBUTEROL SULFATE 2.5 MG: 2.5 SOLUTION RESPIRATORY (INHALATION) at 07:30

## 2025-04-17 RX ADMIN — ARFORMOTEROL TARTRATE 15 MCG: 15 SOLUTION RESPIRATORY (INHALATION) at 19:25

## 2025-04-17 RX ADMIN — ARFORMOTEROL TARTRATE 15 MCG: 15 SOLUTION RESPIRATORY (INHALATION) at 07:30

## 2025-04-17 RX ADMIN — ALBUTEROL SULFATE 2.5 MG: 2.5 SOLUTION RESPIRATORY (INHALATION) at 15:32

## 2025-04-17 RX ADMIN — AZITHROMYCIN DIHYDRATE 500 MG: 250 TABLET ORAL at 16:13

## 2025-04-17 RX ADMIN — BUDESONIDE INHALATION 500 MCG: 0.5 SUSPENSION RESPIRATORY (INHALATION) at 19:25

## 2025-04-17 RX ADMIN — ATORVASTATIN CALCIUM 20 MG: 20 TABLET, FILM COATED ORAL at 08:32

## 2025-04-17 RX ADMIN — ASPIRIN 81 MG: 81 TABLET ORAL at 08:32

## 2025-04-17 RX ADMIN — ALBUTEROL SULFATE 2.5 MG: 2.5 SOLUTION RESPIRATORY (INHALATION) at 11:24

## 2025-04-17 RX ADMIN — FUROSEMIDE 40 MG: 10 INJECTION, SOLUTION INTRAMUSCULAR; INTRAVENOUS at 11:29

## 2025-04-17 RX ADMIN — ENOXAPARIN SODIUM 40 MG: 100 INJECTION SUBCUTANEOUS at 17:50

## 2025-04-17 RX ADMIN — PREDNISONE 40 MG: 20 TABLET ORAL at 08:32

## 2025-04-17 RX ADMIN — ALBUTEROL SULFATE 2.5 MG: 2.5 SOLUTION RESPIRATORY (INHALATION) at 19:25

## 2025-04-17 RX ADMIN — WATER 1000 MG: 1 INJECTION INTRAMUSCULAR; INTRAVENOUS; SUBCUTANEOUS at 16:13

## 2025-04-17 RX ADMIN — Medication 4 ML: at 11:24

## 2025-04-17 RX ADMIN — SODIUM CHLORIDE, PRESERVATIVE FREE 10 ML: 5 INJECTION INTRAVENOUS at 20:25

## 2025-04-17 RX ADMIN — BUDESONIDE INHALATION 500 MCG: 0.5 SUSPENSION RESPIRATORY (INHALATION) at 07:30

## 2025-04-17 RX ADMIN — Medication 4 ML: at 19:25

## 2025-04-17 NOTE — PLAN OF CARE
Problem: Discharge Planning  Goal: Discharge to home or other facility with appropriate resources  4/17/2025 0749 by Padmini Kenny, RN  Outcome: Progressing  4/17/2025 0003 by Lucinda Love, RN  Outcome: Progressing  Flowsheets (Taken 4/16/2025 1521 by Narcisa Jaeger, RN)  Discharge to home or other facility with appropriate resources:   Identify barriers to discharge with patient and caregiver   Identify discharge learning needs (meds, wound care, etc)

## 2025-04-17 NOTE — ACP (ADVANCE CARE PLANNING)
Advance Care Planning   General Advance Care Planning (ACP) Conversation    Date of Conversation: 4/16/2025  Conducted with: Patient with Decision Making Capacity  Other persons present: None    Healthcare Decision Maker:    Primary Decision Maker: Karrie Cox - [facility] - [phone]    Today we documented Decision Maker(s) consistent with Legal Next of Kin hierarchy.  Content/Action Overview:  Has ACP document(s) NOT on file - requested patient to provide  Reviewed DNR/DNI and patient elects Full Code (Attempt Resuscitation)        Length of Voluntary ACP Conversation in minutes:  <16 minutes (Non-Billable)    Georgiana Bush RN

## 2025-04-17 NOTE — CARE COORDINATION
MSN, Cm:  spoke with patient this afternoon about discharge planning.  Patient lives with his wife in own home with 2 steps for entrance.  Patient is independent with all ADL's and requires no equipment for ambulation.  Patient denies any HH, rehab, palliative care, or home oxygen currently.  Patient works full time and is able to drive himself to all appointments.  Patient was admitted for SOB with sats at 67% on room air, and cough.  Patient was a direct admit from pulmonary office.  CT bilateral pneumonia and pulmonary emphysema.  LA at 3.6 in ED with IV fluids started.  IV abx and steroids started along with neb tx.  Patient currently on Airvo with room air at baseline.  PT consulted for evaluation and recommendations.  Case Management will continue to follow for any discharge needs.       04/17/25 1302   Service Assessment   Patient Orientation Alert and Oriented   Cognition Alert   History Provided By Patient   Primary Caregiver Self   Support Systems Spouse/Significant Other;Family Members   Patient's Healthcare Decision Maker is: Legal Next of Kin   PCP Verified by CM Yes   Last Visit to PCP Within last 3 months   Prior Functional Level Independent in ADLs/IADLs   Current Functional Level Other (see comment)  (PT consulted)   Can patient return to prior living arrangement Yes   Ability to make needs known: Good   Family able to assist with home care needs: Yes   Would you like for me to discuss the discharge plan with any other family members/significant others, and if so, who? Yes  (Wife)   Financial Resources Other (Comment)  (Chuck MONCADA)   Community Resources None   Social/Functional History   Lives With Spouse   Type of Home Mobile home   Home Layout One level   Home Access Stairs to enter with rails   Entrance Stairs - Number of Steps 2   Entrance Stairs - Rails Right   Bathroom Shower/Tub Walk-in shower   Bathroom Toilet Standard   Bathroom Equipment None   Bathroom Accessibility Accessible   Home

## 2025-04-17 NOTE — CONSULTS
[facility] Hospitalist Consult   Admit Date:  2025  3:14 PM   Name:  Robson Cox   Age:  64 y.o.  Sex:  male  :  1960   MRN:  [id number]   Room:  [facility]/    Presenting/Chief Complaint: No chief complaint on file.    Reason(s) for Admission: Acute hypoxic respiratory failure [J96.01]  Acute hypoxemic respiratory failure [J96.01]     Hospitalists consulted by Saud Euceda MD for: Assumed care    History of Presenting Illness:   Robson Cox is a 64 y.o. male with history of allergic rhinitis, COPD, hypertension who presented to his pulmonologist due to reporting feeling ill since .  Patient has history of pulmonary nodules and moderately severe chronic obstructive pulmonary disease managed with Trelegy and albuterol.  Patient denies any sick contacts.  Has associated coughing, sneezing just been unchanged over the last 5 days.  Patient adheres to his Trelegy regimen and albuterol.  Patient was hypoxemic 67% and required 6 L to get back to 90%.      Assessment & Plan:   Acute hypoxemic respiratory failure  - Patient with continued hypoxia.  9 L nasal cannula at 92% this morning  - Continue wean oxygen as tolerated  - Patient started on prophylactic treatment for pneumonia and COPD exacerbation  - Pulmonology following    Pulmonary emphysema  - Walk test prior to discharge    Pneumonia of both lower lobes due to infectious organism  - Continue Rocephin azithromycin  - Sputum culture    COPD  - Prednisone x 5 days  - DuoNebs  - Continue wean oxygen as tolerated  - Antibiotics as above  - CT PE pending    PT/OT evals ordered?  Not ordered; patient not expected to need rehab  Diet:  ADULT DIET; Regular  VTE prophylaxis: Lovenox  Code status: Full Code    Non-peripheral Lines and Tubes (if present):          Telemetry (if present):           Hospital Problems:  Principal Problem:    Acute hypoxemic respiratory failure  Active Problems:    Pulmonary emphysema (HCC)    Pneumonia of

## 2025-04-17 NOTE — PROGRESS NOTES
Robson Cox  Admission Date: 4/16/2025         Daily Progress Note: 4/17/2025    The patient's chart is reviewed and the patient is discussed with the staff.    Background:   64-year-old male reporting illness and confinement to bed since April 11, 2025. He describes thoracic pain and worsening dyspnea, initially manifesting with symptoms resembling allergic reactions. History of pulmonary nodules, moderately severe chronic obstructive pulmonary disease (COPD) with severe emphysema managed with Trelegy and albuterol.  Quit smoking in 2023. Imaging revealed acute bilateral pulmonary infiltrates on  film.    The onset of symptoms occurred last week following a rib contusion. The patient experienced significant hypertension subsequent to an episode of anger directed at a coworker. He reported sensations of hot and cold without pyrexia. He does not use supplemental oxygen.  no sick contacts and resides with his healthy wife. There have been no recent procedures, surgeries, hospital admissions, or travel. The patient reports persistent pain associated with coughing or sneezing, which has remained unchanged over the past 4-5 days. He experiences thoracic pain during coughing and expectorates a small amount of sputum. Initially, he self-medicated with acetaminophen and began using guaifenesin (Mucinex) upon the involvement of his chest.     He was hypoxemic to 67% and needed 6L to get to 90%. He otherwise appears clinically stable.     Subjective:     Now on 12L NC, sats 90-93%.  States feeling ok and breathing seems a bit easier.  No cough or sputum.  Denies chest discomfort, swelling.  No fevers, night sweats.    Current Facility-Administered Medications   Medication Dose Route Frequency    aspirin EC tablet 81 mg  81 mg Oral Daily    atorvastatin (LIPITOR) tablet 20 mg  20 mg Oral Daily    butalbital-acetaminophen-caffeine (FIORICET, ESGIC) per tablet 1 tablet  1 tablet Oral Q4H PRN

## 2025-04-18 LAB
ARTERIAL PATENCY WRIST A: POSITIVE
B PERT DNA SPEC QL NAA+PROBE: NOT DETECTED
BASE EXCESS BLD CALC-SCNC: 7.7 MMOL/L
BASOPHILS # BLD: 0.03 K/UL (ref 0–0.2)
BASOPHILS NFR BLD: 0.2 % (ref 0–2)
BDY SITE: ABNORMAL
BORDETELLA PARAPERTUSSIS BY PCR: NOT DETECTED
C PNEUM DNA SPEC QL NAA+PROBE: NOT DETECTED
DIFFERENTIAL METHOD BLD: ABNORMAL
EOSINOPHIL # BLD: 0.02 K/UL (ref 0–0.8)
EOSINOPHIL NFR BLD: 0.1 % (ref 0.5–7.8)
ERYTHROCYTE [DISTWIDTH] IN BLOOD BY AUTOMATED COUNT: 13.1 % (ref 11.9–14.6)
FLUAV SUBTYP SPEC NAA+PROBE: NOT DETECTED
FLUBV RNA SPEC QL NAA+PROBE: NOT DETECTED
GAS FLOW.O2 O2 DELIVERY SYS: ABNORMAL
HADV DNA SPEC QL NAA+PROBE: NOT DETECTED
HCO3 BLD-SCNC: 31.7 MMOL/L (ref 21–28)
HCOV 229E RNA SPEC QL NAA+PROBE: NOT DETECTED
HCOV HKU1 RNA SPEC QL NAA+PROBE: NOT DETECTED
HCOV NL63 RNA SPEC QL NAA+PROBE: NOT DETECTED
HCOV OC43 RNA SPEC QL NAA+PROBE: NOT DETECTED
HCT VFR BLD AUTO: 46.5 % (ref 41.1–50.3)
HGB BLD-MCNC: 16 G/DL (ref 13.6–17.2)
HMPV RNA SPEC QL NAA+PROBE: NOT DETECTED
HPIV1 RNA SPEC QL NAA+PROBE: NOT DETECTED
HPIV2 RNA SPEC QL NAA+PROBE: NOT DETECTED
HPIV3 RNA SPEC QL NAA+PROBE: NOT DETECTED
HPIV4 RNA SPEC QL NAA+PROBE: NOT DETECTED
IMM GRANULOCYTES # BLD AUTO: 0.17 K/UL (ref 0–0.5)
IMM GRANULOCYTES NFR BLD AUTO: 1 % (ref 0–5)
LYMPHOCYTES # BLD: 0.95 K/UL (ref 0.5–4.6)
LYMPHOCYTES NFR BLD: 5.6 % (ref 13–44)
M PNEUMO DNA SPEC QL NAA+PROBE: NOT DETECTED
MCH RBC QN AUTO: 32.1 PG (ref 26.1–32.9)
MCHC RBC AUTO-ENTMCNC: 34.4 G/DL (ref 31.4–35)
MCV RBC AUTO: 93.4 FL (ref 82–102)
MONOCYTES # BLD: 1 K/UL (ref 0.1–1.3)
MONOCYTES NFR BLD: 5.9 % (ref 4–12)
NEUTS SEG # BLD: 14.8 K/UL (ref 1.7–8.2)
NEUTS SEG NFR BLD: 87.2 % (ref 43–78)
NRBC # BLD: 0 K/UL (ref 0–0.2)
NT PRO BNP: 376 PG/ML (ref 0–125)
O2/TOTAL GAS SETTING VFR VENT: 76 %
PCO2 BLD: 40.9 MMHG (ref 35–45)
PH BLD: 7.5 (ref 7.35–7.45)
PLATELET # BLD AUTO: 243 K/UL (ref 150–450)
PMV BLD AUTO: 9.8 FL (ref 9.4–12.3)
PO2 BLD: 63 MMHG (ref 75–100)
RBC # BLD AUTO: 4.98 M/UL (ref 4.23–5.6)
RSV RNA SPEC QL NAA+PROBE: NOT DETECTED
RV+EV RNA SPEC QL NAA+PROBE: NOT DETECTED
SAO2 % BLD: 93.3 % (ref 94–98)
SARS-COV-2 RNA RESP QL NAA+PROBE: NOT DETECTED
SERVICE CMNT-IMP: ABNORMAL
SERVICE CMNT-IMP: ABNORMAL
SPECIMEN TYPE: ABNORMAL
WBC # BLD AUTO: 17 K/UL (ref 4.3–11.1)

## 2025-04-18 PROCEDURE — 94640 AIRWAY INHALATION TREATMENT: CPT

## 2025-04-18 PROCEDURE — 6370000000 HC RX 637 (ALT 250 FOR IP): Performed by: INTERNAL MEDICINE

## 2025-04-18 PROCEDURE — 6360000002 HC RX W HCPCS: Performed by: NURSE PRACTITIONER

## 2025-04-18 PROCEDURE — 2500000003 HC RX 250 WO HCPCS: Performed by: NURSE PRACTITIONER

## 2025-04-18 PROCEDURE — 6360000002 HC RX W HCPCS: Performed by: INTERNAL MEDICINE

## 2025-04-18 PROCEDURE — 87070 CULTURE OTHR SPECIMN AEROBIC: CPT

## 2025-04-18 PROCEDURE — 2500000003 HC RX 250 WO HCPCS: Performed by: INTERNAL MEDICINE

## 2025-04-18 PROCEDURE — 36415 COLL VENOUS BLD VENIPUNCTURE: CPT

## 2025-04-18 PROCEDURE — 83880 ASSAY OF NATRIURETIC PEPTIDE: CPT

## 2025-04-18 PROCEDURE — 0202U NFCT DS 22 TRGT SARS-COV-2: CPT

## 2025-04-18 PROCEDURE — 36600 WITHDRAWAL OF ARTERIAL BLOOD: CPT

## 2025-04-18 PROCEDURE — 85025 COMPLETE CBC W/AUTO DIFF WBC: CPT

## 2025-04-18 PROCEDURE — 1100000000 HC RM PRIVATE

## 2025-04-18 PROCEDURE — 2700000000 HC OXYGEN THERAPY PER DAY

## 2025-04-18 PROCEDURE — 2580000003 HC RX 258: Performed by: STUDENT IN AN ORGANIZED HEALTH CARE EDUCATION/TRAINING PROGRAM

## 2025-04-18 PROCEDURE — 94761 N-INVAS EAR/PLS OXIMETRY MLT: CPT

## 2025-04-18 PROCEDURE — 82803 BLOOD GASES ANY COMBINATION: CPT

## 2025-04-18 PROCEDURE — 99232 SBSQ HOSP IP/OBS MODERATE 35: CPT | Performed by: INTERNAL MEDICINE

## 2025-04-18 PROCEDURE — 87205 SMEAR GRAM STAIN: CPT

## 2025-04-18 RX ADMIN — ENOXAPARIN SODIUM 40 MG: 100 INJECTION SUBCUTANEOUS at 17:21

## 2025-04-18 RX ADMIN — ATORVASTATIN CALCIUM 20 MG: 20 TABLET, FILM COATED ORAL at 09:24

## 2025-04-18 RX ADMIN — BUDESONIDE INHALATION 500 MCG: 0.5 SUSPENSION RESPIRATORY (INHALATION) at 19:49

## 2025-04-18 RX ADMIN — METHYLPREDNISOLONE SODIUM SUCCINATE 40 MG: 40 INJECTION, POWDER, LYOPHILIZED, FOR SOLUTION INTRAMUSCULAR; INTRAVENOUS at 21:09

## 2025-04-18 RX ADMIN — SODIUM CHLORIDE, PRESERVATIVE FREE 10 ML: 5 INJECTION INTRAVENOUS at 21:09

## 2025-04-18 RX ADMIN — FUROSEMIDE 40 MG: 10 INJECTION, SOLUTION INTRAMUSCULAR; INTRAVENOUS at 09:24

## 2025-04-18 RX ADMIN — PREDNISONE 40 MG: 20 TABLET ORAL at 09:24

## 2025-04-18 RX ADMIN — ARFORMOTEROL TARTRATE 15 MCG: 15 SOLUTION RESPIRATORY (INHALATION) at 19:49

## 2025-04-18 RX ADMIN — ARFORMOTEROL TARTRATE 15 MCG: 15 SOLUTION RESPIRATORY (INHALATION) at 07:11

## 2025-04-18 RX ADMIN — BUDESONIDE INHALATION 500 MCG: 0.5 SUSPENSION RESPIRATORY (INHALATION) at 07:11

## 2025-04-18 RX ADMIN — WATER 1000 MG: 1 INJECTION INTRAMUSCULAR; INTRAVENOUS; SUBCUTANEOUS at 15:30

## 2025-04-18 RX ADMIN — AZITHROMYCIN DIHYDRATE 500 MG: 250 TABLET ORAL at 15:30

## 2025-04-18 RX ADMIN — Medication 4 ML: at 07:11

## 2025-04-18 RX ADMIN — ALBUTEROL SULFATE 2.5 MG: 2.5 SOLUTION RESPIRATORY (INHALATION) at 11:24

## 2025-04-18 RX ADMIN — SODIUM CHLORIDE, PRESERVATIVE FREE 5 ML: 5 INJECTION INTRAVENOUS at 09:25

## 2025-04-18 RX ADMIN — ALBUTEROL SULFATE 2.5 MG: 2.5 SOLUTION RESPIRATORY (INHALATION) at 19:49

## 2025-04-18 RX ADMIN — Medication 4 ML: at 19:49

## 2025-04-18 RX ADMIN — ALBUTEROL SULFATE 2.5 MG: 2.5 SOLUTION RESPIRATORY (INHALATION) at 07:11

## 2025-04-18 RX ADMIN — ASPIRIN 81 MG: 81 TABLET ORAL at 09:24

## 2025-04-18 RX ADMIN — METHYLPREDNISOLONE SODIUM SUCCINATE 40 MG: 40 INJECTION, POWDER, LYOPHILIZED, FOR SOLUTION INTRAMUSCULAR; INTRAVENOUS at 15:30

## 2025-04-18 RX ADMIN — ALBUTEROL SULFATE 2.5 MG: 2.5 SOLUTION RESPIRATORY (INHALATION) at 15:14

## 2025-04-18 NOTE — CARE COORDINATION
MSN, CM:  patient continues on 60/75 Airvo.  Patient has no outpatient needs per PT/OT.  Patient may need home oxygen when medically stable for discharge.  Case Management will continue to follow.

## 2025-04-18 NOTE — PROGRESS NOTES
Hospitalist Progress Note   Admit Date:  2025  3:14 PM   Name:  Robson Cox   Age:  64 y.o.  Sex:  male  :  1960   MRN:  [id number]   Room:  3/    Presenting/Chief Complaint: No chief complaint on file.     Reason(s) for Admission: Acute hypoxic respiratory failure [J96.01]  Acute hypoxemic respiratory failure [J96.01]     Hospital Course:    64 y.o. male with history of allergic rhinitis, COPD, hypertension who presented to his pulmonologist due to reporting feeling ill since .  Patient has history of pulmonary nodules and moderately severe chronic obstructive pulmonary disease managed with Trelegy and albuterol.  Patient denies any sick contacts.  Has associated coughing, sneezing just been unchanged over the last 5 days.  Patient adheres to his Trelegy regimen and albuterol.  Patient was hypoxemic 67% and required 6 L to get back to 90%.       Subjective & 24hr Events:   Patient was seen and examined bedside.  No overnight events.  Patient currently on Airvo this morning.  No new complaints from patient.      Assessment & Plan:   Acute hypoxemic respiratory failure  - Patient with continued hypoxia.   - Continue wean oxygen as tolerated  - Patient started on prophylactic treatment for pneumonia and COPD exacerbation  - Pulmonology following  Patient currently on Airvo 60 L 35% sats 92%.  - CT PE results as below.     Pulmonary emphysema  - Walk test prior to discharge     Pneumonia of both lower lobes due to infectious organism  - Continue Rocephin azithromycin  - Sputum culture     COPD  - Prednisone x 5 days  - DuoNebs  - Continue wean oxygen as tolerated  - Antibiotics as above  - CT PE negative for pulmonary embolism.  Evidence of severe emphysema.  Left lower lobe pneumonia.    PT/OT evals ordered?  Not ordered; patient not expected to need rehab  Diet:  ADULT DIET; Regular  VTE prophylaxis: Lovenox  Code status: Full Code      Non-peripheral Lines and Tubes (if present):  98 - 107 mmol/L    CO2 24 20 - 29 mmol/L    Anion Gap 11 7 - 16 mmol/L    Glucose 140 (H) 70 - 99 mg/dL    BUN 24 (H) 8 - 23 MG/DL    Creatinine 0.92 0.80 - 1.30 MG/DL    Est, Glom Filt Rate >90 >60 ml/min/1.73m2    Calcium 8.9 8.8 - 10.2 MG/DL   CBC with Auto Differential    Collection Time: 04/17/25  3:34 AM   Result Value Ref Range    WBC 9.9 4.3 - 11.1 K/uL    RBC 5.26 4.23 - 5.6 M/uL    Hemoglobin 16.7 13.6 - 17.2 g/dL    Hematocrit 48.9 41.1 - 50.3 %    MCV 93.0 82 - 102 FL    MCH 31.7 26.1 - 32.9 PG    MCHC 34.2 31.4 - 35.0 g/dL    RDW 13.2 11.9 - 14.6 %    Platelets 201 150 - 450 K/uL    MPV 9.3 (L) 9.4 - 12.3 FL    nRBC 0.00 0.0 - 0.2 K/uL    Differential Type AUTOMATED      Neutrophils % 91.1 (H) 43.0 - 78.0 %    Lymphocytes % 5.9 (L) 13.0 - 44.0 %    Monocytes % 2.0 (L) 4.0 - 12.0 %    Eosinophils % 0.0 (L) 0.5 - 7.8 %    Basophils % 0.1 0.0 - 2.0 %    Immature Granulocytes % 0.9 0.0 - 5.0 %    Neutrophils Absolute 9.04 (H) 1.70 - 8.20 K/UL    Lymphocytes Absolute 0.59 0.50 - 4.60 K/UL    Monocytes Absolute 0.20 0.10 - 1.30 K/UL    Eosinophils Absolute 0.00 0.00 - 0.80 K/UL    Basophils Absolute 0.01 0.00 - 0.20 K/UL    Immature Granulocytes Absolute 0.09 0.0 - 0.5 K/UL   CBC with Auto Differential    Collection Time: 04/18/25  6:50 AM   Result Value Ref Range    WBC 17.0 (H) 4.3 - 11.1 K/uL    RBC 4.98 4.23 - 5.6 M/uL    Hemoglobin 16.0 13.6 - 17.2 g/dL    Hematocrit 46.5 41.1 - 50.3 %    MCV 93.4 82 - 102 FL    MCH 32.1 26.1 - 32.9 PG    MCHC 34.4 31.4 - 35.0 g/dL    RDW 13.1 11.9 - 14.6 %    Platelets 243 150 - 450 K/uL    MPV 9.8 9.4 - 12.3 FL    nRBC 0.00 0.0 - 0.2 K/uL    Differential Type AUTOMATED      Neutrophils % 87.2 (H) 43.0 - 78.0 %    Lymphocytes % 5.6 (L) 13.0 - 44.0 %    Monocytes % 5.9 4.0 - 12.0 %    Eosinophils % 0.1 (L) 0.5 - 7.8 %    Basophils % 0.2 0.0 - 2.0 %    Immature Granulocytes % 1.0 0.0 - 5.0 %    Neutrophils Absolute 14.80 (H) 1.70 - 8.20 K/UL    Lymphocytes Absolute 0.95

## 2025-04-18 NOTE — PLAN OF CARE
Problem: Discharge Planning  Goal: Discharge to home or other facility with appropriate resources  4/18/2025 1047 by Narcisa Tyson, RN  Outcome: Progressing  4/17/2025 2228 by Lucinda Love, RN  Outcome: Progressing

## 2025-04-18 NOTE — FLOWSHEET NOTE
04/18/25 0719   Handoff   Communication Given Shift Handoff   Handoff Received From HUGH Jane   Handoff Communication Face to Face   Time Handoff Given 0720     Patient awake, alert on AirVo 60L, 75% with no distress noted. SR up x2, bed low locked with call light within reach. Encouraged to call for needs.

## 2025-04-18 NOTE — PROGRESS NOTES
Robson Cox  Admission Date: 4/16/2025         Daily Progress Note: 4/18/2025    The patient's chart is reviewed and the patient is discussed with the staff.    Background:   64-year-old male reporting illness and confinement to bed since April 11, 2025. He describes thoracic pain and worsening dyspnea, initially manifesting with symptoms resembling allergic reactions. History of pulmonary nodules, moderately severe chronic obstructive pulmonary disease (COPD) with severe emphysema managed with Trelegy and albuterol.  Quit smoking in 2023. Imaging revealed acute bilateral pulmonary infiltrates on  film.    The onset of symptoms occurred last week following a rib contusion. The patient experienced significant hypertension subsequent to an episode of anger directed at a coworker. He reported sensations of hot and cold without pyrexia. He does not use supplemental oxygen.  no sick contacts and resides with his healthy wife. There have been no recent procedures, surgeries, hospital admissions, or travel. The patient reports persistent pain associated with coughing or sneezing, which has remained unchanged over the past 4-5 days. He experiences thoracic pain during coughing and expectorates a small amount of sputum. Initially, he self-medicated with acetaminophen and began using guaifenesin (Mucinex) upon the involvement of his chest.     He was hypoxemic to 67% and needed 6L to get to 90%. He otherwise appears clinically stable.     Subjective:     Now on airvo 60L, 75%, sats 90-93%.  States feeling better and breathing seems a bit easier.  Still no cough or sputum.  Denies chest discomfort, swelling.  No fevers, night sweats.  States airvo placed bc the NC would not stay in place.    Current Facility-Administered Medications   Medication Dose Route Frequency    sodium chloride (Inhalant) 3 % nebulizer solution 4 mL  4 mL Nebulization BID    furosemide (LASIX) injection 40 mg  40 mg  and now on 12L, sats 90-93%.  Will check CTA chest  --sputum cultures ordered, not collected yet. Negative RVP.  Add 3% to assist with any sputum production.      Chronic obstructive pulmonary disease with acute lower respiratory infection  Plan: continue nebs.  2022 with FEV1 53% and DLCO 30%      More than 50% of the time documented was spent in face-to-face contact with the patient and in the care of the patient on the floor/unit where the patient is located.    In this split/shared evaluation I performed performed a medically appropriate history and exam, counseled and educated the patient and/or family member, ordered medications, tests or procedures, documented information in EMR, and coordinated care. which accounted for 14 minutes of clinical time.     Maryuri Katz APRN - CNP       ATTENDING ADDENDUM:    In this split/shared evaluation I performed reviewed the patients's H&P, available images, labs, cultures., performed a medically appropriate history and exam, documented information in EMR, independently interpreted images, and coordinated care. which accounted for 18 minutes clinical time.     Impression: severe bullous emphysema with b/l infiltrates left > right. On airvo at 90% and 60 LPM. Continue abx. He is aware given severe bullous emphysema will take a while to get better. Continue remaining tx.     Jose Dyer MD

## 2025-04-19 LAB
ANION GAP SERPL CALC-SCNC: 11 MMOL/L (ref 7–16)
BASOPHILS # BLD: 0.03 K/UL (ref 0–0.2)
BASOPHILS NFR BLD: 0.2 % (ref 0–2)
BUN SERPL-MCNC: 25 MG/DL (ref 8–23)
CALCIUM SERPL-MCNC: 9.4 MG/DL (ref 8.8–10.2)
CHLORIDE SERPL-SCNC: 99 MMOL/L (ref 98–107)
CO2 SERPL-SCNC: 30 MMOL/L (ref 20–29)
CREAT SERPL-MCNC: 0.96 MG/DL (ref 0.8–1.3)
DIFFERENTIAL METHOD BLD: ABNORMAL
EOSINOPHIL # BLD: 0 K/UL (ref 0–0.8)
EOSINOPHIL NFR BLD: 0 % (ref 0.5–7.8)
ERYTHROCYTE [DISTWIDTH] IN BLOOD BY AUTOMATED COUNT: 13.2 % (ref 11.9–14.6)
GLUCOSE SERPL-MCNC: 178 MG/DL (ref 70–99)
HCT VFR BLD AUTO: 49.7 % (ref 41.1–50.3)
HGB BLD-MCNC: 17 G/DL (ref 13.6–17.2)
IMM GRANULOCYTES # BLD AUTO: 0.19 K/UL (ref 0–0.5)
IMM GRANULOCYTES NFR BLD AUTO: 1 % (ref 0–5)
LYMPHOCYTES # BLD: 0.52 K/UL (ref 0.5–4.6)
LYMPHOCYTES NFR BLD: 2.6 % (ref 13–44)
MCH RBC QN AUTO: 32.3 PG (ref 26.1–32.9)
MCHC RBC AUTO-ENTMCNC: 34.2 G/DL (ref 31.4–35)
MCV RBC AUTO: 94.5 FL (ref 82–102)
MONOCYTES # BLD: 0.68 K/UL (ref 0.1–1.3)
MONOCYTES NFR BLD: 3.5 % (ref 4–12)
NEUTS SEG # BLD: 18.22 K/UL (ref 1.7–8.2)
NEUTS SEG NFR BLD: 92.7 % (ref 43–78)
NRBC # BLD: 0 K/UL (ref 0–0.2)
PLATELET # BLD AUTO: 263 K/UL (ref 150–450)
PMV BLD AUTO: 9 FL (ref 9.4–12.3)
POTASSIUM SERPL-SCNC: 3.9 MMOL/L (ref 3.5–5.1)
RBC # BLD AUTO: 5.26 M/UL (ref 4.23–5.6)
SODIUM SERPL-SCNC: 140 MMOL/L (ref 136–145)
WBC # BLD AUTO: 19.6 K/UL (ref 4.3–11.1)

## 2025-04-19 PROCEDURE — 2500000003 HC RX 250 WO HCPCS: Performed by: INTERNAL MEDICINE

## 2025-04-19 PROCEDURE — 85025 COMPLETE CBC W/AUTO DIFF WBC: CPT

## 2025-04-19 PROCEDURE — 1100000000 HC RM PRIVATE

## 2025-04-19 PROCEDURE — 2580000003 HC RX 258: Performed by: STUDENT IN AN ORGANIZED HEALTH CARE EDUCATION/TRAINING PROGRAM

## 2025-04-19 PROCEDURE — 6360000002 HC RX W HCPCS: Performed by: NURSE PRACTITIONER

## 2025-04-19 PROCEDURE — 94640 AIRWAY INHALATION TREATMENT: CPT

## 2025-04-19 PROCEDURE — 6370000000 HC RX 637 (ALT 250 FOR IP): Performed by: INTERNAL MEDICINE

## 2025-04-19 PROCEDURE — 2500000003 HC RX 250 WO HCPCS: Performed by: NURSE PRACTITIONER

## 2025-04-19 PROCEDURE — 36415 COLL VENOUS BLD VENIPUNCTURE: CPT

## 2025-04-19 PROCEDURE — 6360000002 HC RX W HCPCS: Performed by: INTERNAL MEDICINE

## 2025-04-19 PROCEDURE — 94761 N-INVAS EAR/PLS OXIMETRY MLT: CPT

## 2025-04-19 PROCEDURE — 99232 SBSQ HOSP IP/OBS MODERATE 35: CPT | Performed by: INTERNAL MEDICINE

## 2025-04-19 PROCEDURE — 80048 BASIC METABOLIC PNL TOTAL CA: CPT

## 2025-04-19 PROCEDURE — 2700000000 HC OXYGEN THERAPY PER DAY

## 2025-04-19 RX ADMIN — SODIUM CHLORIDE, PRESERVATIVE FREE 10 ML: 5 INJECTION INTRAVENOUS at 09:16

## 2025-04-19 RX ADMIN — Medication 4 ML: at 19:16

## 2025-04-19 RX ADMIN — ARFORMOTEROL TARTRATE 15 MCG: 15 SOLUTION RESPIRATORY (INHALATION) at 19:17

## 2025-04-19 RX ADMIN — METHYLPREDNISOLONE SODIUM SUCCINATE 40 MG: 40 INJECTION, POWDER, LYOPHILIZED, FOR SOLUTION INTRAMUSCULAR; INTRAVENOUS at 20:47

## 2025-04-19 RX ADMIN — BUDESONIDE INHALATION 500 MCG: 0.5 SUSPENSION RESPIRATORY (INHALATION) at 19:17

## 2025-04-19 RX ADMIN — WATER 1000 MG: 1 INJECTION INTRAMUSCULAR; INTRAVENOUS; SUBCUTANEOUS at 15:08

## 2025-04-19 RX ADMIN — SODIUM CHLORIDE, PRESERVATIVE FREE 10 ML: 5 INJECTION INTRAVENOUS at 20:47

## 2025-04-19 RX ADMIN — ALBUTEROL SULFATE 2.5 MG: 2.5 SOLUTION RESPIRATORY (INHALATION) at 07:05

## 2025-04-19 RX ADMIN — ALBUTEROL SULFATE 2.5 MG: 2.5 SOLUTION RESPIRATORY (INHALATION) at 15:02

## 2025-04-19 RX ADMIN — ENOXAPARIN SODIUM 40 MG: 100 INJECTION SUBCUTANEOUS at 16:55

## 2025-04-19 RX ADMIN — ALBUTEROL SULFATE 2.5 MG: 2.5 SOLUTION RESPIRATORY (INHALATION) at 19:17

## 2025-04-19 RX ADMIN — ALBUTEROL SULFATE 2.5 MG: 2.5 SOLUTION RESPIRATORY (INHALATION) at 11:17

## 2025-04-19 RX ADMIN — METHYLPREDNISOLONE SODIUM SUCCINATE 40 MG: 40 INJECTION, POWDER, LYOPHILIZED, FOR SOLUTION INTRAMUSCULAR; INTRAVENOUS at 05:19

## 2025-04-19 RX ADMIN — BUTALBITAL, ACETAMINOPHEN, AND CAFFEINE 1 TABLET: 50; 325; 40 TABLET ORAL at 16:54

## 2025-04-19 RX ADMIN — Medication 4 ML: at 07:05

## 2025-04-19 RX ADMIN — ARFORMOTEROL TARTRATE 15 MCG: 15 SOLUTION RESPIRATORY (INHALATION) at 07:05

## 2025-04-19 RX ADMIN — ASPIRIN 81 MG: 81 TABLET ORAL at 09:15

## 2025-04-19 RX ADMIN — METHYLPREDNISOLONE SODIUM SUCCINATE 40 MG: 40 INJECTION, POWDER, LYOPHILIZED, FOR SOLUTION INTRAMUSCULAR; INTRAVENOUS at 15:07

## 2025-04-19 RX ADMIN — FUROSEMIDE 40 MG: 10 INJECTION, SOLUTION INTRAMUSCULAR; INTRAVENOUS at 09:15

## 2025-04-19 RX ADMIN — ATORVASTATIN CALCIUM 20 MG: 20 TABLET, FILM COATED ORAL at 09:15

## 2025-04-19 RX ADMIN — BUDESONIDE INHALATION 500 MCG: 0.5 SUSPENSION RESPIRATORY (INHALATION) at 07:05

## 2025-04-19 ASSESSMENT — PAIN SCALES - GENERAL
PAINLEVEL_OUTOF10: 0
PAINLEVEL_OUTOF10: 5

## 2025-04-19 ASSESSMENT — PAIN DESCRIPTION - LOCATION: LOCATION: HEAD

## 2025-04-19 ASSESSMENT — PAIN DESCRIPTION - DESCRIPTORS: DESCRIPTORS: ACHING

## 2025-04-19 NOTE — PROGRESS NOTES
Hospitalist Progress Note   Admit Date:  2025  3:14 PM   Name:  Robson Cox   Age:  64 y.o.  Sex:  male  :  1960   MRN:  [id number]   Room:  803/    Presenting/Chief Complaint: No chief complaint on file.     Reason(s) for Admission: Acute hypoxic respiratory failure [J96.01]  Acute hypoxemic respiratory failure [J96.01]     Hospital Course:    64 y.o. male with history of allergic rhinitis, COPD, hypertension who presented to his pulmonologist due to reporting feeling ill since .  Patient has history of pulmonary nodules and moderately severe chronic obstructive pulmonary disease managed with Trelegy and albuterol.  Patient denies any sick contacts.  Has associated coughing, sneezing just been unchanged over the last 5 days.  Patient adheres to his Trelegy regimen and albuterol.  Patient was hypoxemic 67% and required 6 L to get back to 90%.     Subjective & 24hr Events:   Patient was seen and examined at bedside.  No overnight events.  Patient remains on Airvo.  Patient does not look like in any respiratory distress.  Patient states he feels well with no further new complaints.    Assessment & Plan:   Acute hypoxemic respiratory failure  - Patient with continued hypoxia.   - Continue wean oxygen as tolerated  - Patient started on prophylactic treatment for pneumonia and COPD exacerbation  - Pulmonology following  - CT PE results as below.  -  patient remains on Airvo FiO2 90 satting at 94%.  - Will continue to wean Airvo as tolerated  - Patient's status post 3-day course of azithromycin.  Continue Rocephin for 5 days total  - Continue Solu-Medrol 40 mg every 8 hour IV     Pulmonary emphysema  - Walk test prior to discharge     Pneumonia of both lower lobes due to infectious organism  - Continue Rocephin azithromycin  - Sputum culture     COPD  - DuoNebs  - Continue wean oxygen as tolerated  - Antibiotics as above  - CT PE negative for pulmonary embolism.  Evidence of severe

## 2025-04-19 NOTE — PROGRESS NOTES
Robson Cox  Admission Date: 4/16/2025         Daily Progress Note: 4/19/2025    The patient's chart is reviewed and the patient is discussed with the staff.    Background:   64-year-old male reporting illness and confinement to bed since April 11, 2025. He describes thoracic pain and worsening dyspnea, initially manifesting with symptoms resembling allergic reactions. History of pulmonary nodules, moderately severe chronic obstructive pulmonary disease (COPD) with severe emphysema managed with Trelegy and albuterol.  Quit smoking in 2023. Imaging revealed acute bilateral pulmonary infiltrates on  film.    The onset of symptoms occurred last week following a rib contusion. The patient experienced significant hypertension subsequent to an episode of anger directed at a coworker. He reported sensations of hot and cold without pyrexia. He does not use supplemental oxygen.  no sick contacts and resides with his healthy wife. There have been no recent procedures, surgeries, hospital admissions, or travel. The patient reports persistent pain associated with coughing or sneezing, which has remained unchanged over the past 4-5 days. He experiences thoracic pain during coughing and expectorates a small amount of sputum. Initially, he self-medicated with acetaminophen and began using guaifenesin (Mucinex) upon the involvement of his chest.     He was hypoxemic to 67% and needed 6L to get to 90%. He otherwise appears clinically stable.     Subjective:     Now on airvo 60L, 80%, sats 94-98%. He is sitting up in bed. States that his breathing is much better today than yesterday. Reports he did cough up small amount of sputum for collection yesterday but that's all. Denies any fevers or night sweats.      Current Facility-Administered Medications   Medication Dose Route Frequency    methylPREDNISolone sodium succ (SOLU-MEDROL) 40 mg in sterile water 1 mL injection  40 mg IntraVENous Q8H    sodium  chloride (Inhalant) 3 % nebulizer solution 4 mL  4 mL Nebulization BID    furosemide (LASIX) injection 40 mg  40 mg IntraVENous Daily    aspirin EC tablet 81 mg  81 mg Oral Daily    atorvastatin (LIPITOR) tablet 20 mg  20 mg Oral Daily    butalbital-acetaminophen-caffeine (FIORICET, ESGIC) per tablet 1 tablet  1 tablet Oral Q4H PRN    sodium chloride flush 0.9 % injection 5-40 mL  5-40 mL IntraVENous 2 times per day    sodium chloride flush 0.9 % injection 5-40 mL  5-40 mL IntraVENous PRN    0.9 % sodium chloride infusion   IntraVENous PRN    enoxaparin (LOVENOX) injection 40 mg  40 mg SubCUTAneous QPM    ondansetron (ZOFRAN-ODT) disintegrating tablet 4 mg  4 mg Oral Q8H PRN    Or    ondansetron (ZOFRAN) injection 4 mg  4 mg IntraVENous Q6H PRN    polyethylene glycol (GLYCOLAX) packet 17 g  17 g Oral Daily PRN    acetaminophen (TYLENOL) tablet 650 mg  650 mg Oral Q6H PRN    Or    acetaminophen (TYLENOL) suppository 650 mg  650 mg Rectal Q6H PRN    albuterol (PROVENTIL) (2.5 MG/3ML) 0.083% nebulizer solution 2.5 mg  2.5 mg Nebulization Q4H WA RT    cefTRIAXone (ROCEPHIN) 1,000 mg in sterile water 10 mL IV syringe  1,000 mg IntraVENous Q24H    arformoterol tartrate (BROVANA) nebulizer solution 15 mcg  15 mcg Nebulization BID RT    budesonide (PULMICORT) nebulizer suspension 500 mcg  0.5 mg Nebulization BID RT     Review of Systems: Comprehensive ROS negative except in HPI  Objective:   Blood pressure 121/82, pulse 66, temperature 97.7 °F (36.5 °C), resp. rate 16, height 1.753 m (5' 9\"), weight 82 kg (180 lb 12.4 oz), SpO2 94%.   Intake/Output Summary (Last 24 hours) at 4/19/2025 1123  Last data filed at 4/19/2025 0642  Gross per 24 hour   Intake 360 ml   Output 1900 ml   Net -1540 ml     Physical Exam:   Constitutional:  the patient is well developed and in no acute distress  EENMT:  Sclera clear, pupils equal, oral mucosa moist  Respiratory: symmetric chest rise. BLS diminished on airvo.  Cardiovascular:  RRR without

## 2025-04-19 NOTE — FLOWSHEET NOTE
04/19/25 0717   Handoff   Communication Given Shift Handoff   Handoff Received From HUGH Jane   Handoff Communication Face to Face   Time Handoff Given 0717     Awake, resting in bed with no distress on AirVo 60L, 90%. SR up x2, bed low locked with call light within reach.

## 2025-04-19 NOTE — PLAN OF CARE
Problem: Discharge Planning  Goal: Discharge to home or other facility with appropriate resources  4/19/2025 1034 by Narcisa Tyson, RN  Outcome: Progressing  4/18/2025 2128 by Lucinda Love, RN  Outcome: Progressing

## 2025-04-20 LAB
ANION GAP SERPL CALC-SCNC: 13 MMOL/L (ref 7–16)
BACTERIA SPEC CULT: NORMAL
BASOPHILS # BLD: 0.02 K/UL (ref 0–0.2)
BASOPHILS NFR BLD: 0.1 % (ref 0–2)
BUN SERPL-MCNC: 27 MG/DL (ref 8–23)
CALCIUM SERPL-MCNC: 9.3 MG/DL (ref 8.8–10.2)
CHLORIDE SERPL-SCNC: 97 MMOL/L (ref 98–107)
CO2 SERPL-SCNC: 31 MMOL/L (ref 20–29)
CREAT SERPL-MCNC: 1.01 MG/DL (ref 0.8–1.3)
DIFFERENTIAL METHOD BLD: ABNORMAL
EOSINOPHIL # BLD: 0 K/UL (ref 0–0.8)
EOSINOPHIL NFR BLD: 0 % (ref 0.5–7.8)
ERYTHROCYTE [DISTWIDTH] IN BLOOD BY AUTOMATED COUNT: 13.2 % (ref 11.9–14.6)
GLUCOSE SERPL-MCNC: 202 MG/DL (ref 70–99)
GRAM STN SPEC: NORMAL
HCT VFR BLD AUTO: 49.5 % (ref 41.1–50.3)
HGB BLD-MCNC: 16.9 G/DL (ref 13.6–17.2)
IMM GRANULOCYTES # BLD AUTO: 0.2 K/UL (ref 0–0.5)
IMM GRANULOCYTES NFR BLD AUTO: 1.1 % (ref 0–5)
LYMPHOCYTES # BLD: 0.55 K/UL (ref 0.5–4.6)
LYMPHOCYTES NFR BLD: 3 % (ref 13–44)
MCH RBC QN AUTO: 32.6 PG (ref 26.1–32.9)
MCHC RBC AUTO-ENTMCNC: 34.1 G/DL (ref 31.4–35)
MCV RBC AUTO: 95.6 FL (ref 82–102)
MONOCYTES # BLD: 0.75 K/UL (ref 0.1–1.3)
MONOCYTES NFR BLD: 4.1 % (ref 4–12)
NEUTS SEG # BLD: 16.95 K/UL (ref 1.7–8.2)
NEUTS SEG NFR BLD: 91.7 % (ref 43–78)
NRBC # BLD: 0 K/UL (ref 0–0.2)
PLATELET # BLD AUTO: 276 K/UL (ref 150–450)
PMV BLD AUTO: 9.2 FL (ref 9.4–12.3)
POTASSIUM SERPL-SCNC: 4 MMOL/L (ref 3.5–5.1)
RBC # BLD AUTO: 5.18 M/UL (ref 4.23–5.6)
SERVICE CMNT-IMP: NORMAL
SODIUM SERPL-SCNC: 140 MMOL/L (ref 136–145)
WBC # BLD AUTO: 18.5 K/UL (ref 4.3–11.1)

## 2025-04-20 PROCEDURE — 85025 COMPLETE CBC W/AUTO DIFF WBC: CPT

## 2025-04-20 PROCEDURE — 36415 COLL VENOUS BLD VENIPUNCTURE: CPT

## 2025-04-20 PROCEDURE — 99232 SBSQ HOSP IP/OBS MODERATE 35: CPT | Performed by: INTERNAL MEDICINE

## 2025-04-20 PROCEDURE — 6360000002 HC RX W HCPCS: Performed by: INTERNAL MEDICINE

## 2025-04-20 PROCEDURE — 1100000000 HC RM PRIVATE

## 2025-04-20 PROCEDURE — 2700000000 HC OXYGEN THERAPY PER DAY

## 2025-04-20 PROCEDURE — 94761 N-INVAS EAR/PLS OXIMETRY MLT: CPT

## 2025-04-20 PROCEDURE — 94640 AIRWAY INHALATION TREATMENT: CPT

## 2025-04-20 PROCEDURE — 6370000000 HC RX 637 (ALT 250 FOR IP): Performed by: INTERNAL MEDICINE

## 2025-04-20 PROCEDURE — 2500000003 HC RX 250 WO HCPCS: Performed by: INTERNAL MEDICINE

## 2025-04-20 PROCEDURE — 2580000003 HC RX 258: Performed by: STUDENT IN AN ORGANIZED HEALTH CARE EDUCATION/TRAINING PROGRAM

## 2025-04-20 PROCEDURE — 2500000003 HC RX 250 WO HCPCS: Performed by: NURSE PRACTITIONER

## 2025-04-20 PROCEDURE — 6360000002 HC RX W HCPCS: Performed by: NURSE PRACTITIONER

## 2025-04-20 PROCEDURE — 80048 BASIC METABOLIC PNL TOTAL CA: CPT

## 2025-04-20 RX ADMIN — FUROSEMIDE 40 MG: 10 INJECTION, SOLUTION INTRAMUSCULAR; INTRAVENOUS at 09:54

## 2025-04-20 RX ADMIN — WATER 1000 MG: 1 INJECTION INTRAMUSCULAR; INTRAVENOUS; SUBCUTANEOUS at 16:20

## 2025-04-20 RX ADMIN — ATORVASTATIN CALCIUM 20 MG: 20 TABLET, FILM COATED ORAL at 09:54

## 2025-04-20 RX ADMIN — BUDESONIDE INHALATION 500 MCG: 0.5 SUSPENSION RESPIRATORY (INHALATION) at 07:07

## 2025-04-20 RX ADMIN — ENOXAPARIN SODIUM 40 MG: 100 INJECTION SUBCUTANEOUS at 18:10

## 2025-04-20 RX ADMIN — METHYLPREDNISOLONE SODIUM SUCCINATE 40 MG: 40 INJECTION, POWDER, LYOPHILIZED, FOR SOLUTION INTRAMUSCULAR; INTRAVENOUS at 21:02

## 2025-04-20 RX ADMIN — ALBUTEROL SULFATE 2.5 MG: 2.5 SOLUTION RESPIRATORY (INHALATION) at 15:14

## 2025-04-20 RX ADMIN — ALBUTEROL SULFATE 2.5 MG: 2.5 SOLUTION RESPIRATORY (INHALATION) at 19:52

## 2025-04-20 RX ADMIN — ALBUTEROL SULFATE 2.5 MG: 2.5 SOLUTION RESPIRATORY (INHALATION) at 07:07

## 2025-04-20 RX ADMIN — METHYLPREDNISOLONE SODIUM SUCCINATE 40 MG: 40 INJECTION, POWDER, LYOPHILIZED, FOR SOLUTION INTRAMUSCULAR; INTRAVENOUS at 14:14

## 2025-04-20 RX ADMIN — METHYLPREDNISOLONE SODIUM SUCCINATE 40 MG: 40 INJECTION, POWDER, LYOPHILIZED, FOR SOLUTION INTRAMUSCULAR; INTRAVENOUS at 05:04

## 2025-04-20 RX ADMIN — ASPIRIN 81 MG: 81 TABLET ORAL at 09:54

## 2025-04-20 RX ADMIN — Medication 4 ML: at 07:07

## 2025-04-20 RX ADMIN — ARFORMOTEROL TARTRATE 15 MCG: 15 SOLUTION RESPIRATORY (INHALATION) at 07:07

## 2025-04-20 RX ADMIN — BUTALBITAL, ACETAMINOPHEN, AND CAFFEINE 1 TABLET: 50; 325; 40 TABLET ORAL at 19:25

## 2025-04-20 RX ADMIN — SODIUM CHLORIDE, PRESERVATIVE FREE 10 ML: 5 INJECTION INTRAVENOUS at 09:54

## 2025-04-20 RX ADMIN — Medication 4 ML: at 19:52

## 2025-04-20 RX ADMIN — BUDESONIDE INHALATION 500 MCG: 0.5 SUSPENSION RESPIRATORY (INHALATION) at 19:52

## 2025-04-20 RX ADMIN — ALBUTEROL SULFATE 2.5 MG: 2.5 SOLUTION RESPIRATORY (INHALATION) at 11:02

## 2025-04-20 RX ADMIN — ARFORMOTEROL TARTRATE 15 MCG: 15 SOLUTION RESPIRATORY (INHALATION) at 19:52

## 2025-04-20 RX ADMIN — SODIUM CHLORIDE, PRESERVATIVE FREE 10 ML: 5 INJECTION INTRAVENOUS at 21:22

## 2025-04-20 ASSESSMENT — PAIN SCALES - GENERAL: PAINLEVEL_OUTOF10: 7

## 2025-04-20 ASSESSMENT — PAIN DESCRIPTION - LOCATION: LOCATION: HEAD

## 2025-04-20 ASSESSMENT — PAIN SCALES - WONG BAKER: WONGBAKER_NUMERICALRESPONSE: NO HURT

## 2025-04-20 ASSESSMENT — PAIN DESCRIPTION - DESCRIPTORS: DESCRIPTORS: ACHING

## 2025-04-20 NOTE — PROGRESS NOTES
Robson Cox  Admission Date: 4/16/2025         Daily Progress Note: 4/20/2025    The patient's chart is reviewed and the patient is discussed with the staff.    Background:   64-year-old male reporting illness and confinement to bed since April 11, 2025. He describes thoracic pain and worsening dyspnea, initially manifesting with symptoms resembling allergic reactions. History of pulmonary nodules, moderately severe chronic obstructive pulmonary disease (COPD) with severe emphysema managed with Trelegy and albuterol.  Quit smoking in 2023. Imaging revealed acute bilateral pulmonary infiltrates on  film.    The onset of symptoms occurred last week following a rib contusion. The patient experienced significant hypertension subsequent to an episode of anger directed at a coworker. He reported sensations of hot and cold without pyrexia. He does not use supplemental oxygen.  no sick contacts and resides with his healthy wife. There have been no recent procedures, surgeries, hospital admissions, or travel. The patient reports persistent pain associated with coughing or sneezing, which has remained unchanged over the past 4-5 days. He experiences thoracic pain during coughing and expectorates a small amount of sputum. Initially, he self-medicated with acetaminophen and began using guaifenesin (Mucinex) upon the involvement of his chest.     He was hypoxemic to 67% and needed 6L to get to 90%. He otherwise appears clinically stable.     Subjective:     Currenlty on airvo 60L, 80%, sats 94-98%. He is sitting up in bed. States that he feels very good, \"probably the best he has felt since being in hospital\". He is eager to get Oxygen decreased. I will call respiratory to get them to see how he does on lower oxygen today.       Current Facility-Administered Medications   Medication Dose Route Frequency    methylPREDNISolone sodium succ (SOLU-MEDROL) 40 mg in sterile water 1 mL injection  40 mg  diminished on airvo.  Cardiovascular:  RRR without M,G,R. There is no lower extremity edema.  Gastrointestinal: soft and non-tender; with positive bowel sounds.  Musculoskeletal: warm without cyanosis. Normal muscle tone. +clubbing  Skin:  no jaundice or rashes, no wounds   Neurologic: symmetric strength, fluent speech  Psychiatric:  calm, appropriate, oriented x 4    Imaging: I performed an independent interpretation of the patient's images.  CXR: 04/17      CT Chest: 11/15/24      CT Chest: 04/17    LAB:  Recent Labs     04/18/25  0650 04/19/25  0816 04/20/25  0444   WBC 17.0* 19.6* 18.5*   HGB 16.0 17.0 16.9   HCT 46.5 49.7 49.5    263 276     Recent Labs     04/19/25  0816 04/20/25  0444    140   K 3.9 4.0   CL 99 97*   CO2 30* 31*   BUN 25* 27*   CREATININE 0.96 1.01     No results for input(s): \"TROPHS\", \"NTPROBNP\", \"CRP\", \"ESR\" in the last 72 hours.  Recent Labs     04/19/25  0816 04/20/25  0444   GLUCOSE 178* 202*      Microbiology:   Recent Labs     04/18/25  1537   CULTURE LIGHT NORMAL RESPIRATORY GIOVANNI     Recent Labs     04/18/25  0935   COVID19 NOT DETECTED     ECHO: No results found for this or any previous visit.    Assessment and Plan:  (Medical Decision Making)   Impression: 63 y/o male with shortness of breath, cough and acutely ill for 4 days with acute hypoxemia, likely pneumonia.     Principal Problem:    Acute hypoxemic respiratory failure  Plan: unsure about degree of hypoxia related to current status and CT.  Severe emphysema, mild area of consolidation with hx of waxing and waning pulmonary nodules.    --ABG with PO2 63 on 75%.  repeat RVP negative 04/18.  No swelling on exam,   --continue methylpred q8. Work on weaning oxygen demands. I called respiratory and ask them to decrease his oxygen today.        Active Problems:    Pulmonary emphysema (HCC)  Plan: noted on prior imaging, severe.        Pneumonia of both lower lobes due to infectious organism  Plan: mild

## 2025-04-20 NOTE — PROGRESS NOTES
Hospitalist Progress Note   Admit Date:  2025  3:14 PM   Name:  Robson Cox   Age:  64 y.o.  Sex:  male  :  1960   MRN:  [id number]   Room:  3/    Presenting/Chief Complaint: No chief complaint on file.     Reason(s) for Admission: Acute hypoxic respiratory failure [J96.01]  Acute hypoxemic respiratory failure [J96.01]     Hospital Course:    64 y.o. male with history of allergic rhinitis, COPD, hypertension who presented to his pulmonologist due to reporting feeling ill since .  Patient has history of pulmonary nodules and moderately severe chronic obstructive pulmonary disease managed with Trelegy and albuterol.  Patient denies any sick contacts.  Has associated coughing, sneezing just been unchanged over the last 5 days.  Patient adheres to his Trelegy regimen and albuterol.  Patient was hypoxemic 67% and required 6 L to get back to 90%.     Subjective & 24hr Events:   Patient was seen and examined at bedside.    Patient remains on Airvo.  Patient feels little better, wife at bedside    Assessment & Plan:   Acute hypoxemic respiratory failure  - Patient with continued hypoxia.   - Continue wean oxygen as tolerated  - Patient started on prophylactic treatment for pneumonia and COPD exacerbation  - Pulmonology following  -  patient remains on Airvo FiO2 90 satting at 94%.  - Will continue to wean Airvo as tolerated  - Patient's status post 3-day course of azithromycin.  Continue Rocephin for 5 days total  - Continue Solu-Medrol 40 mg every 8 hour IV     Pulmonary emphysema  - Walk test prior to discharge     Pneumonia of both lower lobes due to infectious organism  - Continue Rocephin azithromycin  - Sputum culture     COPD  - DuoNebs  - Continue wean oxygen as tolerated  - Antibiotics as above  - CT PE negative for pulmonary embolism.  Evidence of severe emphysema.  Left lower lobe pneumonia.    PT/OT evals ordered?  Not ordered; patient not expected to need rehab  Diet:   ADULT DIET; Regular  VTE prophylaxis: Lovenox  Code status: Full Code      Non-peripheral Lines and Tubes (if present):          Telemetry (if present):           Hospital Problems:  Principal Problem:    Acute hypoxemic respiratory failure  Active Problems:    Pulmonary emphysema (HCC)    Pneumonia of both lower lobes due to infectious organism    Chronic obstructive pulmonary disease with acute lower respiratory infection  Resolved Problems:    * No resolved hospital problems. *      Objective:   Patient Vitals for the past 24 hrs:   Temp Pulse Resp BP SpO2   04/20/25 0715 97.5 °F (36.4 °C) 71 20 126/82 100 %   04/20/25 0707 -- 68 18 -- 95 %   04/20/25 0338 98.8 °F (37.1 °C) 59 19 119/77 96 %   04/20/25 0309 -- 71 19 -- 98 %   04/19/25 2259 98.2 °F (36.8 °C) 71 19 119/71 98 %   04/19/25 2231 -- 73 20 -- 98 %   04/19/25 2012 -- 75 20 -- 98 %   04/19/25 1923 97.5 °F (36.4 °C) 73 18 131/73 98 %   04/19/25 1502 -- 73 16 -- 95 %   04/19/25 1117 -- 75 16 -- 98 %       Oxygen Therapy  SpO2: 100 %  Pulse Oximeter Device Mode: Continuous  Pulse Oximeter Device Location: Forehead  O2 Device: High flow nasal cannula  Skin Assessment: Clean, dry, & intact  FiO2 : 80 %  O2 Flow Rate (L/min): 60 L/min  Blood Gas  Performed?: Yes  Quintin's Test #1: Collateral flow confirmed  Site #1: Left Radial  Site Prepped #1: Yes  Number of Attempts #1: 2  Pressure Held #1: Yes  Complications #1: None  Post-procedure #1: Standard  Specimen Status #1: Point of care  How Tolerated?: Tolerated well    Estimated body mass index is 26.7 kg/m² as calculated from the following:    Height as of this encounter: 1.753 m (5' 9\").    Weight as of this encounter: 82 kg (180 lb 12.4 oz).    Intake/Output Summary (Last 24 hours) at 4/20/2025 1051  Last data filed at 4/20/2025 0509  Gross per 24 hour   Intake --   Output 625 ml   Net -625 ml         Physical Exam:   General:    Well nourished.  Remains on Airvo FiO2 of 90 at 94%.  Head:  Normocephalic,

## 2025-04-21 LAB
ANION GAP SERPL CALC-SCNC: 11 MMOL/L (ref 7–16)
BASOPHILS # BLD: 0.03 K/UL (ref 0–0.2)
BASOPHILS NFR BLD: 0.2 % (ref 0–2)
BUN SERPL-MCNC: 31 MG/DL (ref 8–23)
CALCIUM SERPL-MCNC: 9.4 MG/DL (ref 8.8–10.2)
CHLORIDE SERPL-SCNC: 98 MMOL/L (ref 98–107)
CO2 SERPL-SCNC: 29 MMOL/L (ref 20–29)
CREAT SERPL-MCNC: 0.99 MG/DL (ref 0.8–1.3)
DIFFERENTIAL METHOD BLD: ABNORMAL
EOSINOPHIL # BLD: 0 K/UL (ref 0–0.8)
EOSINOPHIL NFR BLD: 0 % (ref 0.5–7.8)
ERYTHROCYTE [DISTWIDTH] IN BLOOD BY AUTOMATED COUNT: 13 % (ref 11.9–14.6)
GLUCOSE SERPL-MCNC: 160 MG/DL (ref 70–99)
HCT VFR BLD AUTO: 50 % (ref 41.1–50.3)
HGB BLD-MCNC: 16.7 G/DL (ref 13.6–17.2)
IMM GRANULOCYTES # BLD AUTO: 0.22 K/UL (ref 0–0.5)
IMM GRANULOCYTES NFR BLD AUTO: 1.5 % (ref 0–5)
LYMPHOCYTES # BLD: 0.53 K/UL (ref 0.5–4.6)
LYMPHOCYTES NFR BLD: 3.6 % (ref 13–44)
MCH RBC QN AUTO: 31.9 PG (ref 26.1–32.9)
MCHC RBC AUTO-ENTMCNC: 33.4 G/DL (ref 31.4–35)
MCV RBC AUTO: 95.6 FL (ref 82–102)
MONOCYTES # BLD: 0.62 K/UL (ref 0.1–1.3)
MONOCYTES NFR BLD: 4.2 % (ref 4–12)
NEUTS SEG # BLD: 13.46 K/UL (ref 1.7–8.2)
NEUTS SEG NFR BLD: 90.5 % (ref 43–78)
NRBC # BLD: 0 K/UL (ref 0–0.2)
PLATELET # BLD AUTO: 277 K/UL (ref 150–450)
PMV BLD AUTO: 9 FL (ref 9.4–12.3)
POTASSIUM SERPL-SCNC: 4.3 MMOL/L (ref 3.5–5.1)
RBC # BLD AUTO: 5.23 M/UL (ref 4.23–5.6)
SODIUM SERPL-SCNC: 139 MMOL/L (ref 136–145)
WBC # BLD AUTO: 14.9 K/UL (ref 4.3–11.1)

## 2025-04-21 PROCEDURE — 6360000002 HC RX W HCPCS: Performed by: INTERNAL MEDICINE

## 2025-04-21 PROCEDURE — 6370000000 HC RX 637 (ALT 250 FOR IP): Performed by: INTERNAL MEDICINE

## 2025-04-21 PROCEDURE — 99232 SBSQ HOSP IP/OBS MODERATE 35: CPT | Performed by: INTERNAL MEDICINE

## 2025-04-21 PROCEDURE — 85025 COMPLETE CBC W/AUTO DIFF WBC: CPT

## 2025-04-21 PROCEDURE — 94760 N-INVAS EAR/PLS OXIMETRY 1: CPT

## 2025-04-21 PROCEDURE — 2500000003 HC RX 250 WO HCPCS: Performed by: INTERNAL MEDICINE

## 2025-04-21 PROCEDURE — 2500000003 HC RX 250 WO HCPCS: Performed by: NURSE PRACTITIONER

## 2025-04-21 PROCEDURE — 2580000003 HC RX 258: Performed by: STUDENT IN AN ORGANIZED HEALTH CARE EDUCATION/TRAINING PROGRAM

## 2025-04-21 PROCEDURE — 1100000000 HC RM PRIVATE

## 2025-04-21 PROCEDURE — 36415 COLL VENOUS BLD VENIPUNCTURE: CPT

## 2025-04-21 PROCEDURE — 94640 AIRWAY INHALATION TREATMENT: CPT

## 2025-04-21 PROCEDURE — 2700000000 HC OXYGEN THERAPY PER DAY

## 2025-04-21 PROCEDURE — 80048 BASIC METABOLIC PNL TOTAL CA: CPT

## 2025-04-21 PROCEDURE — 94761 N-INVAS EAR/PLS OXIMETRY MLT: CPT

## 2025-04-21 PROCEDURE — 6360000002 HC RX W HCPCS: Performed by: NURSE PRACTITIONER

## 2025-04-21 RX ADMIN — BUDESONIDE INHALATION 500 MCG: 0.5 SUSPENSION RESPIRATORY (INHALATION) at 19:26

## 2025-04-21 RX ADMIN — Medication 4 ML: at 07:13

## 2025-04-21 RX ADMIN — ATORVASTATIN CALCIUM 20 MG: 20 TABLET, FILM COATED ORAL at 08:08

## 2025-04-21 RX ADMIN — METHYLPREDNISOLONE SODIUM SUCCINATE 40 MG: 40 INJECTION, POWDER, LYOPHILIZED, FOR SOLUTION INTRAMUSCULAR; INTRAVENOUS at 05:37

## 2025-04-21 RX ADMIN — FUROSEMIDE 40 MG: 10 INJECTION, SOLUTION INTRAMUSCULAR; INTRAVENOUS at 08:07

## 2025-04-21 RX ADMIN — ENOXAPARIN SODIUM 40 MG: 100 INJECTION SUBCUTANEOUS at 16:40

## 2025-04-21 RX ADMIN — ARFORMOTEROL TARTRATE 15 MCG: 15 SOLUTION RESPIRATORY (INHALATION) at 07:13

## 2025-04-21 RX ADMIN — ALBUTEROL SULFATE 2.5 MG: 2.5 SOLUTION RESPIRATORY (INHALATION) at 11:20

## 2025-04-21 RX ADMIN — ASPIRIN 81 MG: 81 TABLET ORAL at 08:08

## 2025-04-21 RX ADMIN — BUTALBITAL, ACETAMINOPHEN, AND CAFFEINE 1 TABLET: 50; 325; 40 TABLET ORAL at 18:39

## 2025-04-21 RX ADMIN — ARFORMOTEROL TARTRATE 15 MCG: 15 SOLUTION RESPIRATORY (INHALATION) at 19:26

## 2025-04-21 RX ADMIN — ALBUTEROL SULFATE 2.5 MG: 2.5 SOLUTION RESPIRATORY (INHALATION) at 07:13

## 2025-04-21 RX ADMIN — ALBUTEROL SULFATE 2.5 MG: 2.5 SOLUTION RESPIRATORY (INHALATION) at 19:26

## 2025-04-21 RX ADMIN — METHYLPREDNISOLONE SODIUM SUCCINATE 40 MG: 40 INJECTION, POWDER, LYOPHILIZED, FOR SOLUTION INTRAMUSCULAR; INTRAVENOUS at 16:39

## 2025-04-21 RX ADMIN — ALBUTEROL SULFATE 2.5 MG: 2.5 SOLUTION RESPIRATORY (INHALATION) at 15:45

## 2025-04-21 RX ADMIN — SODIUM CHLORIDE, PRESERVATIVE FREE 10 ML: 5 INJECTION INTRAVENOUS at 08:08

## 2025-04-21 RX ADMIN — BUDESONIDE INHALATION 500 MCG: 0.5 SUSPENSION RESPIRATORY (INHALATION) at 07:13

## 2025-04-21 RX ADMIN — Medication 4 ML: at 19:26

## 2025-04-21 RX ADMIN — SODIUM CHLORIDE, PRESERVATIVE FREE 5 ML: 5 INJECTION INTRAVENOUS at 22:46

## 2025-04-21 ASSESSMENT — PAIN SCALES - GENERAL
PAINLEVEL_OUTOF10: 0
PAINLEVEL_OUTOF10: 6

## 2025-04-21 ASSESSMENT — PAIN DESCRIPTION - LOCATION: LOCATION: HEAD

## 2025-04-21 NOTE — PLAN OF CARE
Problem: Safety - Adult  Goal: Free from fall injury  4/21/2025 0846 by Sarita Alan, RN  Outcome: Progressing  4/21/2025 0124 by Blaire Peña, RN  Outcome: Progressing

## 2025-04-21 NOTE — PROGRESS NOTES
Hospitalist Progress Note   Admit Date:  2025  3:14 PM   Name:  Robson Cox   Age:  64 y.o.  Sex:  male  :  1960   MRN:  [id number]   Room:  3/    Presenting/Chief Complaint: No chief complaint on file.     Reason(s) for Admission: Acute hypoxic respiratory failure [J96.01]  Acute hypoxemic respiratory failure [J96.01]     Hospital Course:    64 y.o. male with history of allergic rhinitis, COPD, hypertension who presented to his pulmonologist due to reporting feeling ill since .  Patient has history of pulmonary nodules and moderately severe chronic obstructive pulmonary disease managed with Trelegy and albuterol.  Patient denies any sick contacts.  Has associated coughing, sneezing just been unchanged over the last 5 days.  Patient adheres to his Trelegy regimen and albuterol.  Patient was hypoxemic 67% and required 6 L to get back to 90%.     Subjective & 24hr Events:   Patient was seen and examined at bedside.    Patient remains on Airvo.  Patient feels little better    Assessment & Plan:   Acute hypoxemic respiratory failure  - Patient with continued hypoxia.   - Continue wean oxygen as tolerated  - Patient started on prophylactic treatment for pneumonia and COPD exacerbation  - Pulmonology following  -  patient remains on Airvo FiO2 90 satting at 94%.  - Will continue to wean Airvo as tolerated  - Patient's status post 3-day course of azithromycin.  Continue Rocephin for 5 days total  - Continue Solu-Medrol 40 mg every 8 hour IV     Pulmonary emphysema  - Walk test prior to discharge     Pneumonia of both lower lobes due to infectious organism  - Continue Rocephin azithromycin  - Sputum culture     COPD  - DuoNebs  - Continue wean oxygen as tolerated  - Antibiotics as above  - CT PE negative for pulmonary embolism.  Evidence of severe emphysema.  Left lower lobe pneumonia.    PT/OT evals ordered?  Not ordered; patient not expected to need rehab  Diet:  ADULT DIET;  Regular  VTE prophylaxis: Lovenox  Code status: Full Code      Non-peripheral Lines and Tubes (if present):          Telemetry (if present):           Hospital Problems:  Principal Problem:    Acute hypoxemic respiratory failure  Active Problems:    Pulmonary emphysema (HCC)    Pneumonia of both lower lobes due to infectious organism    Chronic obstructive pulmonary disease with acute lower respiratory infection  Resolved Problems:    * No resolved hospital problems. *      Objective:   Patient Vitals for the past 24 hrs:   Temp Pulse Resp BP SpO2   04/21/25 0740 97.7 °F (36.5 °C) 71 20 130/86 93 %   04/21/25 0713 -- 65 18 -- 94 %   04/21/25 0333 -- 69 17 -- 95 %   04/21/25 0312 98.1 °F (36.7 °C) 69 17 114/84 95 %   04/20/25 2315 98.4 °F (36.9 °C) 66 16 108/74 93 %   04/20/25 2245 -- 69 16 -- 92 %   04/20/25 1952 -- 72 16 -- 92 %   04/20/25 1907 97.9 °F (36.6 °C) 69 17 125/83 93 %   04/20/25 1515 97.5 °F (36.4 °C) 72 18 115/73 93 %   04/20/25 1514 -- 70 18 -- 92 %   04/20/25 1110 97.3 °F (36.3 °C) 72 18 124/79 98 %   04/20/25 1102 -- 70 18 -- 94 %       Oxygen Therapy  SpO2: 93 %  Pulse Oximeter Device Mode: Continuous  Pulse Oximeter Device Location: Forehead  O2 Device: Heated high flow cannula  Skin Assessment: Clean, dry, & intact  FiO2 : 60 %  O2 Flow Rate (L/min): 40 L/min  Blood Gas  Performed?: Yes  Quintin's Test #1: Collateral flow confirmed  Site #1: Left Radial  Site Prepped #1: Yes  Number of Attempts #1: 2  Pressure Held #1: Yes  Complications #1: None  Post-procedure #1: Standard  Specimen Status #1: Point of care  How Tolerated?: Tolerated well    Estimated body mass index is 27.15 kg/m² as calculated from the following:    Height as of this encounter: 1.753 m (5' 9\").    Weight as of this encounter: 83.4 kg (183 lb 13.8 oz).    Intake/Output Summary (Last 24 hours) at 4/21/2025 1043  Last data filed at 4/21/2025 0939  Gross per 24 hour   Intake 250 ml   Output 3500 ml   Net -3250 ml         Physical  mmol/L    Glucose 202 (H) 70 - 99 mg/dL    BUN 27 (H) 8 - 23 MG/DL    Creatinine 1.01 0.80 - 1.30 MG/DL    EstEmilia Filt Rate 84 >60 ml/min/1.73m2    Calcium 9.3 8.8 - 10.2 MG/DL   CBC with Auto Differential    Collection Time: 04/21/25  4:56 AM   Result Value Ref Range    WBC 14.9 (H) 4.3 - 11.1 K/uL    RBC 5.23 4.23 - 5.6 M/uL    Hemoglobin 16.7 13.6 - 17.2 g/dL    Hematocrit 50.0 41.1 - 50.3 %    MCV 95.6 82 - 102 FL    MCH 31.9 26.1 - 32.9 PG    MCHC 33.4 31.4 - 35.0 g/dL    RDW 13.0 11.9 - 14.6 %    Platelets 277 150 - 450 K/uL    MPV 9.0 (L) 9.4 - 12.3 FL    nRBC 0.00 0.0 - 0.2 K/uL    Differential Type AUTOMATED      Neutrophils % 90.5 (H) 43.0 - 78.0 %    Lymphocytes % 3.6 (L) 13.0 - 44.0 %    Monocytes % 4.2 4.0 - 12.0 %    Eosinophils % 0.0 (L) 0.5 - 7.8 %    Basophils % 0.2 0.0 - 2.0 %    Immature Granulocytes % 1.5 0.0 - 5.0 %    Neutrophils Absolute 13.46 (H) 1.70 - 8.20 K/UL    Lymphocytes Absolute 0.53 0.50 - 4.60 K/UL    Monocytes Absolute 0.62 0.10 - 1.30 K/UL    Eosinophils Absolute 0.00 0.00 - 0.80 K/UL    Basophils Absolute 0.03 0.00 - 0.20 K/UL    Immature Granulocytes Absolute 0.22 0.0 - 0.5 K/UL   Basic Metabolic Panel w/ Reflex to MG    Collection Time: 04/21/25  4:56 AM   Result Value Ref Range    Sodium 139 136 - 145 mmol/L    Potassium 4.3 3.5 - 5.1 mmol/L    Chloride 98 98 - 107 mmol/L    CO2 29 20 - 29 mmol/L    Anion Gap 11 7 - 16 mmol/L    Glucose 160 (H) 70 - 99 mg/dL    BUN 31 (H) 8 - 23 MG/DL    Creatinine 0.99 0.80 - 1.30 MG/DL    Est, Glom Filt Rate 86 >60 ml/min/1.73m2    Calcium 9.4 8.8 - 10.2 MG/DL       No results for input(s): \"COVID19\" in the last 72 hours.      Current Meds:  Current Facility-Administered Medications   Medication Dose Route Frequency    methylPREDNISolone sodium succ (SOLU-MEDROL) 40 mg in sterile water 1 mL injection  40 mg IntraVENous Q8H    sodium chloride (Inhalant) 3 % nebulizer solution 4 mL  4 mL Nebulization BID    furosemide (LASIX) injection 40

## 2025-04-21 NOTE — PROGRESS NOTES
Resting in bed, alert oriented times 3. Pt on airvo 40L 60% at this time. Pt denies pain or SOB at this time. Pt encouraged to call for assistance if needed call light in reach, door open will monitor.

## 2025-04-21 NOTE — PROGRESS NOTES
Robson Cox  Admission Date: 4/16/2025         Daily Progress Note: 4/21/2025    The patient's chart is reviewed and the patient is discussed with the staff.    Background:   64-year-old male reporting illness and confinement to bed since April 11, 2025. He describes thoracic pain and worsening dyspnea, initially manifesting with symptoms resembling allergic reactions. History of pulmonary nodules, moderately severe chronic obstructive pulmonary disease (COPD) with severe emphysema managed with Trelegy and albuterol.  Quit smoking in 2023. Imaging revealed acute bilateral pulmonary infiltrates on  film.    The onset of symptoms occurred last week following a rib contusion. The patient experienced significant hypertension subsequent to an episode of anger directed at a coworker. He reported sensations of hot and cold without pyrexia. He does not use supplemental oxygen.  no sick contacts and resides with his healthy wife. There have been no recent procedures, surgeries, hospital admissions, or travel. The patient reports persistent pain associated with coughing or sneezing, which has remained unchanged over the past 4-5 days. He experiences thoracic pain during coughing and expectorates a small amount of sputum. Initially, he self-medicated with acetaminophen and began using guaifenesin (Mucinex) upon the involvement of his chest.     He was hypoxemic to 67% and needed 6L to get to 90%. He otherwise appears clinically stable.     Subjective/Objective:   On airvo 60L, 40%, sats 92-95%. Afebrile, VS stable; Says he is feeling a lot better. He reports he is able to get out of bed independently as well. Not coughing, not short of breath right now. Hadn't been shown how to use IS, but is able to pull 1500-2000cc as soon as it was explained to him.     Current Facility-Administered Medications   Medication Dose Route Frequency    methylPREDNISolone sodium succ (SOLU-MEDROL) 40 mg in sterile  M,G,R. There is no lower extremity edema.  Musculoskeletal: warm without cyanosis. Normal muscle tone.    Skin:  no jaundice or rashes, no wounds   Neurologic: symmetric strength, fluent speech  Psychiatric:  calm, appropriate, oriented x 4    Imaging: I performed an independent interpretation of the patient's images.  CXR:   04/17/25 04/16/25      CT Chest:   11/15/24      CT Chest: 04/17/25      LAB:  Recent Labs     04/19/25  0816 04/20/25  0444 04/21/25  0456   WBC 19.6* 18.5* 14.9*   HGB 17.0 16.9 16.7   HCT 49.7 49.5 50.0    276 277     Recent Labs     04/19/25  0816 04/20/25  0444 04/21/25  0456    140 139   K 3.9 4.0 4.3   CL 99 97* 98   CO2 30* 31* 29   BUN 25* 27* 31*   CREATININE 0.96 1.01 0.99     No results for input(s): \"TROPHS\", \"NTPROBNP\", \"CRP\", \"ESR\" in the last 72 hours.  Recent Labs     04/19/25  0816 04/20/25  0444 04/21/25  0456   GLUCOSE 178* 202* 160*      Microbiology:   Recent Labs     04/18/25  1537   CULTURE LIGHT NORMAL RESPIRATORY GIOVANNI     No results for input(s): \"COVID19\" in the last 72 hours.    ECHO: No results found for this or any previous visit.    Assessment and Plan:  (Medical Decision Making)   Impression: 65 y/o male with shortness of breath, cough and acutely ill for 4 days PTA with acute hypoxemia, likely pneumonia.       Acute hypoxemic respiratory failure  Plan:   -Severe emphysema, mild area of consolidation with hx of waxing and waning pulmonary nodules.    -ABG with PO2 63 on 75%.  repeat RVP negative 04/18.    -No swelling on exam,   -Continue methylpred q8. Can likely switch to PO today or tomorrow  -Work on weaning oxygen demands.    Pulmonary emphysema (HCC)  Plan:  -Noted on prior imaging, severe.    -Plan on walk test prior to DC    Pneumonia of both lower lobes due to infectious organism  Plan:   -Mild leukocytosis on admit at 12, WBC trending down.    -Persistent hypoxia and now on Airvo 60L, 40%, sats 92/95%.    -CT Chest : Severe  bilateral centrilobular emphysema with right lower lobe and left  lower lobe atelectasis. Additionally there is left lower lobe consolidation  concerning for pneumonia.  -Sputum cultures with light normal respiratory merna.   -Negative RVP.    -Azithromycin complete  -Continue rocephin for 5 days total  -IS    Chronic obstructive pulmonary disease with acute lower respiratory infection  Plan: continue nebs.  2022 with FEV1 53% and DLCO 30%      More than 50% of the time documented was spent in face-to-face contact with the patient and in the care of the patient on the floor/unit where the patient is located.    In this split/shared evaluation I performed performed a medically appropriate history and exam, counseled and educated the patient and/or family member, ordered medications, tests or procedures, documented information in EMR, and coordinated care. which accounted for 14 minutes of clinical time.     Shayla Willett, FNP     ATTENDING ADDENDUM:    In this split/shared evaluation I performed reviewed the patients's H&P, available images, labs, cultures., discussed case in detail with NPP, performed a medically appropriate history and exam, counseled and educated the patient and/or family member, ordered and/or reviewed medications, tests or procedures, documented information in EMR, independently interpreted images, and coordinated care. which accounted for 15 minutes clinical time.     Impression: 63 y/o male with shortness of breath, cough and acutely ill for 4 days PTA with acute hypoxemia, likely pneumonia. RVP negative. Respiratory culture negative. No coughing much up. Weaned to 60% AirVo, looks good. No gross mucus plugging on CXR/CT, continue nebs. Finish abx, continue nebs and lasix. Wean O2 as able. Don't see any good reason why bronchoscopy would be beneficial given imaging and lack of sensation of inability to clear secretions.     Leslie Wesley MD

## 2025-04-22 ENCOUNTER — APPOINTMENT (OUTPATIENT)
Dept: GENERAL RADIOLOGY | Age: 65
End: 2025-04-22
Attending: INTERNAL MEDICINE
Payer: COMMERCIAL

## 2025-04-22 ENCOUNTER — APPOINTMENT (OUTPATIENT)
Dept: NON INVASIVE DIAGNOSTICS | Age: 65
End: 2025-04-22
Attending: INTERNAL MEDICINE
Payer: COMMERCIAL

## 2025-04-22 LAB
ANION GAP SERPL CALC-SCNC: 13 MMOL/L (ref 7–16)
BASOPHILS # BLD: 0.03 K/UL (ref 0–0.2)
BASOPHILS NFR BLD: 0.2 % (ref 0–2)
BUN SERPL-MCNC: 30 MG/DL (ref 8–23)
CALCIUM SERPL-MCNC: 9.1 MG/DL (ref 8.8–10.2)
CHLORIDE SERPL-SCNC: 96 MMOL/L (ref 98–107)
CO2 SERPL-SCNC: 29 MMOL/L (ref 20–29)
CREAT SERPL-MCNC: 1.07 MG/DL (ref 0.8–1.3)
CRP SERPL-MCNC: <0.3 MG/DL (ref 0–0.4)
DIFFERENTIAL METHOD BLD: ABNORMAL
ECHO AO ROOT DIAM: 3.7 CM
ECHO AO ROOT INDEX: 1.85 CM/M2
ECHO AV AREA PEAK VELOCITY: 2.3 CM2
ECHO AV AREA VTI: 2.5 CM2
ECHO AV AREA/BSA PEAK VELOCITY: 1.2 CM2/M2
ECHO AV AREA/BSA VTI: 1.3 CM2/M2
ECHO AV MEAN GRADIENT: 3 MMHG
ECHO AV MEAN VELOCITY: 0.8 M/S
ECHO AV PEAK GRADIENT: 5 MMHG
ECHO AV PEAK GRADIENT: 5 MMHG
ECHO AV PEAK VELOCITY: 1.1 M/S
ECHO AV VELOCITY RATIO: 0.73
ECHO AV VTI: 21.1 CM
ECHO BSA: 2 M2
ECHO IVC PROX: 1.2 CM
ECHO LA AREA 2C: 13.4 CM2
ECHO LA AREA 4C: 13.5 CM2
ECHO LA DIAMETER INDEX: 2 CM/M2
ECHO LA DIAMETER: 4 CM
ECHO LA MAJOR AXIS: 4.5 CM
ECHO LA MINOR AXIS: 4.5 CM
ECHO LA TO AORTIC ROOT RATIO: 1.08
ECHO LA VOL BP: 30 ML (ref 18–58)
ECHO LA VOL MOD A2C: 32 ML (ref 18–58)
ECHO LA VOL MOD A4C: 29 ML (ref 18–58)
ECHO LA VOL/BSA BIPLANE: 15 ML/M2 (ref 16–34)
ECHO LA VOLUME INDEX MOD A2C: 16 ML/M2 (ref 16–34)
ECHO LA VOLUME INDEX MOD A4C: 15 ML/M2 (ref 16–34)
ECHO LV E' LATERAL VELOCITY: 17.3 CM/S
ECHO LV E' SEPTAL VELOCITY: 10.2 CM/S
ECHO LV EDV A2C: 84 ML
ECHO LV EDV A4C: 71 ML
ECHO LV EDV INDEX A4C: 36 ML/M2
ECHO LV EDV NDEX A2C: 42 ML/M2
ECHO LV EF PHYSICIAN: 55 %
ECHO LV EJECTION FRACTION A2C: 45 %
ECHO LV EJECTION FRACTION A4C: 44 %
ECHO LV EJECTION FRACTION BIPLANE: 55 % (ref 55–100)
ECHO LV ESV A2C: 46 ML
ECHO LV ESV A4C: 39 ML
ECHO LV ESV INDEX A2C: 23 ML/M2
ECHO LV ESV INDEX A4C: 20 ML/M2
ECHO LV FRACTIONAL SHORTENING: 33 % (ref 28–44)
ECHO LV INTERNAL DIMENSION DIASTOLE INDEX: 2.75 CM/M2
ECHO LV INTERNAL DIMENSION DIASTOLIC: 5.5 CM (ref 4.2–5.9)
ECHO LV INTERNAL DIMENSION SYSTOLIC INDEX: 1.85 CM/M2
ECHO LV INTERNAL DIMENSION SYSTOLIC: 3.7 CM
ECHO LV IVSD: 1.1 CM (ref 0.6–1)
ECHO LV MASS 2D: 242 G (ref 88–224)
ECHO LV MASS INDEX 2D: 121 G/M2 (ref 49–115)
ECHO LV POSTERIOR WALL DIASTOLIC: 1.1 CM (ref 0.6–1)
ECHO LV RELATIVE WALL THICKNESS RATIO: 0.4
ECHO LVOT AREA: 3.1 CM2
ECHO LVOT AV VTI INDEX: 0.8
ECHO LVOT DIAM: 2 CM
ECHO LVOT MEAN GRADIENT: 1 MMHG
ECHO LVOT MEAN GRADIENT: 1 MMHG
ECHO LVOT PEAK GRADIENT: 3 MMHG
ECHO LVOT PEAK VELOCITY: 0.8 M/S
ECHO LVOT STROKE VOLUME INDEX: 26.4 ML/M2
ECHO LVOT SV: 52.8 ML
ECHO LVOT VTI: 16.8 CM
ECHO MV A VELOCITY: 1.01 M/S
ECHO MV AREA VTI: 2.5 CM2
ECHO MV E DECELERATION TIME (DT): 192 MS
ECHO MV E VELOCITY: 0.49 M/S
ECHO MV E/A RATIO: 0.49
ECHO MV E/E' LATERAL: 2.83
ECHO MV E/E' RATIO (AVERAGED): 3.82
ECHO MV E/E' SEPTAL: 4.8
ECHO MV LVOT VTI INDEX: 1.28
ECHO MV MAX VELOCITY: 0.9 M/S
ECHO MV MEAN GRADIENT: 1 MMHG
ECHO MV MEAN VELOCITY: 0.4 M/S
ECHO MV PEAK GRADIENT: 3 MMHG
ECHO MV VTI: 21.5 CM
ECHO PV ACCELERATION TIME (AT): 95 MS
ECHO PV MAX VELOCITY: 0.8 M/S
ECHO PV PEAK GRADIENT: 3 MMHG
ECHO RV BASAL DIMENSION: 3.5 CM
ECHO RV FREE WALL PEAK S': 10 CM/S
ECHO RV TAPSE: 1.7 CM (ref 1.7–?)
EOSINOPHIL # BLD: 0.02 K/UL (ref 0–0.8)
EOSINOPHIL NFR BLD: 0.1 % (ref 0.5–7.8)
ERYTHROCYTE [DISTWIDTH] IN BLOOD BY AUTOMATED COUNT: 12.8 % (ref 11.9–14.6)
ERYTHROCYTE [SEDIMENTATION RATE] IN BLOOD: 8 MM/HR
GLUCOSE BLD STRIP.AUTO-MCNC: 113 MG/DL (ref 65–100)
GLUCOSE BLD STRIP.AUTO-MCNC: 145 MG/DL (ref 65–100)
GLUCOSE BLD STRIP.AUTO-MCNC: 162 MG/DL (ref 65–100)
GLUCOSE BLD STRIP.AUTO-MCNC: 200 MG/DL (ref 65–100)
GLUCOSE SERPL-MCNC: 213 MG/DL (ref 70–99)
HCT VFR BLD AUTO: 51.2 % (ref 41.1–50.3)
HGB BLD-MCNC: 17.1 G/DL (ref 13.6–17.2)
IMM GRANULOCYTES # BLD AUTO: 0.24 K/UL (ref 0–0.5)
IMM GRANULOCYTES NFR BLD AUTO: 1.5 % (ref 0–5)
LYMPHOCYTES # BLD: 0.84 K/UL (ref 0.5–4.6)
LYMPHOCYTES NFR BLD: 5.3 % (ref 13–44)
MCH RBC QN AUTO: 32.2 PG (ref 26.1–32.9)
MCHC RBC AUTO-ENTMCNC: 33.4 G/DL (ref 31.4–35)
MCV RBC AUTO: 96.4 FL (ref 82–102)
MONOCYTES # BLD: 1.04 K/UL (ref 0.1–1.3)
MONOCYTES NFR BLD: 6.5 % (ref 4–12)
NEUTS SEG # BLD: 13.77 K/UL (ref 1.7–8.2)
NEUTS SEG NFR BLD: 86.4 % (ref 43–78)
NRBC # BLD: 0 K/UL (ref 0–0.2)
NT PRO BNP: 111 PG/ML (ref 0–125)
PLATELET # BLD AUTO: 286 K/UL (ref 150–450)
PMV BLD AUTO: 9 FL (ref 9.4–12.3)
POTASSIUM SERPL-SCNC: 3.9 MMOL/L (ref 3.5–5.1)
PROCALCITONIN SERPL-MCNC: 0.05 NG/ML (ref 0–0.1)
RBC # BLD AUTO: 5.31 M/UL (ref 4.23–5.6)
SERVICE CMNT-IMP: ABNORMAL
SODIUM SERPL-SCNC: 137 MMOL/L (ref 136–145)
WBC # BLD AUTO: 15.9 K/UL (ref 4.3–11.1)

## 2025-04-22 PROCEDURE — 82962 GLUCOSE BLOOD TEST: CPT

## 2025-04-22 PROCEDURE — 6360000002 HC RX W HCPCS: Performed by: INTERNAL MEDICINE

## 2025-04-22 PROCEDURE — 94640 AIRWAY INHALATION TREATMENT: CPT

## 2025-04-22 PROCEDURE — 93306 TTE W/DOPPLER COMPLETE: CPT

## 2025-04-22 PROCEDURE — 86140 C-REACTIVE PROTEIN: CPT

## 2025-04-22 PROCEDURE — 83880 ASSAY OF NATRIURETIC PEPTIDE: CPT

## 2025-04-22 PROCEDURE — 80048 BASIC METABOLIC PNL TOTAL CA: CPT

## 2025-04-22 PROCEDURE — 71045 X-RAY EXAM CHEST 1 VIEW: CPT

## 2025-04-22 PROCEDURE — 6370000000 HC RX 637 (ALT 250 FOR IP): Performed by: FAMILY MEDICINE

## 2025-04-22 PROCEDURE — 2580000003 HC RX 258: Performed by: STUDENT IN AN ORGANIZED HEALTH CARE EDUCATION/TRAINING PROGRAM

## 2025-04-22 PROCEDURE — 1100000000 HC RM PRIVATE

## 2025-04-22 PROCEDURE — 2700000000 HC OXYGEN THERAPY PER DAY

## 2025-04-22 PROCEDURE — 2500000003 HC RX 250 WO HCPCS: Performed by: INTERNAL MEDICINE

## 2025-04-22 PROCEDURE — 99233 SBSQ HOSP IP/OBS HIGH 50: CPT | Performed by: INTERNAL MEDICINE

## 2025-04-22 PROCEDURE — 85025 COMPLETE CBC W/AUTO DIFF WBC: CPT

## 2025-04-22 PROCEDURE — 85652 RBC SED RATE AUTOMATED: CPT

## 2025-04-22 PROCEDURE — 36415 COLL VENOUS BLD VENIPUNCTURE: CPT

## 2025-04-22 PROCEDURE — 94761 N-INVAS EAR/PLS OXIMETRY MLT: CPT

## 2025-04-22 PROCEDURE — 6370000000 HC RX 637 (ALT 250 FOR IP): Performed by: INTERNAL MEDICINE

## 2025-04-22 PROCEDURE — 84145 PROCALCITONIN (PCT): CPT

## 2025-04-22 PROCEDURE — 6360000002 HC RX W HCPCS: Performed by: FAMILY MEDICINE

## 2025-04-22 PROCEDURE — 93306 TTE W/DOPPLER COMPLETE: CPT | Performed by: INTERNAL MEDICINE

## 2025-04-22 RX ORDER — INSULIN LISPRO 100 [IU]/ML
0-4 INJECTION, SOLUTION INTRAVENOUS; SUBCUTANEOUS
Status: DISCONTINUED | OUTPATIENT
Start: 2025-04-22 | End: 2025-04-26 | Stop reason: HOSPADM

## 2025-04-22 RX ORDER — PREDNISONE 20 MG/1
40 TABLET ORAL DAILY
Status: DISCONTINUED | OUTPATIENT
Start: 2025-04-22 | End: 2025-04-22

## 2025-04-22 RX ORDER — ALBUTEROL SULFATE 0.83 MG/ML
2.5 SOLUTION RESPIRATORY (INHALATION)
Status: DISCONTINUED | OUTPATIENT
Start: 2025-04-22 | End: 2025-04-26 | Stop reason: HOSPADM

## 2025-04-22 RX ORDER — IBUPROFEN 600 MG/1
1 TABLET ORAL PRN
Status: DISCONTINUED | OUTPATIENT
Start: 2025-04-22 | End: 2025-04-26 | Stop reason: HOSPADM

## 2025-04-22 RX ORDER — DEXTROSE MONOHYDRATE 100 MG/ML
INJECTION, SOLUTION INTRAVENOUS CONTINUOUS PRN
Status: DISCONTINUED | OUTPATIENT
Start: 2025-04-22 | End: 2025-04-26 | Stop reason: HOSPADM

## 2025-04-22 RX ORDER — PREDNISONE 20 MG/1
40 TABLET ORAL DAILY
Status: DISCONTINUED | OUTPATIENT
Start: 2025-04-23 | End: 2025-04-26 | Stop reason: HOSPADM

## 2025-04-22 RX ADMIN — BUDESONIDE INHALATION 500 MCG: 0.5 SUSPENSION RESPIRATORY (INHALATION) at 20:35

## 2025-04-22 RX ADMIN — BUDESONIDE INHALATION 500 MCG: 0.5 SUSPENSION RESPIRATORY (INHALATION) at 07:11

## 2025-04-22 RX ADMIN — ASPIRIN 81 MG: 81 TABLET ORAL at 08:59

## 2025-04-22 RX ADMIN — ARFORMOTEROL TARTRATE 15 MCG: 15 SOLUTION RESPIRATORY (INHALATION) at 07:11

## 2025-04-22 RX ADMIN — SODIUM CHLORIDE, PRESERVATIVE FREE 10 ML: 5 INJECTION INTRAVENOUS at 09:00

## 2025-04-22 RX ADMIN — Medication 4 ML: at 07:12

## 2025-04-22 RX ADMIN — ENOXAPARIN SODIUM 40 MG: 100 INJECTION SUBCUTANEOUS at 17:11

## 2025-04-22 RX ADMIN — ALBUTEROL SULFATE 2.5 MG: 2.5 SOLUTION RESPIRATORY (INHALATION) at 07:12

## 2025-04-22 RX ADMIN — ALBUTEROL SULFATE 2.5 MG: 2.5 SOLUTION RESPIRATORY (INHALATION) at 13:45

## 2025-04-22 RX ADMIN — SODIUM CHLORIDE, PRESERVATIVE FREE 10 ML: 5 INJECTION INTRAVENOUS at 21:04

## 2025-04-22 RX ADMIN — FUROSEMIDE 40 MG: 10 INJECTION, SOLUTION INTRAMUSCULAR; INTRAVENOUS at 08:59

## 2025-04-22 RX ADMIN — INSULIN LISPRO 1 UNITS: 100 INJECTION, SOLUTION INTRAVENOUS; SUBCUTANEOUS at 21:03

## 2025-04-22 RX ADMIN — ATORVASTATIN CALCIUM 20 MG: 20 TABLET, FILM COATED ORAL at 08:59

## 2025-04-22 RX ADMIN — Medication 4 ML: at 20:35

## 2025-04-22 RX ADMIN — ARFORMOTEROL TARTRATE 15 MCG: 15 SOLUTION RESPIRATORY (INHALATION) at 20:35

## 2025-04-22 RX ADMIN — METHYLPREDNISOLONE SODIUM SUCCINATE 40 MG: 40 INJECTION, POWDER, LYOPHILIZED, FOR SOLUTION INTRAMUSCULAR; INTRAVENOUS at 05:21

## 2025-04-22 RX ADMIN — ALBUTEROL SULFATE 2.5 MG: 2.5 SOLUTION RESPIRATORY (INHALATION) at 20:35

## 2025-04-22 NOTE — PLAN OF CARE
Problem: Discharge Planning  Goal: Discharge to home or other facility with appropriate resources  Outcome: Progressing     Problem: Respiratory - Adult  Goal: Achieves optimal ventilation and oxygenation  Outcome: Progressing     Problem: Safety - Adult  Goal: Free from fall injury  Outcome: Progressing     Problem: Pain  Goal: Verbalizes/displays adequate comfort level or baseline comfort level  Outcome: Progressing     Problem: Skin/Tissue Integrity  Goal: Skin integrity remains intact  Description: 1.  Monitor for areas of redness and/or skin breakdown2.  Assess vascular access sites hourly3.  Every 4-6 hours minimum:  Change oxygen saturation probe site4.  Every 4-6 hours:  If on nasal continuous positive airway pressure, respiratory therapy assess nares and determine need for appliance change or resting period  Outcome: Progressing

## 2025-04-22 NOTE — PROGRESS NOTES
Hospitalist Progress Note   Admit Date:  2025  3:14 PM   Name:  Robson Cox   Age:  64 y.o.  Sex:  male  :  1960   MRN:  [id number]   Room:  3/    Presenting/Chief Complaint: No chief complaint on file.     Reason(s) for Admission: Acute hypoxic respiratory failure [J96.01]  Acute hypoxemic respiratory failure [J96.01]     Hospital Day #6      Hospital Course:   64 y.o. male with history of allergic rhinitis, COPD, hypertension who was seen in his pulmonologist office on  due to feeling ill since .  He was recommended by the pulmonology service to the hospital following that office visit.  Hospitalist service was requested to assume primary management following the admission.  Patient has history of pulmonary nodules and moderately severe chronic obstructive pulmonary disease managed with Trelegy and albuterol. Patient denies any sick contacts. Has associated coughing, sneezing just been unchanged over the last 5 days. Patient adheres to his Trelegy regimen and albuterol. On presentation patient was hypoxemic 67% and required 6 L to get back to 90%.     Subjective & 24hr Events:   Patient seen in bed, nearly upright, watching TV.  He remains on Airvo.  Denies dyspnea while at rest, but endorses SERNA.    Assessment & Plan:     Bilateral lower lobe pneumonia, POA  Afebrile.  WBC remains mildly elevated secondary to ongoing steroids for COPD exacerbation.  Received 5 days of IV ceftriaxone, completed .  Received 3 days of IV azithromycin, completed .    COPD exacerbation/severe pulmonary emphysema  Remains on IV Solu-Medrol, arformoterol and budesonide twice daily, albuterol every 4 hours, hypertonic saline nebs twice daily.  Pulmonology following.    Acute hypoxic respiratory failure  Remains on Airvo.  Continue to wean supplemental O2 off as tolerated.  Will need 6-minute walk test prior to discharge for anticipated need for post-hospitalization supplemental

## 2025-04-22 NOTE — PROGRESS NOTES
Robson Cox  Admission Date: 4/16/2025         Daily Progress Note: 4/22/2025    The patient's chart is reviewed and the patient is discussed with the staff.    Background: Pt is a 63 yo male with history of tobacco abuse, COPD, pulmonary nodule, allergic rhinitis, and HTN who was seen by Dr. Wesley in the office on 4/16 with complains of worsening dyspnea, thoracic pain, and being confined to the bed since 4/11 after rib contusion. He was hypoxic to 67% on RA and required 6L to improve to 90%. Pt had a CXR which revealed reticulonodular and patchy infiltrates bilaterally. Pt was directly admitted to [facility] for treatment of acute respiratory failure and concern for PNA. Hospitalist was consulted to assume care of pt. Pt was placed on airvo. RVP negative. Pt had chest CTA which was negative for PE but revealed severe B centrilobular emphysema with RLL and LLL atelectasis with LLL consolidation. Sputum cx with light normal resp merna.   Subjective:     Pt is on airvo 40L at 48% with sat %.   Pt is getting a neb treatment. He is coughing but not really coughing anything out.   He denies pain. He denies shortness of breath but he is not getting up.     Current Facility-Administered Medications   Medication Dose Route Frequency    glucose chewable tablet 16 g  4 tablet Oral PRN    dextrose bolus 10% 125 mL  125 mL IntraVENous PRN    Or    dextrose bolus 10% 250 mL  250 mL IntraVENous PRN    Glucagon Emergency KIT 1 mg  1 mg SubCUTAneous PRN    dextrose 10 % infusion   IntraVENous Continuous PRN    insulin lispro (HUMALOG,ADMELOG) injection vial 0-4 Units  0-4 Units SubCUTAneous 4x Daily AC & HS    methylPREDNISolone sodium succ (SOLU-MEDROL) 40 mg in sterile water 1 mL injection  40 mg IntraVENous Q12H    sodium chloride (Inhalant) 3 % nebulizer solution 4 mL  4 mL Nebulization BID    furosemide (LASIX) injection 40 mg  40 mg IntraVENous Daily    aspirin EC tablet 81 mg  81 mg Oral Daily     fluent speech  Psychiatric:  calm, appropriate, oriented x 4    Imaging: I performed an independent interpretation of the patient's images.  CXR: 4/17      Chest CTA:  4/17/25 compared to 11/15/24          LAB:  Recent Labs     04/20/25  0444 04/21/25  0456 04/22/25  0404   WBC 18.5* 14.9* 15.9*   HGB 16.9 16.7 17.1   HCT 49.5 50.0 51.2*    277 286     Recent Labs     04/20/25  0444 04/21/25  0456 04/22/25  0404    139 137   K 4.0 4.3 3.9   CL 97* 98 96*   CO2 31* 29 29   BUN 27* 31* 30*   CREATININE 1.01 0.99 1.07     No results for input(s): \"TROPHS\", \"NTPROBNP\", \"CRP\", \"ESR\" in the last 72 hours.  Recent Labs     04/20/25  0444 04/21/25  0456 04/22/25  0404   GLUCOSE 202* 160* 213*      Microbiology:   No results for input(s): \"CULTURE\" in the last 72 hours.  No results for input(s): \"COVID19\" in the last 72 hours.  ECHO: No results found for this or any previous visit.    Assessment and Plan:  (Medical Decision Making)   Impression: Pt is a 63 yo with emphysema, prior tobacco abuse, HTN who presented to the office 4/16 with acute respiratory failure requiring 6L O2. Pt was directly admitted and started on IV abx. Chest CTA negative for PE but revealed severe emphysematous changes with RLL/LLL atelectasis and LLL infiltrate.      Principal Problem:    Pneumonia of both lower lobes due to infectious organism  Plan: pt completed azithromycin/ceftriaxone  Continue solumedrol  Active Problems:    Pulmonary emphysema (HCC)  Plan: severe, continue nebs,     Acute hypoxemic respiratory failure  Plan: wean O2 as tolerated     Chronic obstructive pulmonary disease with acute lower respiratory infection  Plan: completed abx, continue nebs and steroids.     Will consult PT to get pt up and moving more.      More than 50% of the time documented was spent in face-to-face contact with the patient and in the care of the patient on the floor/unit where the patient is located.    In this split/shared evaluation I  performed performed a medically appropriate history and exam, counseled and educated the patient and/or family member, ordered medications, tests or procedures, documented information in EMR, and coordinated care. which accounted for 18 minutes of clinical time.     EMIR Martinez    ATTENDING ADDENDUM:    In this split/shared evaluation I performed reviewed the patients's H&P, available images, labs, cultures., discussed case in detail with NPP, performed a medically appropriate history and exam, counseled and educated the patient and/or family member, ordered and/or reviewed medications, tests or procedures, documented information in EMR, independently interpreted images, and coordinated care. which accounted for 19 minutes clinical time.     Impression: 63 y/o male with shortness of breath, cough and acutely ill for 4 days PTA with acute hypoxemia, likely pneumonia. RVP negative. Respiratory culture negative. No coughing much up. Weaned to 55% AirVo, looks good. No gross mucus plugging on CXR/CT, continue nebs. Finish abx, continue nebs and lasix. Wean O2 as able. Since still on AirVo will recheck CXR, check TTE with bubble study, check CRP, ESR, BNP and PCT to see what may still be driving significant hypoxemia. He does have severe emphysema and may have just been significant pneumonia to the small amount of lung that is functional. Still would expect a fairly good recovery with time, but expect he will need O2 at discharge and likely will need to some degree long term with some polycythemia on labs.     Leslie Wesley MD

## 2025-04-22 NOTE — PLAN OF CARE
Problem: Respiratory - Adult  Goal: Achieves optimal ventilation and oxygenation  4/22/2025 0732 by Jose Dsouza RCP  Outcome: Progressing  4/22/2025 0732 by Jose Dsouza RCP  Outcome: Progressing  4/22/2025 0312 by Danyell Ley RN  Outcome: Progressing

## 2025-04-23 LAB
ANION GAP SERPL CALC-SCNC: 12 MMOL/L (ref 7–16)
BASOPHILS # BLD: 0.07 K/UL (ref 0–0.2)
BASOPHILS NFR BLD: 0.5 % (ref 0–2)
BUN SERPL-MCNC: 32 MG/DL (ref 8–23)
CALCIUM SERPL-MCNC: 8.9 MG/DL (ref 8.8–10.2)
CHLORIDE SERPL-SCNC: 97 MMOL/L (ref 98–107)
CO2 SERPL-SCNC: 31 MMOL/L (ref 20–29)
CREAT SERPL-MCNC: 0.93 MG/DL (ref 0.8–1.3)
DIFFERENTIAL METHOD BLD: ABNORMAL
EOSINOPHIL # BLD: 0.23 K/UL (ref 0–0.8)
EOSINOPHIL NFR BLD: 1.5 % (ref 0.5–7.8)
ERYTHROCYTE [DISTWIDTH] IN BLOOD BY AUTOMATED COUNT: 12.7 % (ref 11.9–14.6)
GLUCOSE BLD STRIP.AUTO-MCNC: 133 MG/DL (ref 65–100)
GLUCOSE BLD STRIP.AUTO-MCNC: 147 MG/DL (ref 65–100)
GLUCOSE BLD STRIP.AUTO-MCNC: 160 MG/DL (ref 65–100)
GLUCOSE BLD STRIP.AUTO-MCNC: 198 MG/DL (ref 65–100)
GLUCOSE BLD STRIP.AUTO-MCNC: 91 MG/DL (ref 65–100)
GLUCOSE SERPL-MCNC: 109 MG/DL (ref 70–99)
HCT VFR BLD AUTO: 53.9 % (ref 41.1–50.3)
HGB BLD-MCNC: 17.8 G/DL (ref 13.6–17.2)
IMM GRANULOCYTES # BLD AUTO: 0.55 K/UL (ref 0–0.5)
IMM GRANULOCYTES NFR BLD AUTO: 3.7 % (ref 0–5)
LYMPHOCYTES # BLD: 2.15 K/UL (ref 0.5–4.6)
LYMPHOCYTES NFR BLD: 14.5 % (ref 13–44)
MAGNESIUM SERPL-MCNC: 2.6 MG/DL (ref 1.8–2.4)
MCH RBC QN AUTO: 32 PG (ref 26.1–32.9)
MCHC RBC AUTO-ENTMCNC: 33 G/DL (ref 31.4–35)
MCV RBC AUTO: 96.9 FL (ref 82–102)
MONOCYTES # BLD: 1.1 K/UL (ref 0.1–1.3)
MONOCYTES NFR BLD: 7.4 % (ref 4–12)
NEUTS SEG # BLD: 10.76 K/UL (ref 1.7–8.2)
NEUTS SEG NFR BLD: 72.4 % (ref 43–78)
NRBC # BLD: 0 K/UL (ref 0–0.2)
PLATELET # BLD AUTO: 290 K/UL (ref 150–450)
PMV BLD AUTO: 8.7 FL (ref 9.4–12.3)
POTASSIUM SERPL-SCNC: 3.2 MMOL/L (ref 3.5–5.1)
RBC # BLD AUTO: 5.56 M/UL (ref 4.23–5.6)
SERVICE CMNT-IMP: ABNORMAL
SERVICE CMNT-IMP: NORMAL
SODIUM SERPL-SCNC: 139 MMOL/L (ref 136–145)
WBC # BLD AUTO: 14.9 K/UL (ref 4.3–11.1)

## 2025-04-23 PROCEDURE — 36415 COLL VENOUS BLD VENIPUNCTURE: CPT

## 2025-04-23 PROCEDURE — 2700000000 HC OXYGEN THERAPY PER DAY

## 2025-04-23 PROCEDURE — 1100000000 HC RM PRIVATE

## 2025-04-23 PROCEDURE — 85025 COMPLETE CBC W/AUTO DIFF WBC: CPT

## 2025-04-23 PROCEDURE — 2500000003 HC RX 250 WO HCPCS: Performed by: INTERNAL MEDICINE

## 2025-04-23 PROCEDURE — 6370000000 HC RX 637 (ALT 250 FOR IP): Performed by: FAMILY MEDICINE

## 2025-04-23 PROCEDURE — 6360000002 HC RX W HCPCS: Performed by: FAMILY MEDICINE

## 2025-04-23 PROCEDURE — 83735 ASSAY OF MAGNESIUM: CPT

## 2025-04-23 PROCEDURE — 80048 BASIC METABOLIC PNL TOTAL CA: CPT

## 2025-04-23 PROCEDURE — 94640 AIRWAY INHALATION TREATMENT: CPT

## 2025-04-23 PROCEDURE — 99232 SBSQ HOSP IP/OBS MODERATE 35: CPT | Performed by: INTERNAL MEDICINE

## 2025-04-23 PROCEDURE — 6360000002 HC RX W HCPCS: Performed by: INTERNAL MEDICINE

## 2025-04-23 PROCEDURE — 97530 THERAPEUTIC ACTIVITIES: CPT

## 2025-04-23 PROCEDURE — 97161 PT EVAL LOW COMPLEX 20 MIN: CPT

## 2025-04-23 PROCEDURE — 2580000003 HC RX 258: Performed by: STUDENT IN AN ORGANIZED HEALTH CARE EDUCATION/TRAINING PROGRAM

## 2025-04-23 PROCEDURE — 94761 N-INVAS EAR/PLS OXIMETRY MLT: CPT

## 2025-04-23 PROCEDURE — 82962 GLUCOSE BLOOD TEST: CPT

## 2025-04-23 PROCEDURE — 6370000000 HC RX 637 (ALT 250 FOR IP): Performed by: INTERNAL MEDICINE

## 2025-04-23 RX ORDER — POTASSIUM CHLORIDE 1500 MG/1
40 TABLET, EXTENDED RELEASE ORAL ONCE
Status: COMPLETED | OUTPATIENT
Start: 2025-04-23 | End: 2025-04-23

## 2025-04-23 RX ADMIN — ALBUTEROL SULFATE 2.5 MG: 2.5 SOLUTION RESPIRATORY (INHALATION) at 08:05

## 2025-04-23 RX ADMIN — ENOXAPARIN SODIUM 40 MG: 100 INJECTION SUBCUTANEOUS at 17:22

## 2025-04-23 RX ADMIN — FUROSEMIDE 40 MG: 10 INJECTION, SOLUTION INTRAMUSCULAR; INTRAVENOUS at 09:40

## 2025-04-23 RX ADMIN — POTASSIUM CHLORIDE 40 MEQ: 1500 TABLET, EXTENDED RELEASE ORAL at 06:45

## 2025-04-23 RX ADMIN — ALBUTEROL SULFATE 2.5 MG: 2.5 SOLUTION RESPIRATORY (INHALATION) at 21:19

## 2025-04-23 RX ADMIN — ARFORMOTEROL TARTRATE 15 MCG: 15 SOLUTION RESPIRATORY (INHALATION) at 21:19

## 2025-04-23 RX ADMIN — Medication 4 ML: at 08:05

## 2025-04-23 RX ADMIN — SODIUM CHLORIDE, PRESERVATIVE FREE 10 ML: 5 INJECTION INTRAVENOUS at 22:21

## 2025-04-23 RX ADMIN — BUDESONIDE INHALATION 500 MCG: 0.5 SUSPENSION RESPIRATORY (INHALATION) at 21:19

## 2025-04-23 RX ADMIN — SODIUM CHLORIDE, PRESERVATIVE FREE 10 ML: 5 INJECTION INTRAVENOUS at 09:40

## 2025-04-23 RX ADMIN — ALBUTEROL SULFATE 2.5 MG: 2.5 SOLUTION RESPIRATORY (INHALATION) at 14:49

## 2025-04-23 RX ADMIN — BUDESONIDE INHALATION 500 MCG: 0.5 SUSPENSION RESPIRATORY (INHALATION) at 08:05

## 2025-04-23 RX ADMIN — PREDNISONE 40 MG: 20 TABLET ORAL at 09:39

## 2025-04-23 RX ADMIN — ASPIRIN 81 MG: 81 TABLET ORAL at 09:39

## 2025-04-23 RX ADMIN — ARFORMOTEROL TARTRATE 15 MCG: 15 SOLUTION RESPIRATORY (INHALATION) at 08:05

## 2025-04-23 RX ADMIN — Medication 4 ML: at 21:19

## 2025-04-23 RX ADMIN — ATORVASTATIN CALCIUM 20 MG: 20 TABLET, FILM COATED ORAL at 09:39

## 2025-04-23 ASSESSMENT — PAIN SCALES - GENERAL: PAINLEVEL_OUTOF10: 0

## 2025-04-23 NOTE — CARE COORDINATION
MSN, CM:  patient continues to require 40/50 Airvo.  Patient also continues to require IV lasix, PO steroids, and Neb tx.  Patient has no discharge needs at this time.  Case Management will continue to follow.

## 2025-04-23 NOTE — PROGRESS NOTES
Robson Cox  Admission Date: 4/16/2025         Daily Progress Note: 4/23/2025    The patient's chart is reviewed and the patient is discussed with the staff.    Background: Pt is a 63 yo male with history of tobacco abuse, COPD, pulmonary nodule, allergic rhinitis, and HTN who was seen by Dr. Wesley in the office on 4/16 with complains of worsening dyspnea, thoracic pain, and being confined to the bed since 4/11 after rib contusion. He was hypoxic to 67% on RA and required 6L to improve to 90%. Pt had a CXR which revealed reticulonodular and patchy infiltrates bilaterally. Pt was directly admitted to [facility] for treatment of acute respiratory failure and concern for PNA. Hospitalist was consulted to assume care of pt. Pt was placed on airvo. RVP negative. Pt had chest CTA which was negative for PE but revealed severe B centrilobular emphysema with RLL and LLL atelectasis with LLL consolidation. Sputum cx with light normal resp merna.   Subjective:     Pt is on airvo 40L at 60% with sat %. Repeat CRP/ESR/BNP and PCT all low. CXR stable.     Current Facility-Administered Medications   Medication Dose Route Frequency    glucose chewable tablet 16 g  4 tablet Oral PRN    dextrose bolus 10% 125 mL  125 mL IntraVENous PRN    Or    dextrose bolus 10% 250 mL  250 mL IntraVENous PRN    Glucagon Emergency KIT 1 mg  1 mg SubCUTAneous PRN    dextrose 10 % infusion   IntraVENous Continuous PRN    insulin lispro (HUMALOG,ADMELOG) injection vial 0-4 Units  0-4 Units SubCUTAneous 4x Daily AC & HS    albuterol (PROVENTIL) (2.5 MG/3ML) 0.083% nebulizer solution 2.5 mg  2.5 mg Nebulization TID RT    predniSONE (DELTASONE) tablet 40 mg  40 mg Oral Daily    sodium chloride (Inhalant) 3 % nebulizer solution 4 mL  4 mL Nebulization BID    furosemide (LASIX) injection 40 mg  40 mg IntraVENous Daily    aspirin EC tablet 81 mg  81 mg Oral Daily    atorvastatin (LIPITOR) tablet 20 mg  20 mg Oral Daily

## 2025-04-23 NOTE — PROGRESS NOTES
ACUTE PHYSICAL THERAPY GOALS:   (Developed with and agreed upon by patient and/or caregiver.)  Pt will ambulate 500 ft independently with use of breaks as needed in 7 therapy sessions.  Pt will tolerate 25 minutes of standing activity with LRAD/no device in 7 therapy sessions.  Pt will negotiate up and down 10 steps Terra with use of a handrail in 7 therapy sessions.  Pt will perform standing dynamic balance activities with minimal postural sway in 7 therapy sessions.  Pt will tolerate multiple sets and reps of BLE exercises in 7 therapy sessions.  Pt will participate in 1x 23' PT session while maintaining 02 sats above 90% on RA in 7 therapy sessions.      PHYSICAL THERAPY Initial Assessment and AM  (Link to Caseload Tracking: PT Visit Days : 1  Acknowledge Orders  Time In/Out  PT Charge Capture  Rehab Caseload Tracker    Robson Cox is a 64 y.o. male   PRIMARY DIAGNOSIS: Pneumonia of both lower lobes due to infectious organism  Acute hypoxic respiratory failure [J96.01]  Acute hypoxemic respiratory failure [J96.01]       Reason for Referral: Other abnormalities of gait and mobility (R26.89)  Inpatient: Payor: EMERITA MONCADA / Plan: [place] [place] CHO[place] / Product Type: *No Product type* /     ASSESSMENT:     REHAB RECOMMENDATIONS:   Recommendation to date pending progress:  Setting:  No further skilled physical therapy after discharge from hospital    Equipment:    To Be Determined     ASSESSMENT:  Mr. Cox is a 64 y.o. male presenting to PT following a hospitalization due to respiratory failure, now admitted with PNA. At baseline, pt ambulates independently, denies home O2 use and lives with his spouse in a mobile home with 2 VANITA. O2 sats were monitored throughout session, beginning on airvo 40L/60% with O2 sats reading 93% supine in bed. At time of initial evaluation, pt presents below baseline with deficits in activity tolerance limiting overall functional mobility. Pt moves from supine to  SUV  Occupation: Full time employment  Type of Occupation: Supervisor    OBJECTIVE:     PAIN: VITALS / O2: PRECAUTION / LINES / DRAINS:   Pre Treatment: Denies pain           Post Treatment: Denies pain   Vitals        Oxygen  O2 Therapy: Oxygen humidified  O2 Device: Heated high flow cannula  O2 Flow Rate (L/min): 40 L/min  FiO2 : 60 %   Continuous Pulse Oximetry and IV    RESTRICTIONS/PRECAUTIONS:                    GROSS EVALUATION:  Intact Impaired (Comments):   AROM [x]     PROM []    Strength []  WFL BLE   Balance [] Sitting - Static: Good  Sitting - Dynamic: Good  Standing - Static: Good  Standing - Dynamic: Fair   Posture [] N/A   Sensation [x]     Coordination [x]      Tone [x]     Edema [x]    Activity Tolerance []  Reliant on supplemental O2 for mobility.     []      COGNITION/  PERCEPTION: Intact Impaired (Comments):   Orientation [x]     Vision [x]     Hearing [x]     Cognition  [x]       MOBILITY: I Mod I S SBA CGA Min Mod Max Total  NT x2 Comments:   Bed Mobility    Rolling [] [] [] [] [] [] [] [] [] [x] []    Supine to Sit [] [x] [] [] [] [] [] [] [] [] []    Scooting [] [x] [] [] [] [] [] [] [] [] []    Sit to Supine [] [] [] [] [] [] [] [] [] [x] []    Transfers    Sit to Stand [] [] [x] [] [] [] [] [] [] [] []    Bed to Chair [] [] [x] [] [] [] [] [] [] [] []    Stand to Sit [] [] [x] [] [] [] [] [] [] [] []     [] [] [] [] [] [] [] [] [] [] []    I=Independent, Mod I=Modified Independent, S=Supervision, SBA=Standby Assistance, CGA=Contact Guard Assistance,   Min=Minimal Assistance, Mod=Moderate Assistance, Max=Maximal Assistance, Total=Total Assistance, NT=Not Tested    GAIT: I Mod I S SBA CGA Min Mod Max Total  NT x2 Comments:   Level of Assistance [] [] [x] [] [] [] [] [] [] [] []    Distance   SPT    DME None    Gait Quality Trunk sway increased    Weightbearing Status      Stairs      I=Independent, Mod I=Modified Independent, S=Supervision, SBA=Standby Assistance, CGA=Contact Guard  Assistance,   Min=Minimal Assistance, Mod=Moderate Assistance, Max=Maximal Assistance, Total=Total Assistance, NT=Not Tested    PLAN:   FREQUENCY AND DURATION: 3 times/week for duration of hospital stay or until stated goals are met, whichever comes first.    THERAPY PROGNOSIS: Good    PROBLEM LIST:   (Skilled intervention is medically necessary to address:)  Decreased ADL/Functional Activities  Decreased Activity Tolerance  Decreased Balance  Decreased Gait Ability INTERVENTIONS PLANNED:   (Benefits and precautions of physical therapy have been discussed with the patient.)  Self Care Training  Therapeutic Activity  Therapeutic Exercise/HEP  Neuromuscular Re-education  Gait Training  Education         TREATMENT:   EVALUATION: LOW COMPLEXITY: (Untimed Charge)  The initial evaluation charge encompasses clinical chart review, objective assessment, interpretation of assessment, and skilled monitoring of the patient's response to treatment in order to develop a plan of care.     TREATMENT:   Therapeutic Activity (15 Minutes): Therapeutic activity included Supine to Sit, Scooting, Transfer Training, Ambulation on level ground, Sitting balance , and Standing balance to improve functional Activity tolerance, Balance, Coordination, Mobility, Strength, and ROM.    TREATMENT GRID:  N/A    AFTER TREATMENT PRECAUTIONS: Bed/Chair Locked, Call light within reach, Chair, and Needs within reach    INTERDISCIPLINARY COLLABORATION:  RN/ PCT and PT/ PTA    EDUCATION:    Educated patient and/or family/caregiver on the following: Role of PT    TIME IN/OUT:  Time In: 0845  Time Out: 0903  Minutes: 18    Fransico Barrera PT

## 2025-04-23 NOTE — PROGRESS NOTES
Hospitalist Progress Note   Admit Date:  2025  3:14 PM   Name:  Robson Cox   Age:  64 y.o.  Sex:  male  :  1960   MRN:  [id number]   Room:  3/    Presenting/Chief Complaint: No chief complaint on file.     Reason(s) for Admission: Acute hypoxic respiratory failure [J96.01]  Acute hypoxemic respiratory failure [J96.01]     Hospital Day #7      Hospital Course:   64 y.o. male with history of allergic rhinitis, COPD, hypertension who was seen in his pulmonologist office on  due to feeling ill since .  He was recommended by the pulmonology service to the hospital following that office visit.  Hospitalist service was requested to assume primary management following the admission.  Patient has history of pulmonary nodules and moderately severe chronic obstructive pulmonary disease managed with Trelegy and albuterol. Patient denies any sick contacts. Has associated coughing, sneezing just been unchanged over the last 5 days. Patient adheres to his Trelegy regimen and albuterol. On presentation patient was hypoxemic 67% and required 6 L to get back to 90%.     Subjective & 24hr Events:   Patient seated upright in bed eating breakfast at time of visit.  He reports his breathing feels relatively comfortable at rest, stable versus recent days.    Assessment & Plan:     Bilateral lower lobe pneumonia, POA  Afebrile.  WBC remains mildly elevated secondary to ongoing steroids for COPD exacerbation.  Completed 5 days of IV ceftriaxone, on .  Received 3 days of IV azithromycin, completed .    COPD exacerbation/severe pulmonary emphysema  Remains on IV Solu-Medrol, arformoterol and budesonide twice daily, albuterol every 4 hours, hypertonic saline nebs twice daily.     Acute hypoxic respiratory failure  Remains on Airvo.  Continue to wean supplemental O2 off as tolerated.  Will need 6-minute walk test prior to discharge for anticipated need for supplemental oxygen on discharge.   Basophils Absolute 0.03 0.00 - 0.20 K/UL    Immature Granulocytes Absolute 0.24 0.0 - 0.5 K/UL   Basic Metabolic Panel w/ Reflex to MG    Collection Time: 04/22/25  4:04 AM   Result Value Ref Range    Sodium 137 136 - 145 mmol/L    Potassium 3.9 3.5 - 5.1 mmol/L    Chloride 96 (L) 98 - 107 mmol/L    CO2 29 20 - 29 mmol/L    Anion Gap 13 7 - 16 mmol/L    Glucose 213 (H) 70 - 99 mg/dL    BUN 30 (H) 8 - 23 MG/DL    Creatinine 1.07 0.80 - 1.30 MG/DL    Est, Glom Filt Rate 78 >60 ml/min/1.73m2    Calcium 9.1 8.8 - 10.2 MG/DL   Brain Natriuretic Peptide    Collection Time: 04/22/25  4:04 AM   Result Value Ref Range    NT Pro- 0 - 125 PG/ML   C-Reactive Protein    Collection Time: 04/22/25  4:04 AM   Result Value Ref Range    CRP <0.3 0.0 - 0.4 mg/dL   Procalcitonin    Collection Time: 04/22/25  4:04 AM   Result Value Ref Range    Procalcitonin 0.05 0.00 - 0.10 ng/mL   POCT Glucose    Collection Time: 04/22/25  7:23 AM   Result Value Ref Range    POC Glucose 113 (H) 65 - 100 mg/dL    Performed by: JwerPCT    POCT Glucose    Collection Time: 04/22/25 11:29 AM   Result Value Ref Range    POC Glucose 162 (H) 65 - 100 mg/dL    Performed by: RenaebarAmberPCT    Sedimentation Rate    Collection Time: 04/22/25  2:52 PM   Result Value Ref Range    Sed Rate, Automated 8 (L) 15 mm/hr   Echo (TTE) complete (PRN contrast/bubble/strain/3D)    Collection Time: 04/22/25  3:38 PM   Result Value Ref Range    Body Surface Area 2 m2    Fractional Shortening 2D 33 28 - 44 %    LV ESV Index A4C 20 mL/m2    LV EDV Index A4C 36 mL/m2    LV ESV Index A2C 23 mL/m2    LV EDV Index A2C 42 mL/m2    LVIDd Index 2.75 cm/m2    LVIDs Index 1.85 cm/m2    LV RWT Ratio 0.40     LV Mass 2D 242.0 (A) 88 - 224 g    LV Mass 2D Index 121.0 (A) 49 - 115 g/m2    MV E/A 0.49     E/E' Ratio (Averaged) 3.82     E/E' Lateral 2.83     E/E' Septal 4.80     LA Volume Index BP 15 (A) 16 - 34 ml/m2    LVOT Stroke Volume Index 26.4 mL/m2    LA Volume Index MOD  A2C 16 16 - 34 ml/m2    LA Volume Index MOD A4C 15 (A) 16 - 34 ml/m2    LA Size Index 2.00 cm/m2    LA/AO Root Ratio 1.08     Ao Root Index 1.85 cm/m2    AV Velocity Ratio 0.73     LVOT:AV VTI Index 0.80     KEELEY/BSA VTI 1.3 cm2/m2    KEELEY/BSA Peak Velocity 1.2 cm2/m2    MV:LVOT VTI Index 1.28     LV EDV A2C 84 mL    LV EDV A4C 71 mL    LV ESV A2C 46 mL    LV ESV A4C 39 mL    IVSd 1.1 (A) 0.6 - 1.0 cm    LVIDd 5.5 4.2 - 5.9 cm    LVIDs 3.7 cm    LVOT Diameter 2.0 cm    LVOT Mean Gradient 1 mmHg    LVOT Mean Gradient 1 mmHg    LVOT VTI 16.8 cm    LVOT Peak Velocity 0.8 m/s    LVOT Peak Gradient 3 mmHg    LVPWd 1.1 (A) 0.6 - 1.0 cm    LV E' Lateral Velocity 17.30 cm/s    LV E' Septal Velocity 10.20 cm/s    LV Ejection Fraction A2C 45 %    LV Ejection Fraction A4C 44 %    EF BP 55 55 - 100 %    LVOT Area 3.1 cm2    LVOT SV 52.8 ml    LA Minor Axis 4.5 cm    LA Major Axis 4.5 cm    LA Area 2C 13.4 cm2    LA Area 4C 13.5 cm2    LA Volume MOD A2C 32 18 - 58 mL    LA Volume MOD A4C 29 18 - 58 mL    LA Volume BP 30 18 - 58 mL    LA Diameter 4.0 cm    AV Mean Velocity 0.8 m/s    AV Mean Gradient 3 mmHg    AV VTI 21.1 cm    AV Peak Velocity 1.1 m/s    AV Peak Gradient 5 mmHg    AV Peak Gradient 5 mmHg    AV Area by VTI 2.5 cm2    AV Area by Peak Velocity 2.3 cm2    Aortic Root 3.7 cm    IVC Proxmal 1.2 cm    MV E Wave Deceleration Time 192.0 ms    MV A Velocity 1.01 m/s    MV E Velocity 0.49 m/s    MV Mean Gradient 1 mmHg    MV VTI 21.5 cm    MV Mean Velocity 0.4 m/s    MV Max Velocity 0.9 m/s    MV Peak Gradient 3 mmHg    MV Area by VTI 2.5 cm2    PV AT 95.0 ms    PV Max Velocity 0.8 m/s    PV Peak Gradient 3 mmHg    RV Basal Dimension 3.5 cm    RV Free Wall Peak S' 10.0 cm/s    TAPSE 1.7 >=1.7 cm    EF Physician 55 %   POCT Glucose    Collection Time: 04/22/25  3:55 PM   Result Value Ref Range    POC Glucose 145 (H) 65 - 100 mg/dL    Performed by: Anne Marie    POCT Glucose    Collection Time: 04/22/25  7:17 PM   Result

## 2025-04-24 ENCOUNTER — APPOINTMENT (OUTPATIENT)
Dept: NON INVASIVE DIAGNOSTICS | Age: 65
End: 2025-04-24
Attending: INTERNAL MEDICINE
Payer: COMMERCIAL

## 2025-04-24 LAB
ECHO BSA: 2 M2
EKG ATRIAL RATE: 79 BPM
EKG DIAGNOSIS: NORMAL
EKG P AXIS: -17 DEGREES
EKG P-R INTERVAL: 156 MS
EKG Q-T INTERVAL: 382 MS
EKG QRS DURATION: 88 MS
EKG QTC CALCULATION (BAZETT): 438 MS
EKG R AXIS: -83 DEGREES
EKG T AXIS: -17 DEGREES
EKG VENTRICULAR RATE: 79 BPM
EST. AVERAGE GLUCOSE BLD GHB EST-MCNC: 143 MG/DL
GLUCOSE BLD STRIP.AUTO-MCNC: 127 MG/DL (ref 65–100)
GLUCOSE BLD STRIP.AUTO-MCNC: 172 MG/DL (ref 65–100)
GLUCOSE BLD STRIP.AUTO-MCNC: 184 MG/DL (ref 65–100)
GLUCOSE BLD STRIP.AUTO-MCNC: 205 MG/DL (ref 65–100)
GLUCOSE BLD STRIP.AUTO-MCNC: 216 MG/DL (ref 65–100)
HBA1C MFR BLD: 6.6 % (ref 0–5.6)
SERVICE CMNT-IMP: ABNORMAL

## 2025-04-24 PROCEDURE — 2500000003 HC RX 250 WO HCPCS: Performed by: INTERNAL MEDICINE

## 2025-04-24 PROCEDURE — 6370000000 HC RX 637 (ALT 250 FOR IP): Performed by: FAMILY MEDICINE

## 2025-04-24 PROCEDURE — 83036 HEMOGLOBIN GLYCOSYLATED A1C: CPT

## 2025-04-24 PROCEDURE — 6360000002 HC RX W HCPCS: Performed by: FAMILY MEDICINE

## 2025-04-24 PROCEDURE — 93308 TTE F-UP OR LMTD: CPT

## 2025-04-24 PROCEDURE — 93010 ELECTROCARDIOGRAM REPORT: CPT | Performed by: INTERNAL MEDICINE

## 2025-04-24 PROCEDURE — 2700000000 HC OXYGEN THERAPY PER DAY

## 2025-04-24 PROCEDURE — 94640 AIRWAY INHALATION TREATMENT: CPT

## 2025-04-24 PROCEDURE — 1100000000 HC RM PRIVATE

## 2025-04-24 PROCEDURE — 6370000000 HC RX 637 (ALT 250 FOR IP): Performed by: INTERNAL MEDICINE

## 2025-04-24 PROCEDURE — 99232 SBSQ HOSP IP/OBS MODERATE 35: CPT | Performed by: INTERNAL MEDICINE

## 2025-04-24 PROCEDURE — 82962 GLUCOSE BLOOD TEST: CPT

## 2025-04-24 PROCEDURE — 93005 ELECTROCARDIOGRAM TRACING: CPT

## 2025-04-24 PROCEDURE — 93308 TTE F-UP OR LMTD: CPT | Performed by: INTERNAL MEDICINE

## 2025-04-24 PROCEDURE — 94761 N-INVAS EAR/PLS OXIMETRY MLT: CPT

## 2025-04-24 PROCEDURE — 6360000002 HC RX W HCPCS: Performed by: INTERNAL MEDICINE

## 2025-04-24 PROCEDURE — 2580000003 HC RX 258: Performed by: STUDENT IN AN ORGANIZED HEALTH CARE EDUCATION/TRAINING PROGRAM

## 2025-04-24 PROCEDURE — 36415 COLL VENOUS BLD VENIPUNCTURE: CPT

## 2025-04-24 PROCEDURE — 82668 ASSAY OF ERYTHROPOIETIN: CPT

## 2025-04-24 RX ADMIN — ATORVASTATIN CALCIUM 20 MG: 20 TABLET, FILM COATED ORAL at 08:23

## 2025-04-24 RX ADMIN — BUDESONIDE INHALATION 500 MCG: 0.5 SUSPENSION RESPIRATORY (INHALATION) at 19:54

## 2025-04-24 RX ADMIN — SODIUM CHLORIDE, PRESERVATIVE FREE 10 ML: 5 INJECTION INTRAVENOUS at 08:28

## 2025-04-24 RX ADMIN — ASPIRIN 81 MG: 81 TABLET ORAL at 08:23

## 2025-04-24 RX ADMIN — BUDESONIDE INHALATION 500 MCG: 0.5 SUSPENSION RESPIRATORY (INHALATION) at 07:26

## 2025-04-24 RX ADMIN — Medication 4 ML: at 19:54

## 2025-04-24 RX ADMIN — FUROSEMIDE 40 MG: 10 INJECTION, SOLUTION INTRAMUSCULAR; INTRAVENOUS at 08:23

## 2025-04-24 RX ADMIN — SODIUM CHLORIDE, PRESERVATIVE FREE 10 ML: 5 INJECTION INTRAVENOUS at 22:05

## 2025-04-24 RX ADMIN — INSULIN LISPRO 1 UNITS: 100 INJECTION, SOLUTION INTRAVENOUS; SUBCUTANEOUS at 22:05

## 2025-04-24 RX ADMIN — ALBUTEROL SULFATE 2.5 MG: 2.5 SOLUTION RESPIRATORY (INHALATION) at 14:13

## 2025-04-24 RX ADMIN — ALBUTEROL SULFATE 2.5 MG: 2.5 SOLUTION RESPIRATORY (INHALATION) at 19:54

## 2025-04-24 RX ADMIN — ARFORMOTEROL TARTRATE 15 MCG: 15 SOLUTION RESPIRATORY (INHALATION) at 07:26

## 2025-04-24 RX ADMIN — ARFORMOTEROL TARTRATE 15 MCG: 15 SOLUTION RESPIRATORY (INHALATION) at 19:54

## 2025-04-24 RX ADMIN — ALBUTEROL SULFATE 2.5 MG: 2.5 SOLUTION RESPIRATORY (INHALATION) at 07:26

## 2025-04-24 RX ADMIN — INSULIN LISPRO 1 UNITS: 100 INJECTION, SOLUTION INTRAVENOUS; SUBCUTANEOUS at 11:42

## 2025-04-24 RX ADMIN — ENOXAPARIN SODIUM 40 MG: 100 INJECTION SUBCUTANEOUS at 17:35

## 2025-04-24 RX ADMIN — PREDNISONE 40 MG: 20 TABLET ORAL at 08:23

## 2025-04-24 RX ADMIN — Medication 4 ML: at 07:26

## 2025-04-24 NOTE — PROGRESS NOTES
Nutrition Assessment  Assessment Type: Initial  Reason for visit:  Length of Stay  Malnutrition Screening Tool Score:      Nutrition Intervention:   Food and/or Nutrient Delivery:   Meals and Snacks:  Diet: Add 5 carbohydrate choice  Medical Food Supplements:   Medical food supplement therapy:  None Not indicated     Malnutrition Assessment:  Academy/A.S.P.E[facility]N Clinical Malnutrition Criteria  Malnutrition Status: At risk for malnutrition (~4% wt loss in 1 week, increased energy needs d/t COPD)  Nutrition Focused Physical Exam: Only remarkable for mild orbital wasting    Nutrition Assessment:  Food/Nutrition Related History: Pt states he eats well at baseline and has a good appetite.  For breakfast he will eat a pack of peanut butter and cheese crackers, for lunch he will eat a turkey or ham sandwich and sometimes has chips on the side, and dinner typically consists of chicken or fish with either vegetables or french fries.         Weight History:   Pt states he has noticed some mild wt loss since admission which he attributes to not eating for 4 days pta d/t feeling sick with pneumonia.  4/16/25 185# (Pulmonology), 12/16/24 194# (Pulmonology), 5/15/24 193# (Pulmonology), 4/17/24 192# (FM)  4/24 current wt: 178# (inpatient bed scale)  ~4% wt loss in 1 week which is clinically significant    Nutrition Background:       PMH includes allergic rhinitis, COPD, HTN.  Presented to pulmonologist d/t feeling ill since 4/11 and was recommended by pulmonology service to the hospital following that office visit.  Admitted with acute hypoxic respiratory failure, acute hypoxemic respiratory failure, and pneumonia of both lower lobes d/t infectious organism.    Nutrition Monitoring/Evaluation:  Pt seen reclined in bed on historian nikki. Reports a good appetite and states he has been eating very well.  Pt states he ate all of his dinner last night which consisted of pasta and sauce with garlic bread.  For breakfast he ate all of

## 2025-04-24 NOTE — CONSULTS
[facility] Cardiology Initial Cardiac Evaluation      Date of  Admission: 4/16/2025  3:14 PM     Primary Care Physician: Howard Saenz MD  Primary Cardiologist: Dr. Del Valle  Referring Physician: Dr. Wesley  Supervising Physician: Dr. Chappell    CC/Reason for evaluation: Interatrial shunt    HPI:  Robson Cox is a 64 y.o. male with prior history of CAD (CT calcium score 465), diabetes, COPD, pulmonary nodule, HTN who was admitted with worsening dyspnea, thoracic pain after recent rib contusion.  He is found to be hypoxic to 67% on room air.  Chest x-ray showed reticulonodular and patchy infiltrates bilaterally.  He was admitted for acute respiratory failure and treatment for pneumonia 8 days ago.  CT PE study negative for PE however did show severe B centrilobular emphysema.  He was admitted for COPD exacerbation and treatment of for pneumonia with IV azithromycin/ceftriaxone as well as oral prednisone.    The patient did have paradoxical low oxygen saturation despite increasing to 100% FiO2 from 21% on Airvo.  Pulmonary was concern for possible shunting.    Initial echo ordered for shunting which showed normal LVEF however bubble study was not done at that time. Limited echocardiogram was performed today with bubble study which showed interatrial shunting with right-to-left shunt more prominent with Valsalva.    On exam the patient is sitting up in bed in no acute distress.  Has no current complaints.  He states he has mild chest discomfort with call for deep inspiration across his lower ribs.  Denies any exertional shortness of breath.  He has no history of TIA/CVA.      Cardiology consulted for evaluation of interseptal shunt and possible closure.      04/16/25    ECHO (TTE) LIMITED (PRN CONTRAST/BUBBLE/STRAIN/3D) 04/24/2025  2:22 PM (Final)    Interpretation Summary    Interatrial Septum: Grade III Positive (>20 bubbles). Agitated saline study was suggestive of intracardiac shunt across the atrial septum.

## 2025-04-24 NOTE — PROGRESS NOTES
on discharge.  Pulmonology following.  Persistent severe hypoxemia thought secondary to pneumonia on baseline of severe emphysema.    Steroid-induced hyperglycemia (A1c 5.7)  Continue LDSSI.    Hypokalemia  Replaced yesterday.  Repeat BMP in the a.m.      Anticipated Discharge Arrangements:   Home    PT/OT evals ordered?  Not ordered; patient not expected to need rehab  Diet:  ADULT DIET; Regular  VTE prophylaxis: Lovenox  Code status: Full Code      Non-peripheral Lines and Tubes (if present):          Telemetry (if present):           Hospital Problems:  Principal Problem:    Pneumonia of both lower lobes due to infectious organism  Active Problems:    Pulmonary emphysema (HCC)    Acute hypoxemic respiratory failure (HCC)    Chronic obstructive pulmonary disease with acute lower respiratory infection (HCC)  Resolved Problems:    * No resolved hospital problems. *      Objective:   Patient Vitals for the past 24 hrs:   Temp Pulse Resp BP SpO2   04/24/25 0322 -- 62 18 -- 92 %   04/23/25 2330 -- 63 20 -- 91 %   04/23/25 2314 97.3 °F (36.3 °C) 68 16 115/76 92 %   04/23/25 2119 -- 69 16 -- 95 %   04/23/25 1940 97.3 °F (36.3 °C) 72 17 135/85 94 %   04/23/25 1524 99.9 °F (37.7 °C) 76 20 109/81 91 %   04/23/25 1450 -- 74 16 -- 91 %   04/23/25 1120 97.7 °F (36.5 °C) 80 20 104/86 93 %   04/23/25 0735 98.1 °F (36.7 °C) 71 14 (!) 121/94 92 %       Oxygen Therapy  SpO2: 92 %  Pulse Oximeter Device Mode: Continuous  Pulse Oximeter Device Location: Forehead  O2 Device: Heated high flow cannula  Skin Assessment: Clean, dry, & intact  FiO2 : 60 %  O2 Flow Rate (L/min): 40 L/min  Blood Gas  Performed?: Yes  Quintin's Test #1: Collateral flow confirmed  Site #1: Left Radial  Site Prepped #1: Yes  Number of Attempts #1: 2  Pressure Held #1: Yes  Complications #1: None  Post-procedure #1: Standard  Specimen Status #1: Point of care  How Tolerated?: Tolerated well    Estimated body mass index is 20.97 kg/m² as calculated from the  following:    Height as of this encounter: 1.753 m (5' 9\").    Weight as of this encounter: 64.4 kg (141 lb 15.6 oz).    Intake/Output Summary (Last 24 hours) at 4/24/2025 0652  Last data filed at 4/23/2025 1524  Gross per 24 hour   Intake 780 ml   Output 1360 ml   Net -580 ml         Physical Exam:   General:    Well nourished middle-aged male seated upright in bed.  NAD.  Head:  Normocephalic, atraumatic.  Eyes:  Sclerae appear normal.  Pupils equally round.  ENT:  Nares appear normal.  Moist oral mucosa.  Neck:  No restricted ROM.  Trachea midline.   CV:   RRR.  No m/r/g.  No jugular venous distension.  No LE edema.  Lungs:   Faint end expiratory wheeze bilaterally.  No increased work of breathing or accessory muscle use.  Symmetric expansion.  On Airvo, FiO2 57% / 40 L, SpO2 95%.  Abdomen:   Soft, nontender, nondistended.  Skin:     No rashes.  Normal coloration.   Warm and dry.    Neuro:  AAOx3.  CN II-XII grossly intact.    Psych:  Normal mood and affect.      I have personally reviewed labs and tests:  Recent Labs:  Recent Results (from the past 48 hours)   POCT Glucose    Collection Time: 04/22/25  7:23 AM   Result Value Ref Range    POC Glucose 113 (H) 65 - 100 mg/dL    Performed by: KielAmbatureerANITRAT    POCT Glucose    Collection Time: 04/22/25 11:29 AM   Result Value Ref Range    POC Glucose 162 (H) 65 - 100 mg/dL    Performed by: RenaeThe Online Backup CompanyerPCT    Sedimentation Rate    Collection Time: 04/22/25  2:52 PM   Result Value Ref Range    Sed Rate, Automated 8 (L) 15 mm/hr   Echo (TTE) complete (PRN contrast/bubble/strain/3D)    Collection Time: 04/22/25  3:38 PM   Result Value Ref Range    Body Surface Area 2 m2    Fractional Shortening 2D 33 28 - 44 %    LV ESV Index A4C 20 mL/m2    LV EDV Index A4C 36 mL/m2    LV ESV Index A2C 23 mL/m2    LV EDV Index A2C 42 mL/m2    LVIDd Index 2.75 cm/m2    LVIDs Index 1.85 cm/m2    LV RWT Ratio 0.40     LV Mass 2D 242.0 (A) 88 - 224 g    LV Mass 2D Index 121.0 (A) 49 - 115  Usama(StudentNurseHarmonized    POCT Glucose    Collection Time: 04/23/25 11:19 AM   Result Value Ref Range    POC Glucose 147 (H) 65 - 100 mg/dL    Performed by: Usama(StudentNurseHarmonized    POCT Glucose    Collection Time: 04/23/25  4:30 PM   Result Value Ref Range    POC Glucose 160 (H) 65 - 100 mg/dL    Performed by: Usama(StudentNurseHarmonized    POCT Glucose    Collection Time: 04/23/25  8:25 PM   Result Value Ref Range    POC Glucose 198 (H) 65 - 100 mg/dL    Performed by: Janice    POCT Glucose    Collection Time: 04/23/25 10:20 PM   Result Value Ref Range    POC Glucose 133 (H) 65 - 100 mg/dL    Performed by: Benitez        Current Meds:  Current Facility-Administered Medications   Medication Dose Route Frequency    glucose chewable tablet 16 g  4 tablet Oral PRN    dextrose bolus 10% 125 mL  125 mL IntraVENous PRN    Or    dextrose bolus 10% 250 mL  250 mL IntraVENous PRN    Glucagon Emergency KIT 1 mg  1 mg SubCUTAneous PRN    dextrose 10 % infusion   IntraVENous Continuous PRN    insulin lispro (HUMALOG,ADMELOG) injection vial 0-4 Units  0-4 Units SubCUTAneous 4x Daily AC & HS    albuterol (PROVENTIL) (2.5 MG/3ML) 0.083% nebulizer solution 2.5 mg  2.5 mg Nebulization TID RT    predniSONE (DELTASONE) tablet 40 mg  40 mg Oral Daily    sodium chloride (Inhalant) 3 % nebulizer solution 4 mL  4 mL Nebulization BID    furosemide (LASIX) injection 40 mg  40 mg IntraVENous Daily    aspirin EC tablet 81 mg  81 mg Oral Daily    atorvastatin (LIPITOR) tablet 20 mg  20 mg Oral Daily    butalbital-acetaminophen-caffeine (FIORICET, ESGIC) per tablet 1 tablet  1 tablet Oral Q4H PRN    sodium chloride flush 0.9 % injection 5-40 mL  5-40 mL IntraVENous 2 times per day    sodium chloride flush 0.9 % injection 5-40 mL  5-40 mL IntraVENous PRN    0.9 % sodium chloride infusion   IntraVENous PRN    enoxaparin (LOVENOX) injection 40 mg  40 mg SubCUTAneous QPM    ondansetron

## 2025-04-24 NOTE — PROGRESS NOTES
Robson Cox  Admission Date: 4/16/2025         Daily Progress Note: 4/24/2025    The patient's chart is reviewed and the patient is discussed with the staff.    Background: Pt is a 63 yo male with history of tobacco abuse, COPD, pulmonary nodule, allergic rhinitis, and HTN who was seen by Dr. Wesley in the office on 4/16 with complains of worsening dyspnea, thoracic pain, and being confined to the bed since 4/11 after rib contusion. He was hypoxic to 67% on RA and required 6L to improve to 90%. Pt had a CXR which revealed reticulonodular and patchy infiltrates bilaterally. Pt was directly admitted to [facility] for treatment of acute respiratory failure and concern for PNA. Hospitalist was consulted to assume care of pt. Pt was placed on airvo. RVP negative. Pt had chest CTA which was negative for PE but revealed severe B centrilobular emphysema with RLL and LLL atelectasis with LLL consolidation. Sputum cx with light normal resp merna.  Repeat CRP/ESR/BNP and PCT all low.   Subjective:     States he is feeling better. Denies any SOB or SERNA. Having dry cough. Remains on airvo.    Current Facility-Administered Medications   Medication Dose Route Frequency    glucose chewable tablet 16 g  4 tablet Oral PRN    dextrose bolus 10% 125 mL  125 mL IntraVENous PRN    Or    dextrose bolus 10% 250 mL  250 mL IntraVENous PRN    Glucagon Emergency KIT 1 mg  1 mg SubCUTAneous PRN    dextrose 10 % infusion   IntraVENous Continuous PRN    insulin lispro (HUMALOG,ADMELOG) injection vial 0-4 Units  0-4 Units SubCUTAneous 4x Daily AC & HS    albuterol (PROVENTIL) (2.5 MG/3ML) 0.083% nebulizer solution 2.5 mg  2.5 mg Nebulization TID RT    predniSONE (DELTASONE) tablet 40 mg  40 mg Oral Daily    sodium chloride (Inhalant) 3 % nebulizer solution 4 mL  4 mL Nebulization BID    furosemide (LASIX) injection 40 mg  40 mg IntraVENous Daily    aspirin EC tablet 81 mg  81 mg Oral Daily    atorvastatin (LIPITOR) tablet 20 mg   respiratory failure  Plan: wean O2 as tolerated, on airvo 40L/57% with SpO2 93-95%.   Repeat CRP/ESR/BNP and PCT all low. Polycythemia yesterday on labs. Erythropoietin in process.    Chronic obstructive pulmonary disease with acute lower respiratory infection  Plan: completed abx, continue nebs and steroids.     More than 50% of the time documented was spent in face-to-face contact with the patient and in the care of the patient on the floor/unit where the patient is located.    In this split/shared evaluation I performed performed a medically appropriate history and exam, counseled and educated the patient and/or family member, documented information in EMR, and coordinated care. which accounted for 14 clinical time.     Kira Benites APRN - CNP    ATTENDING ADDENDUM:    In this split/shared evaluation I performed reviewed the patients's H&P, available images, labs, cultures., discussed case in detail with NPP, performed a medically appropriate history and exam, counseled and educated the patient and/or family member, ordered and/or reviewed medications, tests or procedures, documented information in EMR, independently interpreted images, and coordinated care. which accounted for 15 minutes clinical time.     Impression: 65 yo with emphysema, prior tobacco abuse, HTN who presented to the office 4/16 with acute respiratory failure requiring 6L O2. Pt was directly admitted and started on IV abx. Chest CTA negative for PE but revealed severe emphysematous changes with RLL/LLL atelectasis and LLL infiltrate. I turned his airvo all the way down to 21% 40L and his sats were 87-90%, turned up to 100% he goes up to 94%. I think he is shunting. He feels well. I ordered an echo with bubble earlier but the bubble study didn't get done, I will order this specifically limited. We can try him on 4L NC and accept O2 sats 87% or better as long as his dyspne is controlled. He feels well.        Leslie Wesley MD

## 2025-04-24 NOTE — PLAN OF CARE
Problem: Respiratory - Adult  Goal: Achieves optimal ventilation and oxygenation  4/24/2025 0732 by Bharati Paul, RCMYLENE  Outcome: Progressing  Flowsheets (Taken 4/24/2025 0732)  Achieves optimal ventilation and oxygenation:   Assess for changes in respiratory status   Respiratory therapy support as indicated   Position to facilitate oxygenation and minimize respiratory effort   Assess and instruct to report shortness of breath or any respiratory difficulty   Encourage broncho-pulmonary hygiene including cough, deep breathe, incentive spirometry   Assess for changes in mentation and behavior

## 2025-04-24 NOTE — PROGRESS NOTES
04/23/25 2119   NIV Type   NIV Started/Stopped Off   Assessment   Pulse 69   Respirations 16   SpO2 95 %   Breath Sounds   Respiratory Pattern Regular   Settings/Measurements   O2 Flow Rate (L/min) 40 L/min   FiO2  60 %     V60 set up in patient room.  At this time, he would prefer to sleep with heated high flow oxygen via nasal cannula.  If patient changes his mind, he will have RN contact RT.

## 2025-04-25 PROBLEM — Q24.8 INTERATRIAL CARDIAC SHUNT: Status: ACTIVE | Noted: 2025-04-25

## 2025-04-25 LAB
ANION GAP SERPL CALC-SCNC: 11 MMOL/L (ref 7–16)
BASOPHILS # BLD: 0.09 K/UL (ref 0–0.2)
BASOPHILS NFR BLD: 0.4 % (ref 0–2)
BUN SERPL-MCNC: 32 MG/DL (ref 8–23)
CALCIUM SERPL-MCNC: 9 MG/DL (ref 8.8–10.2)
CHLORIDE SERPL-SCNC: 96 MMOL/L (ref 98–107)
CO2 SERPL-SCNC: 30 MMOL/L (ref 20–29)
CREAT SERPL-MCNC: 0.91 MG/DL (ref 0.8–1.3)
DIFFERENTIAL METHOD BLD: ABNORMAL
EOSINOPHIL # BLD: 0.15 K/UL (ref 0–0.8)
EOSINOPHIL NFR BLD: 0.7 % (ref 0.5–7.8)
EPO SERPL-ACNC: 5.4 MIU/ML (ref 2.6–18.5)
ERYTHROCYTE [DISTWIDTH] IN BLOOD BY AUTOMATED COUNT: 12.4 % (ref 11.9–14.6)
GLUCOSE BLD STRIP.AUTO-MCNC: 101 MG/DL (ref 65–100)
GLUCOSE BLD STRIP.AUTO-MCNC: 186 MG/DL (ref 65–100)
GLUCOSE BLD STRIP.AUTO-MCNC: 194 MG/DL (ref 65–100)
GLUCOSE BLD STRIP.AUTO-MCNC: 212 MG/DL (ref 65–100)
GLUCOSE SERPL-MCNC: 122 MG/DL (ref 70–99)
HCT VFR BLD AUTO: 50.1 % (ref 41.1–50.3)
HGB BLD-MCNC: 17 G/DL (ref 13.6–17.2)
IMM GRANULOCYTES # BLD AUTO: 0.91 K/UL (ref 0–0.5)
IMM GRANULOCYTES NFR BLD AUTO: 4.3 % (ref 0–5)
LYMPHOCYTES # BLD: 2.48 K/UL (ref 0.5–4.6)
LYMPHOCYTES NFR BLD: 11.8 % (ref 13–44)
MAGNESIUM SERPL-MCNC: 2.3 MG/DL (ref 1.8–2.4)
MCH RBC QN AUTO: 32.6 PG (ref 26.1–32.9)
MCHC RBC AUTO-ENTMCNC: 33.9 G/DL (ref 31.4–35)
MCV RBC AUTO: 96 FL (ref 82–102)
MONOCYTES # BLD: 1.4 K/UL (ref 0.1–1.3)
MONOCYTES NFR BLD: 6.7 % (ref 4–12)
NEUTS SEG # BLD: 15.95 K/UL (ref 1.7–8.2)
NEUTS SEG NFR BLD: 76.1 % (ref 43–78)
NRBC # BLD: 0 K/UL (ref 0–0.2)
PLATELET # BLD AUTO: 289 K/UL (ref 150–450)
PMV BLD AUTO: 8.5 FL (ref 9.4–12.3)
POTASSIUM SERPL-SCNC: 3.2 MMOL/L (ref 3.5–5.1)
RBC # BLD AUTO: 5.22 M/UL (ref 4.23–5.6)
SERVICE CMNT-IMP: ABNORMAL
SODIUM SERPL-SCNC: 136 MMOL/L (ref 136–145)
WBC # BLD AUTO: 21 K/UL (ref 4.3–11.1)

## 2025-04-25 PROCEDURE — 80048 BASIC METABOLIC PNL TOTAL CA: CPT

## 2025-04-25 PROCEDURE — 1100000000 HC RM PRIVATE

## 2025-04-25 PROCEDURE — 83735 ASSAY OF MAGNESIUM: CPT

## 2025-04-25 PROCEDURE — 2580000003 HC RX 258: Performed by: STUDENT IN AN ORGANIZED HEALTH CARE EDUCATION/TRAINING PROGRAM

## 2025-04-25 PROCEDURE — 6370000000 HC RX 637 (ALT 250 FOR IP): Performed by: INTERNAL MEDICINE

## 2025-04-25 PROCEDURE — 99232 SBSQ HOSP IP/OBS MODERATE 35: CPT | Performed by: INTERNAL MEDICINE

## 2025-04-25 PROCEDURE — 6370000000 HC RX 637 (ALT 250 FOR IP): Performed by: FAMILY MEDICINE

## 2025-04-25 PROCEDURE — 6360000002 HC RX W HCPCS: Performed by: INTERNAL MEDICINE

## 2025-04-25 PROCEDURE — 94761 N-INVAS EAR/PLS OXIMETRY MLT: CPT

## 2025-04-25 PROCEDURE — 94640 AIRWAY INHALATION TREATMENT: CPT

## 2025-04-25 PROCEDURE — 2500000003 HC RX 250 WO HCPCS: Performed by: INTERNAL MEDICINE

## 2025-04-25 PROCEDURE — 36415 COLL VENOUS BLD VENIPUNCTURE: CPT

## 2025-04-25 PROCEDURE — 2700000000 HC OXYGEN THERAPY PER DAY

## 2025-04-25 PROCEDURE — 6360000002 HC RX W HCPCS: Performed by: FAMILY MEDICINE

## 2025-04-25 PROCEDURE — 82962 GLUCOSE BLOOD TEST: CPT

## 2025-04-25 PROCEDURE — 85025 COMPLETE CBC W/AUTO DIFF WBC: CPT

## 2025-04-25 RX ORDER — IPRATROPIUM BROMIDE AND ALBUTEROL SULFATE 2.5; .5 MG/3ML; MG/3ML
1 SOLUTION RESPIRATORY (INHALATION) EVERY 4 HOURS PRN
Qty: 60 EACH | Refills: 2 | Status: SHIPPED | OUTPATIENT
Start: 2025-04-25

## 2025-04-25 RX ORDER — PREDNISONE 20 MG/1
TABLET ORAL
Qty: 17 TABLET | Refills: 0 | Status: SHIPPED | OUTPATIENT
Start: 2025-04-26 | End: 2025-05-14

## 2025-04-25 RX ORDER — POTASSIUM CHLORIDE 1500 MG/1
40 TABLET, EXTENDED RELEASE ORAL ONCE
Status: COMPLETED | OUTPATIENT
Start: 2025-04-25 | End: 2025-04-25

## 2025-04-25 RX ADMIN — Medication 4 ML: at 20:55

## 2025-04-25 RX ADMIN — INSULIN LISPRO 1 UNITS: 100 INJECTION, SOLUTION INTRAVENOUS; SUBCUTANEOUS at 20:00

## 2025-04-25 RX ADMIN — SODIUM CHLORIDE, PRESERVATIVE FREE 10 ML: 5 INJECTION INTRAVENOUS at 20:12

## 2025-04-25 RX ADMIN — Medication 4 ML: at 07:16

## 2025-04-25 RX ADMIN — ASPIRIN 81 MG: 81 TABLET ORAL at 08:51

## 2025-04-25 RX ADMIN — INSULIN LISPRO 1 UNITS: 100 INJECTION, SOLUTION INTRAVENOUS; SUBCUTANEOUS at 16:49

## 2025-04-25 RX ADMIN — BUDESONIDE INHALATION 500 MCG: 0.5 SUSPENSION RESPIRATORY (INHALATION) at 20:54

## 2025-04-25 RX ADMIN — ARFORMOTEROL TARTRATE 15 MCG: 15 SOLUTION RESPIRATORY (INHALATION) at 07:15

## 2025-04-25 RX ADMIN — ALBUTEROL SULFATE 2.5 MG: 2.5 SOLUTION RESPIRATORY (INHALATION) at 14:53

## 2025-04-25 RX ADMIN — ARFORMOTEROL TARTRATE 15 MCG: 15 SOLUTION RESPIRATORY (INHALATION) at 20:54

## 2025-04-25 RX ADMIN — POTASSIUM CHLORIDE 40 MEQ: 1500 TABLET, EXTENDED RELEASE ORAL at 08:51

## 2025-04-25 RX ADMIN — ALBUTEROL SULFATE 2.5 MG: 2.5 SOLUTION RESPIRATORY (INHALATION) at 20:55

## 2025-04-25 RX ADMIN — BUDESONIDE INHALATION 500 MCG: 0.5 SUSPENSION RESPIRATORY (INHALATION) at 07:16

## 2025-04-25 RX ADMIN — FUROSEMIDE 40 MG: 10 INJECTION, SOLUTION INTRAMUSCULAR; INTRAVENOUS at 08:52

## 2025-04-25 RX ADMIN — SODIUM CHLORIDE, PRESERVATIVE FREE 10 ML: 5 INJECTION INTRAVENOUS at 08:52

## 2025-04-25 RX ADMIN — ALBUTEROL SULFATE 2.5 MG: 2.5 SOLUTION RESPIRATORY (INHALATION) at 07:15

## 2025-04-25 RX ADMIN — INSULIN LISPRO 1 UNITS: 100 INJECTION, SOLUTION INTRAVENOUS; SUBCUTANEOUS at 11:42

## 2025-04-25 RX ADMIN — ATORVASTATIN CALCIUM 20 MG: 20 TABLET, FILM COATED ORAL at 08:51

## 2025-04-25 RX ADMIN — ENOXAPARIN SODIUM 40 MG: 100 INJECTION SUBCUTANEOUS at 16:49

## 2025-04-25 RX ADMIN — PREDNISONE 40 MG: 20 TABLET ORAL at 08:51

## 2025-04-25 NOTE — CARE COORDINATION
MSN, CM:  patient required 10L with ambulation today.  Will continue to wean oxygen needs.  Case Management will continue to follow.

## 2025-04-25 NOTE — PROGRESS NOTES
Physical Therapy Note:    Attempted to see patient this AM for physical therapy treatment  session. Patient using restroom at time of attempt. Will follow and re-attempt as schedule permits/patient available. Thank you,    Dorcas Vasques, PT     Rehab Caseload Tracker

## 2025-04-25 NOTE — PROGRESS NOTES
Robson Cox  Admission Date: 4/16/2025         Daily Progress Note: 4/25/2025    The patient's chart is reviewed and the patient is discussed with the staff.    Background: Pt is a 63 yo male with history of tobacco abuse, COPD, pulmonary nodule, allergic rhinitis, and HTN who was seen by Dr. Wesley in the office on 4/16 with complains of worsening dyspnea, thoracic pain, and being confined to the bed since 4/11 after rib contusion. He was hypoxic to 67% on RA and required 6L to improve to 90%. Pt had a CXR which revealed reticulonodular and patchy infiltrates bilaterally. Pt was directly admitted to [facility] for treatment of acute respiratory failure and concern for PNA. Hospitalist was consulted to assume care of pt. Pt was placed on airvo. RVP negative. Pt had chest CTA which was negative for PE but revealed severe B centrilobular emphysema with RLL and LLL atelectasis with LLL consolidation. Sputum cx with light normal resp merna.  Repeat CRP/ESR/BNP and PCT all low. TTE concerning for possible intracardiac shunt across the atrial septum with R-->L shunt but Dr. Chappell reviewed echo and did not feel like it was suggestive of a shunt.   Subjective:     Pt is on 4L O2 sat %. I decreased his O2 to 3L O2.   Pt denies cough or dyspnea. Pt does have some pain in RUQ at times.     Current Facility-Administered Medications   Medication Dose Route Frequency    potassium chloride (KLOR-CON M) extended release tablet 40 mEq  40 mEq Oral Once    glucose chewable tablet 16 g  4 tablet Oral PRN    dextrose bolus 10% 125 mL  125 mL IntraVENous PRN    Or    dextrose bolus 10% 250 mL  250 mL IntraVENous PRN    Glucagon Emergency KIT 1 mg  1 mg SubCUTAneous PRN    dextrose 10 % infusion   IntraVENous Continuous PRN    insulin lispro (HUMALOG,ADMELOG) injection vial 0-4 Units  0-4 Units SubCUTAneous 4x Daily AC & HS    albuterol (PROVENTIL) (2.5 MG/3ML) 0.083% nebulizer solution 2.5 mg  2.5 mg Nebulization  up to 94%. He seemed to have shunt physiology on AirVo, now weaned to 4L. He still was titrated up to 10L with ambulation today. His echo was read as a grade 3 shunt, but I talked with Dr. Thomas yesterday and really appears to be minimal and non-physiologic and would not benefit from closure. I will get him a pendant oximizer and we can reattempt ambulatory assessment tomorrow. Hopefully home soon with O2 for clinic follow up. I would wonder if he would be a candidate for endobronchial valves for severe upper lobe bilateral emphysema. This would have to be evaluated as an outpatient.     Leslie Wesley MD

## 2025-04-25 NOTE — DISCHARGE SUMMARY
Hospitalist Discharge Summary   Admit Date:  2025  3:14 PM   DC Note date: 2025  Name:  Robson Cox   Age:  64 y.o.  Sex:  male  :  1960   MRN:  [id number]   Room:  [facility]  PCP:  Howard Saenz MD    Presenting Complaint: No chief complaint on file.     Initial Admission Diagnosis: Acute hypoxic respiratory failure (HCC) [J96.01]  Acute hypoxemic respiratory failure (HCC) [J96.01]     Problem List for this Hospitalization (present on admission):    Principal Problem:    Pneumonia of both lower lobes due to infectious organism  Active Problems:    Pulmonary emphysema (HCC)    Acute hypoxemic respiratory failure (HCC)    Chronic obstructive pulmonary disease with acute lower respiratory infection (HCC)    Interatrial cardiac shunt  Resolved Problems:    * No resolved hospital problems. *      Hospital Course:  64 y.o. male with history of allergic rhinitis, COPD, hypertension who was seen in his pulmonologist office on  due to feeling ill since . He was recommended by the pulmonology service to the hospital following that office visit. Hospitalist service was requested to assume primary management following the admission. Patient has history of pulmonary nodules and moderately severe chronic obstructive pulmonary disease managed with Trelegy and albuterol. Patient denies any sick contacts. Has associated coughing, sneezing just been unchanged over the last 5 days. Patient adheres to his Trelegy regimen and albuterol. On presentation patient was hypoxemic 67% and required 6 L to get back to 90%.     ------------------------------------------------------------------------------------  Bilateral lower lobe pneumonia, POA  Afebrile.  WBC remains mildly elevated secondary to ongoing steroids for COPD exacerbation.  Completed 5 days of IV ceftriaxone, on .  Received 3 days of IV azithromycin, completed .     COPD exacerbation/severe pulmonary emphysema  Improving.  Case discussed

## 2025-04-25 NOTE — PROGRESS NOTES
04/25/25 1207   Resting (Room Air)   SpO2 82   HR 87   Resting (On O2)   SpO2 92   HR 78   O2 Device Nasal cannula   O2 Flow Rate (l/min) 4 l/min   During Walk (On O2)   SpO2 85   HR 90   O2 Device Nasal cannula   O2 Flow Rate (l/min) 4 l/min   Need Additional O2 Flow Rate Rows Yes   O2 Flow Rate (l/min) 6 l/min   O2 Saturation 86   O2 Flow Rate (l/min) 8 l/min   O2 Saturation 87   O2 Flow Rate (l/min) 10 l/min   O2 Saturation 90   Walk/Assistance Device Ambulation   Rate of Dyspnea 1   Symptoms Dizziness;Shortness of breath   After Walk   SpO2 91   HR 82   O2 Device Nasal cannula   O2 Flow Rate (l/min) 6 l/min   Rate of Dyspnea 1   Comments Only able to ambulate patient from the bed to the chair and back.   Does the Patient Qualify for Home O2 Yes   Liter Flow at Rest 4   Liter Flow on Exertion 10   Does the Patient Need Portable Oxygen Tanks Yes

## 2025-04-26 VITALS
OXYGEN SATURATION: 91 % | HEIGHT: 69 IN | HEART RATE: 71 BPM | DIASTOLIC BLOOD PRESSURE: 76 MMHG | WEIGHT: 178.57 LBS | RESPIRATION RATE: 18 BRPM | SYSTOLIC BLOOD PRESSURE: 106 MMHG | TEMPERATURE: 97.5 F | BODY MASS INDEX: 26.45 KG/M2

## 2025-04-26 LAB
ANION GAP SERPL CALC-SCNC: 14 MMOL/L (ref 7–16)
BASOPHILS # BLD: 0.1 K/UL (ref 0–0.2)
BASOPHILS NFR BLD: 0.5 % (ref 0–2)
BUN SERPL-MCNC: 33 MG/DL (ref 8–23)
CALCIUM SERPL-MCNC: 9.1 MG/DL (ref 8.8–10.2)
CHLORIDE SERPL-SCNC: 95 MMOL/L (ref 98–107)
CO2 SERPL-SCNC: 28 MMOL/L (ref 20–29)
CREAT SERPL-MCNC: 0.96 MG/DL (ref 0.8–1.3)
DIFFERENTIAL METHOD BLD: ABNORMAL
EOSINOPHIL # BLD: 0.12 K/UL (ref 0–0.8)
EOSINOPHIL NFR BLD: 0.6 % (ref 0.5–7.8)
ERYTHROCYTE [DISTWIDTH] IN BLOOD BY AUTOMATED COUNT: 12.6 % (ref 11.9–14.6)
GLUCOSE BLD STRIP.AUTO-MCNC: 118 MG/DL (ref 65–100)
GLUCOSE BLD STRIP.AUTO-MCNC: 191 MG/DL (ref 65–100)
GLUCOSE SERPL-MCNC: 109 MG/DL (ref 70–99)
HCT VFR BLD AUTO: 49.2 % (ref 41.1–50.3)
HGB BLD-MCNC: 17 G/DL (ref 13.6–17.2)
IMM GRANULOCYTES # BLD AUTO: 0.96 K/UL (ref 0–0.5)
IMM GRANULOCYTES NFR BLD AUTO: 4.9 % (ref 0–5)
LYMPHOCYTES # BLD: 2.63 K/UL (ref 0.5–4.6)
LYMPHOCYTES NFR BLD: 13.4 % (ref 13–44)
MCH RBC QN AUTO: 32.1 PG (ref 26.1–32.9)
MCHC RBC AUTO-ENTMCNC: 34.6 G/DL (ref 31.4–35)
MCV RBC AUTO: 92.8 FL (ref 82–102)
MONOCYTES # BLD: 1.29 K/UL (ref 0.1–1.3)
MONOCYTES NFR BLD: 6.6 % (ref 4–12)
NEUTS SEG # BLD: 14.53 K/UL (ref 1.7–8.2)
NEUTS SEG NFR BLD: 74 % (ref 43–78)
NRBC # BLD: 0 K/UL (ref 0–0.2)
PLATELET # BLD AUTO: 302 K/UL (ref 150–450)
PMV BLD AUTO: 8.6 FL (ref 9.4–12.3)
POTASSIUM SERPL-SCNC: 4 MMOL/L (ref 3.5–5.1)
RBC # BLD AUTO: 5.3 M/UL (ref 4.23–5.6)
SERVICE CMNT-IMP: ABNORMAL
SERVICE CMNT-IMP: ABNORMAL
SODIUM SERPL-SCNC: 137 MMOL/L (ref 136–145)
WBC # BLD AUTO: 19.6 K/UL (ref 4.3–11.1)

## 2025-04-26 PROCEDURE — 82962 GLUCOSE BLOOD TEST: CPT

## 2025-04-26 PROCEDURE — 2700000000 HC OXYGEN THERAPY PER DAY

## 2025-04-26 PROCEDURE — 85025 COMPLETE CBC W/AUTO DIFF WBC: CPT

## 2025-04-26 PROCEDURE — 36415 COLL VENOUS BLD VENIPUNCTURE: CPT

## 2025-04-26 PROCEDURE — 6360000002 HC RX W HCPCS: Performed by: INTERNAL MEDICINE

## 2025-04-26 PROCEDURE — 94640 AIRWAY INHALATION TREATMENT: CPT

## 2025-04-26 PROCEDURE — 6360000002 HC RX W HCPCS: Performed by: FAMILY MEDICINE

## 2025-04-26 PROCEDURE — 99233 SBSQ HOSP IP/OBS HIGH 50: CPT | Performed by: INTERNAL MEDICINE

## 2025-04-26 PROCEDURE — 94761 N-INVAS EAR/PLS OXIMETRY MLT: CPT

## 2025-04-26 PROCEDURE — 80048 BASIC METABOLIC PNL TOTAL CA: CPT

## 2025-04-26 PROCEDURE — 2580000003 HC RX 258: Performed by: STUDENT IN AN ORGANIZED HEALTH CARE EDUCATION/TRAINING PROGRAM

## 2025-04-26 PROCEDURE — 6370000000 HC RX 637 (ALT 250 FOR IP): Performed by: INTERNAL MEDICINE

## 2025-04-26 PROCEDURE — 2500000003 HC RX 250 WO HCPCS: Performed by: INTERNAL MEDICINE

## 2025-04-26 RX ORDER — TRIAMCINOLONE ACETONIDE 1 MG/G
OINTMENT TOPICAL 2 TIMES DAILY
Qty: 80 G | Refills: 0 | Status: SHIPPED | OUTPATIENT
Start: 2025-04-26

## 2025-04-26 RX ADMIN — ALBUTEROL SULFATE 2.5 MG: 2.5 SOLUTION RESPIRATORY (INHALATION) at 08:09

## 2025-04-26 RX ADMIN — PREDNISONE 40 MG: 20 TABLET ORAL at 08:55

## 2025-04-26 RX ADMIN — ASPIRIN 81 MG: 81 TABLET ORAL at 08:55

## 2025-04-26 RX ADMIN — Medication 4 ML: at 08:09

## 2025-04-26 RX ADMIN — ATORVASTATIN CALCIUM 20 MG: 20 TABLET, FILM COATED ORAL at 08:55

## 2025-04-26 RX ADMIN — SODIUM CHLORIDE, PRESERVATIVE FREE 10 ML: 5 INJECTION INTRAVENOUS at 08:55

## 2025-04-26 RX ADMIN — ARFORMOTEROL TARTRATE 15 MCG: 15 SOLUTION RESPIRATORY (INHALATION) at 08:08

## 2025-04-26 RX ADMIN — FUROSEMIDE 40 MG: 10 INJECTION, SOLUTION INTRAMUSCULAR; INTRAVENOUS at 08:55

## 2025-04-26 RX ADMIN — BUDESONIDE INHALATION 500 MCG: 0.5 SUSPENSION RESPIRATORY (INHALATION) at 08:09

## 2025-04-26 ASSESSMENT — PAIN SCALES - GENERAL: PAINLEVEL_OUTOF10: 0

## 2025-04-26 NOTE — PROGRESS NOTES
Robson Cox  Admission Date: 4/16/2025         Daily Progress Note: 4/26/2025    The patient's chart is reviewed and the patient is discussed with the staff.    Background: Pt is a 65 yo male with history of tobacco abuse, COPD, pulmonary nodule, allergic rhinitis, and HTN who was seen by Dr. Wesley in the office on 4/16 with complains of worsening dyspnea, thoracic pain, and being confined to the bed since 4/11 after rib contusion. He was hypoxic to 67% on RA and required 6L to improve to 90%. Pt had a CXR which revealed reticulonodular and patchy infiltrates bilaterally. Pt was directly admitted to [facility] for treatment of acute respiratory failure and concern for PNA. Hospitalist was consulted to assume care of pt. Pt was placed on airvo. RVP negative. Pt had chest CTA which was negative for PE but revealed severe B centrilobular emphysema with RLL and LLL atelectasis with LLL consolidation. Sputum cx with light normal resp merna.  Repeat CRP/ESR/BNP and PCT all low. TTE concerning for possible intracardiac shunt across the atrial septum with R-->L shunt but Dr. Chappell reviewed echo and did not feel like it was suggestive of a shunt.   Subjective:     Pt is on 4L O2 sat 90%.Walked with O2 yesterday and still needed 10L with exertion. Given pendant oximizer and wearing at 4L now.     Current Facility-Administered Medications   Medication Dose Route Frequency    glucose chewable tablet 16 g  4 tablet Oral PRN    dextrose bolus 10% 125 mL  125 mL IntraVENous PRN    Or    dextrose bolus 10% 250 mL  250 mL IntraVENous PRN    Glucagon Emergency KIT 1 mg  1 mg SubCUTAneous PRN    dextrose 10 % infusion   IntraVENous Continuous PRN    insulin lispro (HUMALOG,ADMELOG) injection vial 0-4 Units  0-4 Units SubCUTAneous 4x Daily AC & HS    albuterol (PROVENTIL) (2.5 MG/3ML) 0.083% nebulizer solution 2.5 mg  2.5 mg Nebulization TID RT    predniSONE (DELTASONE) tablet 40 mg  40 mg Oral Daily    sodium chloride

## 2025-04-26 NOTE — PROGRESS NOTES
04/26/25 1024   Resting (On O2)   SpO2 91   HR 82   O2 Device Other (comment)  (Oxymizer)   O2 Flow Rate (l/min) 4 l/min   During Walk (On O2)   SpO2 86      O2 Device Other (comment)  (Oxymizer)   O2 Flow Rate (l/min) 6 l/min   Need Additional O2 Flow Rate Rows Yes   O2 Flow Rate (l/min) 8 l/min   O2 Saturation 89   Walk/Assistance Device Ambulation   Rate of Dyspnea 1   Comments Patient ambulated from the bed to the chair and back.   After Walk   SpO2 88   HR 97   O2 Device Other (comment)  (Oxymizer)   O2 Flow Rate (l/min) 6 l/min   Rate of Dyspnea 1   Does the Patient Qualify for Home O2 Yes   Liter Flow at Rest 4   Liter Flow on Exertion 8   Does the Patient Need Portable Oxygen Tanks Yes

## 2025-04-26 NOTE — CARE COORDINATION
Pt is for discharge home today with family  Referral to [facility] for new home O2 set up.  Pt remains on 8 liters of O2.  No additional needs/supportive care orders recieved for LMSW at this time.     04/26/25 8474   Service Assessment   Patient Orientation Alert and Oriented   Cognition Alert   History Provided By Patient;Medical Record   Primary Caregiver Self   Support Systems  --    Patient's Healthcare Decision Maker is:  --    Last Visit to PCP  --    Prior Functional Level  --    Current Functional Level  --    Can patient return to prior living arrangement  --    Ability to make needs known:  --    Family able to assist with home care needs:  --    Would you like for me to discuss the discharge plan with any other family members/significant others, and if so, who?   (spouse)   Social/Functional History   Lives With Spouse   Type of Home Mobile home   Home Layout One level   Home Access Stairs to enter with rails   Entrance Stairs - Number of Steps 2   Entrance Stairs - Rails Right   Bathroom Shower/Tub Walk-in shower   Bathroom Toilet Standard   Bathroom Equipment None   Bathroom Accessibility Accessible   Home Equipment None   Receives Help From Family   Prior Level of Assist for ADLs Independent   Prior Level of Assist for Homemaking Independent   Homemaking Responsibilities Yes   Prior Level of Assist for Transfers Independent   Active  Yes   Mode of Transportation Car;Truck;SUV   Occupation Full time employment   Type of Occupation Supervisor   Services At/After Discharge   Transition of Care Consult (CM Consult) Discharge Planning;DME/Supply Assistance   Services At/After Discharge DME  (new [facility] Home O2 set up)    Resource Information Provided? No   Mode of Transport at Discharge Other (see comment)  (family)   Confirm Follow Up Transport Family   Condition of Participation: Discharge Planning   The Plan for Transition of Care is related to the following treatment goals:  Pt will return home with family and new home O2.   The Patient and/or Patient Representative was provided with a Choice of Provider? Patient   The Patient and/Or Patient Representative agree with the Discharge Plan? Yes   Freedom of Choice list was provided with basic dialogue that supports the patient's individualized plan of care/goals, treatment preferences, and shares the quality data associated with the providers?  Yes

## 2025-04-26 NOTE — PROGRESS NOTES
Patient awaiting for discharge.  Attempting to coordinate oxygen delivery with discharge r/t patient requires 8L with ambulation.  Will continue to monitor.

## 2025-04-26 NOTE — PROGRESS NOTES
Patient given discharge instruction with time given for questions and answers.  Patient's IV out with no difficulty.  Informed patient all Rx at [facility] and ready for .  Patient taken out with oxygen provided by [facility] via w/c to awaiting wife.

## 2025-04-26 NOTE — PROGRESS NOTES
Patient resting in bed with no complaints at this time.  Patient is alert and orientated with no distress noted.  IV intact and patent with no s/s of infection noted.  Respirations even and unlabored with heart rate regular.  Patient continues on NC with 10L needed with ambulation.  Patient able to ambulate independently without assistance.  Bed in low locked position with call light within reach.  Patient instructed to call if assistance is needed.  Will continue to monitor.

## 2025-04-26 NOTE — DISCHARGE INSTRUCTIONS
DISCHARGE SUMMARY from Nurse    PATIENT INSTRUCTIONS:    What to do at Home:  Recommended activity: activity as tolerated.    If you experience any of the following symptoms shortness of breath unrelieved by rest, oxygen % less than 87%, please follow up with PCP.    *  Please give a list of your current medications to your Primary Care Provider.    *  Please update this list whenever your medications are discontinued, doses are      changed, or new medications (including over-the-counter products) are added.    *  Please carry medication information at all times in case of emergency situations.    These are general instructions for a healthy lifestyle:    No smoking/ No tobacco products/ Avoid exposure to second hand smoke  Surgeon General's Warning:  Quitting smoking now greatly reduces serious risk to your health.    Obesity, smoking, and sedentary lifestyle greatly increases your risk for illness    A healthy diet, regular physical exercise & weight monitoring are important for maintaining a healthy lifestyle    You may be retaining fluid if you have a history of heart failure or if you experience any of the following symptoms:  Weight gain of 3 pounds or more overnight or 5 pounds in a week, increased swelling in our hands or feet or shortness of breath while lying flat in bed.  Please call your doctor as soon as you notice any of these symptoms; do not wait until your next office visit.        The discharge information has been reviewed with the patient.  The patient verbalized understanding.  Discharge medications reviewed with the patient and appropriate educational materials and side effects teaching were provided.  ___________________________________________________________________________________________________________________________________

## 2025-04-26 NOTE — DISCHARGE SUMMARY
Hospitalist Discharge Summary   Admit Date:  2025  3:14 PM   DC Note date: 2025  Name:  Robson Cox   Age:  64 y.o.  Sex:  male  :  1960   MRN:  [id number]   Room:  [facility]  PCP:  Howard Saenz MD    Presenting Complaint: No chief complaint on file.     Initial Admission Diagnosis: Acute hypoxic respiratory failure (HCC) [J96.01]  Acute hypoxemic respiratory failure (HCC) [J96.01]     Problem List for this Hospitalization (present on admission):    Principal Problem:    Pneumonia of both lower lobes due to infectious organism  Active Problems:    Pulmonary emphysema (HCC)    Acute hypoxemic respiratory failure (HCC)    Chronic obstructive pulmonary disease with acute lower respiratory infection (HCC)    Interatrial cardiac shunt  Resolved Problems:    * No resolved hospital problems. *      Hospital Course:  This is a continuation of my discharge summary dictated yesterday.  Discharge yesterday was held due to high supplemental O2 requirement, prohibitive to safe discharge.  He did well overnight and was maintained on 4 L O2 BNC.  Oxygen delivery device was changed to pendant oxymizer this a.m. and repeat ambulatory SpO2 showed SpO2 89% on 8 L O2 BNC.  Pulmonologist okay with SpO2 greater than 87%.  Patient has been stabilized sufficiently to discharge home.   will arrange for home 10 L oxygen concentrator and the associated equipment.      Disposition: Home  Diet: ADULT DIET; Regular; 5 carb choices (75 gm/meal)  Code Status: Full Code    Follow Ups:  Follow-up Information       Follow up With Specialties Details Why Contact Info    [facility] CARDIOLOGY Cardiology Follow up Referral ordered.  Office will contact you to arrange appointment. [address] [place] [id number] [phone]    [facility] Pulmonology Schedule an appointment as soon as possible for a visit 2-3 weeks the office  will call you with an appt 3

## 2025-04-28 ENCOUNTER — CARE COORDINATION (OUTPATIENT)
Dept: CARE COORDINATION | Facility: CLINIC | Age: 65
End: 2025-04-28

## 2025-04-28 NOTE — CARE COORDINATION
Care Transitions Note    Initial Call - Call within 2 business days of discharge: Yes    Patient Current Location:  Home: [address] [place] [address]  [place] [address]    Care Transition Nurse contacted the patient by telephone to perform post hospital discharge assessment, verified name and  as identifiers.  Provided introduction to self, and explanation of the Care Transition Nurse role.    Patient: Robson Cox Patient : 1960   MRN: [id number]    Reason for Admission: Shortness of breath   Discharge Date: 25  RURS: Readmission Risk Score: 7      Last Discharge Facility       Date Complaint Diagnosis Description Type Department Provider    25  Shortness of breath ... Admission (Discharged) [id number] Klaus Kam MD            Was this an external facility discharge? No    Additional needs identified to be addressed with provider   No needs identified             Method of communication with provider: none.    Patients top risk factors for readmission: medical condition-Pneumonia, COPD, Acute Respiratory failure, Interatrial cardiac shunt    Interventions to address risk factors:   Patient reports he is doing well since discharged. CTN reviewed discharge instructions and offered opportunity to ask any questions regarding discharge instructions. CTN confirmed medications obtained and taking as ordered. CTN encouraged self-monitoring and when to seek care. Patient reports he is monitoring his oxygen saturation frequently and he knows when to seek care. CTN scheduled PCP follow up. Patient is aware of upcoming appointments.   Care Transition Nurse reviewed discharge instructions, medical action plan, and red flags with patient. The patient was given an opportunity to ask questions; all questions answered at this time.. The patient verbalized understanding.   Were discharge instructions available to patient? Yes.   Reviewed appropriate site of care based on symptoms and

## 2025-05-01 ENCOUNTER — OFFICE VISIT (OUTPATIENT)
Dept: FAMILY MEDICINE CLINIC | Facility: CLINIC | Age: 65
End: 2025-05-01

## 2025-05-01 VITALS
DIASTOLIC BLOOD PRESSURE: 76 MMHG | WEIGHT: 183.8 LBS | BODY MASS INDEX: 27.22 KG/M2 | HEIGHT: 69 IN | HEART RATE: 97 BPM | SYSTOLIC BLOOD PRESSURE: 112 MMHG | OXYGEN SATURATION: 92 %

## 2025-05-01 DIAGNOSIS — J44.89 COPD WITH CHRONIC BRONCHITIS AND EMPHYSEMA (HCC): Primary | ICD-10-CM

## 2025-05-01 DIAGNOSIS — J43.9 COPD WITH CHRONIC BRONCHITIS AND EMPHYSEMA (HCC): Primary | ICD-10-CM

## 2025-05-01 DIAGNOSIS — E83.110 HEREDITARY HEMOCHROMATOSIS: Chronic | ICD-10-CM

## 2025-05-01 DIAGNOSIS — J84.10 PULMONARY FIBROSIS (HCC): ICD-10-CM

## 2025-05-01 DIAGNOSIS — F17.210 SMOKING GREATER THAN 30 PACK YEARS: ICD-10-CM

## 2025-05-01 SDOH — ECONOMIC STABILITY: FOOD INSECURITY: WITHIN THE PAST 12 MONTHS, YOU WORRIED THAT YOUR FOOD WOULD RUN OUT BEFORE YOU GOT MONEY TO BUY MORE.: NEVER TRUE

## 2025-05-01 SDOH — ECONOMIC STABILITY: FOOD INSECURITY: WITHIN THE PAST 12 MONTHS, THE FOOD YOU BOUGHT JUST DIDN'T LAST AND YOU DIDN'T HAVE MONEY TO GET MORE.: NEVER TRUE

## 2025-05-01 ASSESSMENT — PATIENT HEALTH QUESTIONNAIRE - PHQ9
SUM OF ALL RESPONSES TO PHQ QUESTIONS 1-9: 0
SUM OF ALL RESPONSES TO PHQ QUESTIONS 1-9: 0
1. LITTLE INTEREST OR PLEASURE IN DOING THINGS: NOT AT ALL
2. FEELING DOWN, DEPRESSED OR HOPELESS: NOT AT ALL
SUM OF ALL RESPONSES TO PHQ QUESTIONS 1-9: 0
SUM OF ALL RESPONSES TO PHQ QUESTIONS 1-9: 0

## 2025-05-01 NOTE — PROGRESS NOTES
>60 ml/min/1.73m2    Calcium 9.3 8.8 - 10.2 MG/DL   CBC with Auto Differential    Collection Time: 04/21/25  4:56 AM   Result Value Ref Range    WBC 14.9 (H) 4.3 - 11.1 K/uL    RBC 5.23 4.23 - 5.6 M/uL    Hemoglobin 16.7 13.6 - 17.2 g/dL    Hematocrit 50.0 41.1 - 50.3 %    MCV 95.6 82 - 102 FL    MCH 31.9 26.1 - 32.9 PG    MCHC 33.4 31.4 - 35.0 g/dL    RDW 13.0 11.9 - 14.6 %    Platelets 277 150 - 450 K/uL    MPV 9.0 (L) 9.4 - 12.3 FL    nRBC 0.00 0.0 - 0.2 K/uL    Differential Type AUTOMATED      Neutrophils % 90.5 (H) 43.0 - 78.0 %    Lymphocytes % 3.6 (L) 13.0 - 44.0 %    Monocytes % 4.2 4.0 - 12.0 %    Eosinophils % 0.0 (L) 0.5 - 7.8 %    Basophils % 0.2 0.0 - 2.0 %    Immature Granulocytes % 1.5 0.0 - 5.0 %    Neutrophils Absolute 13.46 (H) 1.70 - 8.20 K/UL    Lymphocytes Absolute 0.53 0.50 - 4.60 K/UL    Monocytes Absolute 0.62 0.10 - 1.30 K/UL    Eosinophils Absolute 0.00 0.00 - 0.80 K/UL    Basophils Absolute 0.03 0.00 - 0.20 K/UL    Immature Granulocytes Absolute 0.22 0.0 - 0.5 K/UL   Basic Metabolic Panel w/ Reflex to MG    Collection Time: 04/21/25  4:56 AM   Result Value Ref Range    Sodium 139 136 - 145 mmol/L    Potassium 4.3 3.5 - 5.1 mmol/L    Chloride 98 98 - 107 mmol/L    CO2 29 20 - 29 mmol/L    Anion Gap 11 7 - 16 mmol/L    Glucose 160 (H) 70 - 99 mg/dL    BUN 31 (H) 8 - 23 MG/DL    Creatinine 0.99 0.80 - 1.30 MG/DL    Est, Glom Filt Rate 86 >60 ml/min/1.73m2    Calcium 9.4 8.8 - 10.2 MG/DL   CBC with Auto Differential    Collection Time: 04/22/25  4:04 AM   Result Value Ref Range    WBC 15.9 (H) 4.3 - 11.1 K/uL    RBC 5.31 4.23 - 5.6 M/uL    Hemoglobin 17.1 13.6 - 17.2 g/dL    Hematocrit 51.2 (H) 41.1 - 50.3 %    MCV 96.4 82 - 102 FL    MCH 32.2 26.1 - 32.9 PG    MCHC 33.4 31.4 - 35.0 g/dL    RDW 12.8 11.9 - 14.6 %    Platelets 286 150 - 450 K/uL    MPV 9.0 (L) 9.4 - 12.3 FL    nRBC 0.00 0.0 - 0.2 K/uL    Differential Type AUTOMATED      Neutrophils % 86.4 (H) 43.0 - 78.0 %    Lymphocytes % 5.3

## 2025-05-06 ENCOUNTER — CARE COORDINATION (OUTPATIENT)
Dept: CARE COORDINATION | Facility: CLINIC | Age: 65
End: 2025-05-06

## 2025-05-06 NOTE — CARE COORDINATION
Care Transitions Note    Follow Up Call     Patient Current Location:  Home: [address] [place] [address]  [place] [address]    Care Transition Nurse contacted the patient by telephone. Verified name and  as identifiers.    Additional needs identified to be addressed with provider   No needs identified                 Method of communication with provider: none.  Patients top risk factors for readmission: medical condition-Pneumonia, COPD, Acute Respiratory failure, Interatrial cardiac shunt     Plan of care updates since last contact:  Patient reports he is doing better. Patient reports he completed follow up with her PCP as scheduled. Patient reports he is eating and drinking fluids and denies any new or worsening symptoms. Patient endorses compliance with his medications and supplemental oxygen. Patient reports o2 sats is staying around 88-92%. Patient is aware of upcoming appointments and plans to attend. Patient encouraged to reach out if needs arise.      Care Transition Nurse reviewed medical action plan and red flags with patient. The patient was given an opportunity to ask questions; all questions answered at this time. The patient verbalized understanding.    Medication Review:  Full medication reconciliation completed during previous call. and No changes since last call.     Assessments:  Care Transitions Subsequent and Final Call    Schedule Follow Up Appointment with PCP: Completed  Subsequent and Final Calls  Have your medications changed?: No  Do you have any questions related to your medications?: No  Do you currently have any active services?: No  Do you have any needs or concerns that I can assist you with?: No  Identified Barriers: None  Care Transitions Interventions  Other Interventions:          Follow Up Appointment:   Reviewed upcoming appointment(s).  Future Appointments         Provider Specialty Dept Phone    2025 2:00 PM Jose Del Valle MD Cardiology [phone]    2025

## 2025-05-07 ENCOUNTER — OFFICE VISIT (OUTPATIENT)
Age: 65
End: 2025-05-07
Payer: COMMERCIAL

## 2025-05-07 VITALS
DIASTOLIC BLOOD PRESSURE: 60 MMHG | HEIGHT: 69 IN | WEIGHT: 184.8 LBS | SYSTOLIC BLOOD PRESSURE: 118 MMHG | HEART RATE: 100 BPM | BODY MASS INDEX: 27.37 KG/M2

## 2025-05-07 DIAGNOSIS — Q24.8 INTERATRIAL CARDIAC SHUNT: Primary | ICD-10-CM

## 2025-05-07 DIAGNOSIS — J18.9 PNEUMONIA OF BOTH LOWER LOBES DUE TO INFECTIOUS ORGANISM: ICD-10-CM

## 2025-05-07 PROCEDURE — 99214 OFFICE O/P EST MOD 30 MIN: CPT | Performed by: INTERNAL MEDICINE

## 2025-05-07 ASSESSMENT — ENCOUNTER SYMPTOMS
EYE PAIN: 0
RESPIRATORY NEGATIVE: 1
ALLERGIC/IMMUNOLOGIC NEGATIVE: 1
CHEST TIGHTNESS: 0
SHORTNESS OF BREATH: 0
EYES NEGATIVE: 1
BACK PAIN: 0
PHOTOPHOBIA: 0
GASTROINTESTINAL NEGATIVE: 1
ABDOMINAL PAIN: 0

## 2025-05-07 NOTE — PROGRESS NOTES
[facility] CARDIOLOGY  [address] [place] [address], [address]  [place] [address]  PHONE: [phone]      25    NAME:  Robson Cox  : 1960  MRN: [id number]         SUBJECTIVE:   Robson Cox is a 64 y.o. male seen for follow up of:      Chief Complaint   Patient presents with    Coronary Artery Disease        Cardiac Hx (Reviewed and summarized by me):  1) CAD  CT calcium score - 465 (80%ile for age)  NM stress - normal perfusion, severe SOB could not get target HR  2) FHx - youger brother  at 50 of an AMI  3) COPD  4) Smoker - 1 ppd to 1.5 ppd  5) HLD              22 - HDL 26, , Trig 132              22 - HDL 37, LDL 32.6, Trig 142              23 - HDL 37, LDL 44, Trig 115  5) Echo   25 - LVEF 55-60% with normal LVDF   25 - NTproBNP 111   25 - R to L intra- arterial shunt with bubble study    HPI:  Recently hospitalized with pneumonia.  An echocardiogram was done due to persistent hypoxia.  It showed normal heart function and normal diastolic function.  An NT proBNP was also normal.  Additional studies were done to determine if there was an intra cardiac shunt.  It did show with Valsalva a few bubbles going from the right to left side.  This indicates a patent foramen ovale.  It did not indicate adequate shunting to cause persistent hypoxia.  He remains on oxygen after the pneumonia.    Past Medical History, Past Surgical History, Family history, Social History, and Medications were all reviewed with the patient today and updated as necessary.       Current Outpatient Medications:     triamcinolone (KENALOG) 0.1 % ointment, Apply topically 2 times daily Apply to left forearm rash until it resolves., Disp: 80 g, Rfl: 0    predniSONE (DELTASONE) 20 MG tablet, Take 2 tablets by mouth daily for 3 days, THEN 1 tablet daily for 7 days, THEN 0.5 tablets daily for 8 days., Disp: 17 tablet, Rfl: 0    ipratropium 0.5 mg-albuterol 2.5 mg (DUONEB) 0.5-2.5

## 2025-05-16 ENCOUNTER — OFFICE VISIT (OUTPATIENT)
Dept: PULMONOLOGY | Age: 65
End: 2025-05-16

## 2025-05-16 VITALS
WEIGHT: 191.3 LBS | TEMPERATURE: 97.7 F | OXYGEN SATURATION: 90 % | DIASTOLIC BLOOD PRESSURE: 74 MMHG | SYSTOLIC BLOOD PRESSURE: 121 MMHG | HEART RATE: 88 BPM | RESPIRATION RATE: 18 BRPM | HEIGHT: 69 IN | BODY MASS INDEX: 28.33 KG/M2

## 2025-05-16 DIAGNOSIS — R91.8 PULMONARY NODULES: Chronic | ICD-10-CM

## 2025-05-16 DIAGNOSIS — Q24.8 INTERATRIAL CARDIAC SHUNT: Chronic | ICD-10-CM

## 2025-05-16 DIAGNOSIS — F17.219 CIGARETTE NICOTINE DEPENDENCE WITH NICOTINE-INDUCED DISORDER: Chronic | ICD-10-CM

## 2025-05-16 DIAGNOSIS — J18.9 PNEUMONIA OF BOTH LOWER LOBES DUE TO INFECTIOUS ORGANISM: ICD-10-CM

## 2025-05-16 DIAGNOSIS — J44.9 COPD, VERY SEVERE (HCC): Primary | Chronic | ICD-10-CM

## 2025-05-16 DIAGNOSIS — J44.9 COPD, VERY SEVERE (HCC): Primary | ICD-10-CM

## 2025-05-16 DIAGNOSIS — J96.11 CHRONIC RESPIRATORY FAILURE WITH HYPOXIA (HCC): Chronic | ICD-10-CM

## 2025-05-16 DIAGNOSIS — J43.2 CENTRILOBULAR EMPHYSEMA (HCC): Chronic | ICD-10-CM

## 2025-05-16 DIAGNOSIS — Z09 HOSPITAL DISCHARGE FOLLOW-UP: Chronic | ICD-10-CM

## 2025-05-16 RX ORDER — FLUTICASONE FUROATE, UMECLIDINIUM BROMIDE AND VILANTEROL TRIFENATATE 100; 62.5; 25 UG/1; UG/1; UG/1
POWDER RESPIRATORY (INHALATION)
Qty: 3 EACH | Refills: 3 | Status: SHIPPED | OUTPATIENT
Start: 2025-05-16

## 2025-05-16 RX ORDER — ALBUTEROL SULFATE 0.83 MG/ML
SOLUTION RESPIRATORY (INHALATION)
Qty: 360 ML | Refills: 22 | Status: SHIPPED | OUTPATIENT
Start: 2025-05-16

## 2025-05-16 ASSESSMENT — ENCOUNTER SYMPTOMS
WHEEZING: 0
COUGH: 0
SPUTUM PRODUCTION: 0
HEMOPTYSIS: 0
SHORTNESS OF BREATH: 1

## 2025-05-16 NOTE — PROGRESS NOTES
[facility]  [address]  [place] [address]  [phone]    Patient Name:  Robson Cox    YOB: 1960    Office Visit 5/16/2025      CHIEF COMPLAINT:      Chief Complaint   Patient presents with    Follow-Up from Hospital     Hospital Follow up appt    Pneumonia         ASSESSMENT:   (Medical Decision Making)                                                                                                                                          Encounter Diagnoses   Name Primary?    COPD, very severe (HCC) Yes    Hospital discharge follow-up     Pneumonia of both lower lobes due to infectious organism     Centrilobular emphysema (HCC)     Interatrial cardiac shunt     Comment: PFO on echo, per review with cardiology was felt to be minimal, nonphysiologic and not benefit from closure.     Chronic respiratory failure with hypoxia (HCC)     Pulmonary nodules     Cigarette nicotine dependence with nicotine-induced disorder      Interval improvement symptomatically.  He is compliant with Trelegy inhaler.  Was given DuoNeb for nebulizer, duplicates anticholinergic in Trelegy, will change to albuterol alone.  Has albuterol inhaler and reviewed technique of MDIs.    PFO on echo during hospitalization, per cardiology not felt to be significant enough to warrant closure.    Appears to have interval improvement in oxygenation, he was able to maintain adequate saturation on 1 to 2 L/min at rest.  Advised to titrate flow rate to maintain saturation above 88 with exertion.    Last CT was Negative for PE, notable for LLL consolidation, no suspicious pulmonary nodules.    He remains tobacco free.    He qualifies for handicap decal, form is completed and provided to him.  Also provided additional letter for work and if he needs updated short-term disability form, advised to forward to us and I will complete it.                      PLAN:     Continue Trelegy 1

## 2025-05-16 NOTE — PATIENT INSTRUCTIONS
Continue Trelegy 1 inhalation every morning, gargle with water after use.    May continue to use nebulizer 4 times daily if needed, use albuterol inhaler when away from home/nebulizer.    Continue oxygen at 1 to 2 L/min at rest, titrate flow rate to maintain saturation greater than 88% with exertion.  Discussed use of pendant Oxymizer that was provided to him upon hospital discharge.  He can contact DME to determine if there is alternate Oxymizer tubing available that may be more comfortable for him.    Alpha-1 antitrypsin level was obtained.    Handicap decal form completed.  Work letter provided.    Follow-up in approximately 6 weeks with Dr. Santoro with spirometry and CXR.

## 2025-05-20 ENCOUNTER — CARE COORDINATION (OUTPATIENT)
Dept: CARE COORDINATION | Facility: CLINIC | Age: 65
End: 2025-05-20

## 2025-05-20 NOTE — CARE COORDINATION
Care Transitions Note    Follow Up Call     Patient Current Location:  Home: [address] [place] [address]  [place] [address]    Care Transition Nurse contacted the patient by telephone. Verified name and  as identifiers.    Additional needs identified to be addressed with provider   No needs identified                 Method of communication with provider: none.  Patients top risk factors for readmission: medical condition-Pneumonia, COPD, Acute Respiratory failure, Interatrial cardiac shunt     Plan of care updates since last contact:  Patient reports he is doing better. Patient reports he completed follow up with Pulmonary and Cardiologist as scheduled. Patient reports he continues to monitor himself at home, and he denies any new or worsening symptoms. Patient endorses compliance with his medications and supplemental oxygen. Patient reports o2 sats is staying around 88-92%. Patient reports he continues to use Incentive spirometer frequently. Patient is aware of upcoming appointments and plans to attend. Patient encouraged to reach out if needs arise.      Care Transition Nurse reviewed medical action plan and red flags with patient. The patient was given an opportunity to ask questions; all questions answered at this time. The patient verbalized understanding.  Medication Review:  Full medication reconciliation completed during previous call.    Assessments:  Care Transitions Subsequent and Final Call    Subsequent and Final Calls  Care Transitions Interventions  Other Interventions:          Follow Up Appointment:   Reviewed upcoming appointment(s).  Future Appointments         Provider Specialty Dept Phone    2025 2:40 PM (Arrive by 2:25 PM) Klaus Santoro Jr, MD Pulmonology [phone]    2025 8:15 AM Jose Del Valle MD Cardiology [phone]        Care Transition Nurse provided contact information.   Plan for follow up  based on severity of symptoms and risk factors.  Plan for next call:

## 2025-05-29 ENCOUNTER — CARE COORDINATION (OUTPATIENT)
Dept: CARE COORDINATION | Facility: CLINIC | Age: 65
End: 2025-05-29

## 2025-05-29 NOTE — CARE COORDINATION
Care Transitions Note    Final Call     Patient Current Location:  Home: [address] [place] [address]  [place] [address]    Care Transition Nurse contacted the patient by telephone. Verified name and  as identifiers.    Patient graduated from the Care Transitions program on 25.  Patient/family verbalizes confidence in the ability to self-manage at this time. has the ability to self-manage at this time.    Handoff:   Patient was not referred to the ACM team due to no additional needs identified.     Patient reports he is doing well and no needs or concerns voiced at this time. Patient has contact for providers.     Assessments:  Care Transitions Subsequent and Final Call    Subsequent and Final Calls  Care Transitions Interventions  Other Interventions:          Upcoming Appointments:    Future Appointments         Provider Specialty Dept Phone    2025 2:40 PM (Arrive by 2:25 PM) Klaus Santoro Jr, MD Pulmonology [phone]    2025 8:15 AM Jose Del Valle MD Cardiology [phone]        Patient has agreed to contact primary care provider and/or specialist for any further questions, concerns, or needs.    Mirtha Haynes RN

## 2025-06-05 ENCOUNTER — COMMUNITY OUTREACH (OUTPATIENT)
Dept: FAMILY MEDICINE CLINIC | Facility: CLINIC | Age: 65
End: 2025-06-05

## 2025-06-06 ENCOUNTER — RESULTS FOLLOW-UP (OUTPATIENT)
Dept: PULMONOLOGY | Age: 65
End: 2025-06-06

## 2025-06-11 ENCOUNTER — TELEPHONE (OUTPATIENT)
Dept: PULMONOLOGY | Age: 65
End: 2025-06-11

## 2025-06-11 NOTE — TELEPHONE ENCOUNTER
Patient needs paperwork completed for short term disability . He is going to drop it off here within the next couple hours . Needs it filled out by last provider seen that put him out of work for 6 more wks . He ask also if  it could be faxed to the company once filled out

## 2025-06-11 NOTE — TELEPHONE ENCOUNTER
He dropped off disability paperwork at the front office today.  I have completed paperwork to the best of my ability.  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    I attempted to call him and his wife, no answer.    Forwarded to Karrie Stein MA, and will determine if he wants this faxed or if he is planning on picking it up.    In the future, he will need an appt for any disability related forms.

## 2025-06-12 NOTE — TELEPHONE ENCOUNTER
STD Paperwork has been faxed to [employer] Dept for [employer] @ [phone]. Original form has been mailed to the patient.

## 2025-07-03 DIAGNOSIS — R91.8 PULMONARY NODULES: ICD-10-CM

## 2025-07-03 DIAGNOSIS — R06.02 SOB (SHORTNESS OF BREATH): ICD-10-CM

## 2025-07-03 DIAGNOSIS — J44.9 COPD, VERY SEVERE (HCC): Primary | ICD-10-CM

## 2025-07-09 ENCOUNTER — HOSPITAL ENCOUNTER (OUTPATIENT)
Dept: GENERAL RADIOLOGY | Age: 65
Discharge: HOME | End: 2025-07-11
Payer: COMMERCIAL

## 2025-07-09 ENCOUNTER — OFFICE VISIT (OUTPATIENT)
Dept: PULMONOLOGY | Age: 65
End: 2025-07-09
Payer: COMMERCIAL

## 2025-07-09 VITALS
RESPIRATION RATE: 16 BRPM | HEART RATE: 75 BPM | DIASTOLIC BLOOD PRESSURE: 64 MMHG | TEMPERATURE: 97.7 F | BODY MASS INDEX: 28.41 KG/M2 | WEIGHT: 192.4 LBS | SYSTOLIC BLOOD PRESSURE: 138 MMHG | OXYGEN SATURATION: 89 %

## 2025-07-09 DIAGNOSIS — R91.8 PULMONARY NODULES: ICD-10-CM

## 2025-07-09 DIAGNOSIS — J44.9 COPD, VERY SEVERE (HCC): Primary | ICD-10-CM

## 2025-07-09 DIAGNOSIS — J96.11 CHRONIC RESPIRATORY FAILURE WITH HYPOXIA (HCC): ICD-10-CM

## 2025-07-09 DIAGNOSIS — R06.02 SOB (SHORTNESS OF BREATH): ICD-10-CM

## 2025-07-09 DIAGNOSIS — R91.8 PULMONARY INFILTRATES: ICD-10-CM

## 2025-07-09 DIAGNOSIS — J44.9 COPD, VERY SEVERE (HCC): ICD-10-CM

## 2025-07-09 DIAGNOSIS — Q24.8 INTERATRIAL CARDIAC SHUNT: ICD-10-CM

## 2025-07-09 PROCEDURE — 71046 X-RAY EXAM CHEST 2 VIEWS: CPT

## 2025-07-09 PROCEDURE — 99214 OFFICE O/P EST MOD 30 MIN: CPT | Performed by: INTERNAL MEDICINE

## 2025-07-09 NOTE — PROGRESS NOTES
[facility]: FOLLOW-UP Patient Office Visit Note  [address], [address]  [place] [address] [phone]    Patient Name:  Robson Cox  YOB: 1960            Date of Service:  7/9/2025    Chief Complaint   Patient presents with    COPD       History of Present Illness:  This is a 64-year-old male with a history of severe COPD, chronic respiratory failure, pulmonary nodules, allergic rhinitis last seen in the office in May after he had been hospitalized in April with pneumonia..  Patient has been maintained on oxygen 4 L at night and 1-2 during the day.  He denies cough but does admit to shortness of breath.  He quit smoking 2 years ago.  He is compliant with Trelegy and uses albuterol 4 times a day.  Patient states O2 sats dropping to the mid 80s when he is off oxygen.    Past Medical History:   Diagnosis Date    Allergic rhinitis     Chronic obstructive pulmonary disease (HCC)     inhaler prn, smoker    Cough due to bronchospasm 2/11/2020    chest discomfort (ribs) with cough    Elevated blood pressure reading 2/13/2022    Frequent BP checks and report 2 weeks     Overweight (BMI 25.0-29.9) 11/27/2018    Low carb diet advised/reviewed. Information given.     Pneumonia     twice.  Last episode was 2016       Patient Active Problem List   Diagnosis    Colonoscopy refused    Chronic bronchitis (HCC)    Smoking greater than 30 pack years    Hereditary hemochromatosis    Distorted taste    Dupuytren's contracture of right hand    COVID-19 vaccine series completed    Refused influenza vaccine    Tobacco abuse    Pulmonary emphysema (HCC)    History of pneumonia    Family history of premature CAD    Dyslipidemia    Abnormal cardiovascular function study    Coronary artery disease due to calcified coronary lesion    High risk medication use    Chronic cluster headache, not intractable    Elevated hemoglobin    Elevated hematocrit    Mixed hyperlipidemia    Acute

## 2025-07-09 NOTE — PATIENT INSTRUCTIONS
If you do not hear from Radiology Scheduling within 1 week please call the number below:    [facility]  P: [phone]

## 2025-07-18 ENCOUNTER — TELEPHONE (OUTPATIENT)
Dept: PULMONOLOGY | Age: 65
End: 2025-07-18

## 2025-08-22 ENCOUNTER — HOSPITAL ENCOUNTER (OUTPATIENT)
Dept: CT IMAGING | Age: 65
Discharge: HOME | End: 2025-08-24
Attending: INTERNAL MEDICINE
Payer: COMMERCIAL

## 2025-08-22 DIAGNOSIS — R91.8 PULMONARY INFILTRATES: ICD-10-CM

## 2025-08-22 DIAGNOSIS — J96.11 CHRONIC RESPIRATORY FAILURE WITH HYPOXIA (HCC): ICD-10-CM

## 2025-08-22 PROCEDURE — 71250 CT THORAX DX C-: CPT

## 2025-08-25 ENCOUNTER — OFFICE VISIT (OUTPATIENT)
Age: 65
End: 2025-08-25
Payer: COMMERCIAL

## 2025-08-25 VITALS
HEART RATE: 88 BPM | BODY MASS INDEX: 29.83 KG/M2 | WEIGHT: 201.4 LBS | HEIGHT: 69 IN | DIASTOLIC BLOOD PRESSURE: 78 MMHG | SYSTOLIC BLOOD PRESSURE: 112 MMHG

## 2025-08-25 DIAGNOSIS — Q21.10 ASD (ATRIAL SEPTAL DEFECT): ICD-10-CM

## 2025-08-25 DIAGNOSIS — J96.01 ACUTE HYPOXEMIC RESPIRATORY FAILURE (HCC): Primary | ICD-10-CM

## 2025-08-25 PROCEDURE — 99214 OFFICE O/P EST MOD 30 MIN: CPT | Performed by: INTERNAL MEDICINE

## 2025-08-25 RX ORDER — SODIUM CHLORIDE 0.9 % (FLUSH) 0.9 %
5-40 SYRINGE (ML) INJECTION PRN
OUTPATIENT
Start: 2025-08-25

## 2025-08-25 RX ORDER — SODIUM CHLORIDE 0.9 % (FLUSH) 0.9 %
5-40 SYRINGE (ML) INJECTION EVERY 12 HOURS SCHEDULED
OUTPATIENT
Start: 2025-08-25

## 2025-08-25 RX ORDER — SODIUM CHLORIDE 9 MG/ML
INJECTION, SOLUTION INTRAVENOUS PRN
OUTPATIENT
Start: 2025-08-25

## 2025-08-25 ASSESSMENT — ENCOUNTER SYMPTOMS
ABDOMINAL PAIN: 0
BACK PAIN: 0
EYE PAIN: 0
PHOTOPHOBIA: 0
ALLERGIC/IMMUNOLOGIC NEGATIVE: 1
SHORTNESS OF BREATH: 1
EYES NEGATIVE: 1
CHEST TIGHTNESS: 0
GASTROINTESTINAL NEGATIVE: 1

## 2025-08-26 PROBLEM — Q21.10 ASD (ATRIAL SEPTAL DEFECT): Status: ACTIVE | Noted: 2025-08-25

## 2025-08-28 ENCOUNTER — OFFICE VISIT (OUTPATIENT)
Dept: PULMONOLOGY | Age: 65
End: 2025-08-28
Payer: COMMERCIAL

## 2025-08-28 VITALS
OXYGEN SATURATION: 77 % | HEIGHT: 69 IN | BODY MASS INDEX: 29.77 KG/M2 | TEMPERATURE: 98 F | SYSTOLIC BLOOD PRESSURE: 110 MMHG | DIASTOLIC BLOOD PRESSURE: 80 MMHG | RESPIRATION RATE: 20 BRPM | HEART RATE: 81 BPM | WEIGHT: 201 LBS

## 2025-08-28 DIAGNOSIS — J43.9 PULMONARY EMPHYSEMA, UNSPECIFIED EMPHYSEMA TYPE (HCC): ICD-10-CM

## 2025-08-28 DIAGNOSIS — J44.9 COPD, VERY SEVERE (HCC): Chronic | ICD-10-CM

## 2025-08-28 DIAGNOSIS — R91.8 PULMONARY INFILTRATES: ICD-10-CM

## 2025-08-28 DIAGNOSIS — J96.11 CHRONIC RESPIRATORY FAILURE WITH HYPOXIA (HCC): Primary | ICD-10-CM

## 2025-08-28 PROCEDURE — 99214 OFFICE O/P EST MOD 30 MIN: CPT | Performed by: INTERNAL MEDICINE

## 2025-08-28 RX ORDER — FLUTICASONE FUROATE, UMECLIDINIUM BROMIDE AND VILANTEROL TRIFENATATE 100; 62.5; 25 UG/1; UG/1; UG/1
POWDER RESPIRATORY (INHALATION)
Qty: 3 EACH | Refills: 3 | Status: SHIPPED | OUTPATIENT
Start: 2025-08-28

## 2025-08-28 RX ORDER — ALBUTEROL SULFATE 90 UG/1
2 INHALANT RESPIRATORY (INHALATION) EVERY 4 HOURS PRN
Qty: 18 G | Refills: 5 | Status: SHIPPED | OUTPATIENT
Start: 2025-08-28

## 2025-08-28 RX ORDER — ALBUTEROL SULFATE 0.83 MG/ML
SOLUTION RESPIRATORY (INHALATION)
Qty: 360 ML | Refills: 22 | Status: SHIPPED | OUTPATIENT
Start: 2025-08-28

## (undated) DEVICE — SOLUTION IV 1000ML 0.9% SOD CHL

## (undated) DEVICE — STERILE HOOK LOCK LATEX FREE ELASTIC BANDAGE 2INX5YD: Brand: HOOK LOCK™

## (undated) DEVICE — SUTURE NONABSORBABLE MONOFILAMENT 4-0 PS-2 18 IN BLU PROLENE 8682H

## (undated) DEVICE — PADDING CAST W2INXL4YD ST COT COHESIVE HND TEARABLE SPEC

## (undated) DEVICE — BUTTON SWITCH PENCIL BLADE ELECTRODE, HOLSTER: Brand: EDGE

## (undated) DEVICE — DRAPE, FILM SHEET, 44X65 STERILE: Brand: MEDLINE

## (undated) DEVICE — DRAPE,HAND,STERILE: Brand: MEDLINE

## (undated) DEVICE — SUT CHRMC 4-0 27IN SH BRN --

## (undated) DEVICE — APPLICATOR BNDG 1MM ADH PREMIERPRO EXOFIN

## (undated) DEVICE — SURGICAL PROCEDURE PACK BASIC ST FRANCIS

## (undated) DEVICE — BANDAGE COMPR 9 FTX4 IN SMOOTH COMFORTABLE SYNTH ESMRK LF

## (undated) DEVICE — (D)PREP SKN CHLRAPRP APPL 26ML -- CONVERT TO ITEM 371833

## (undated) DEVICE — SPLINT ORTH W3XL12IN RL FRM WRINKLE FREE INTLOK PERFRMANCE